# Patient Record
Sex: FEMALE | Race: WHITE | NOT HISPANIC OR LATINO | Employment: FULL TIME | URBAN - METROPOLITAN AREA
[De-identification: names, ages, dates, MRNs, and addresses within clinical notes are randomized per-mention and may not be internally consistent; named-entity substitution may affect disease eponyms.]

---

## 2017-01-16 RX ORDER — AMLODIPINE BESYLATE 10 MG/1
TABLET ORAL
Qty: 90 TABLET | Refills: 0 | Status: SHIPPED | OUTPATIENT
Start: 2017-01-16 | End: 2017-04-21

## 2017-01-16 RX ORDER — METOPROLOL SUCCINATE 50 MG/1
TABLET, EXTENDED RELEASE ORAL
Qty: 180 TABLET | Refills: 0 | Status: SHIPPED | OUTPATIENT
Start: 2017-01-16 | End: 2017-07-29 | Stop reason: SDUPTHER

## 2017-01-19 DIAGNOSIS — I10 ESSENTIAL HYPERTENSION: ICD-10-CM

## 2017-01-19 RX ORDER — LOSARTAN POTASSIUM AND HYDROCHLOROTHIAZIDE 12.5; 1 MG/1; MG/1
1 TABLET ORAL DAILY
Qty: 90 TABLET | Refills: 1 | Status: SHIPPED | OUTPATIENT
Start: 2017-01-19 | End: 2017-08-02 | Stop reason: SDUPTHER

## 2017-02-13 ENCOUNTER — TELEPHONE (OUTPATIENT)
Dept: FAMILY MEDICINE CLINIC | Facility: CLINIC | Age: 47
End: 2017-02-13

## 2017-02-13 RX ORDER — FENOFIBRIC ACID 135 MG/1
135 CAPSULE, DELAYED RELEASE ORAL DAILY
Qty: 30 CAPSULE | Refills: 6 | Status: SHIPPED | OUTPATIENT
Start: 2017-02-13 | End: 2017-03-23 | Stop reason: SDUPTHER

## 2017-02-13 RX ORDER — ROSUVASTATIN CALCIUM 20 MG/1
20 TABLET, COATED ORAL DAILY
Qty: 30 TABLET | Refills: 6 | Status: SHIPPED | OUTPATIENT
Start: 2017-02-13 | End: 2017-10-31 | Stop reason: SDUPTHER

## 2017-03-23 RX ORDER — FENOFIBRIC ACID 135 MG/1
135 CAPSULE, DELAYED RELEASE ORAL DAILY
Qty: 30 CAPSULE | Refills: 0 | Status: SHIPPED | OUTPATIENT
Start: 2017-03-23 | End: 2017-12-04

## 2017-04-21 ENCOUNTER — OFFICE VISIT (OUTPATIENT)
Dept: FAMILY MEDICINE CLINIC | Facility: CLINIC | Age: 47
End: 2017-04-21

## 2017-04-21 VITALS
OXYGEN SATURATION: 95 % | DIASTOLIC BLOOD PRESSURE: 68 MMHG | SYSTOLIC BLOOD PRESSURE: 102 MMHG | BODY MASS INDEX: 26.78 KG/M2 | WEIGHT: 156 LBS | HEART RATE: 67 BPM | TEMPERATURE: 98.3 F

## 2017-04-21 DIAGNOSIS — Z72.0 TOBACCO ABUSE: ICD-10-CM

## 2017-04-21 DIAGNOSIS — E78.2 MIXED HYPERLIPIDEMIA: ICD-10-CM

## 2017-04-21 DIAGNOSIS — I10 ESSENTIAL HYPERTENSION: Primary | ICD-10-CM

## 2017-04-21 LAB
ALBUMIN SERPL-MCNC: 4.6 G/DL (ref 3.5–5.2)
ALBUMIN/GLOB SERPL: 2.4 G/DL
ALP SERPL-CCNC: 39 U/L (ref 39–117)
ALT SERPL-CCNC: 25 U/L (ref 1–33)
AST SERPL-CCNC: 18 U/L (ref 1–32)
BILIRUB SERPL-MCNC: 0.3 MG/DL (ref 0.1–1.2)
BUN SERPL-MCNC: 9 MG/DL (ref 6–20)
BUN/CREAT SERPL: 8.7 (ref 7–25)
CALCIUM SERPL-MCNC: 9.8 MG/DL (ref 8.6–10.5)
CHLORIDE SERPL-SCNC: 103 MMOL/L (ref 98–107)
CHOLEST SERPL-MCNC: 66 MG/DL (ref 0–200)
CO2 SERPL-SCNC: 26.8 MMOL/L (ref 22–29)
CREAT SERPL-MCNC: 1.04 MG/DL (ref 0.57–1)
GLOBULIN SER CALC-MCNC: 1.9 GM/DL
GLUCOSE SERPL-MCNC: 94 MG/DL (ref 65–99)
HDLC SERPL-MCNC: 31 MG/DL (ref 40–60)
LDLC SERPL CALC-MCNC: 18 MG/DL (ref 0–100)
POTASSIUM SERPL-SCNC: 4.1 MMOL/L (ref 3.5–5.2)
PROT SERPL-MCNC: 6.5 G/DL (ref 6–8.5)
SODIUM SERPL-SCNC: 143 MMOL/L (ref 136–145)
TRIGL SERPL-MCNC: 86 MG/DL (ref 0–150)
VLDLC SERPL CALC-MCNC: 17.2 MG/DL (ref 5–40)

## 2017-04-21 PROCEDURE — 99214 OFFICE O/P EST MOD 30 MIN: CPT | Performed by: FAMILY MEDICINE

## 2017-04-21 RX ORDER — VARENICLINE TARTRATE 1 MG/1
1 TABLET, FILM COATED ORAL 2 TIMES DAILY
Qty: 60 TABLET | Refills: 3 | Status: SHIPPED | OUTPATIENT
Start: 2017-04-21 | End: 2017-12-04

## 2017-04-21 RX ORDER — ASCORBIC ACID 125 MG
300 TABLET,CHEWABLE ORAL DAILY
COMMUNITY
End: 2019-03-19

## 2017-04-21 RX ORDER — TESTOSTERONE 10 MG/.5G
GEL, METERED TOPICAL
COMMUNITY
End: 2017-12-04

## 2017-04-21 RX ORDER — ASCORBIC ACID 500 MG
500 TABLET ORAL DAILY
COMMUNITY
End: 2019-03-19

## 2017-04-21 RX ORDER — PARSLEY 450 MG
CAPSULE ORAL
COMMUNITY
End: 2019-03-19

## 2017-04-21 RX ORDER — PROGESTERONE 50 MG/ML
INJECTION, SOLUTION INTRAMUSCULAR DAILY
COMMUNITY
End: 2018-03-06

## 2017-04-21 NOTE — PROGRESS NOTES
Subjective   Myriam Thomas is a 46 y.o. female. Presents today for   Chief Complaint   Patient presents with   • Follow-up     Has gotten hormonal replacement.  med check    • Hyperlipidemia   • Hypertension       Hypertension   This is a chronic problem. The current episode started more than 1 year ago. The problem is unchanged. The problem is controlled. Pertinent negatives include no chest pain, headaches, orthopnea, palpitations, peripheral edema, PND or shortness of breath. There are no associated agents to hypertension. Risk factors for coronary artery disease include dyslipidemia, post-menopausal state and smoking/tobacco exposure. Past treatments include beta blockers, angiotensin blockers and diuretics. The current treatment provides significant improvement. There are no compliance problems.  There is no history of kidney disease, CAD/MI or CVA. There is no history of chronic renal disease.   Hyperlipidemia   This is a chronic problem. The current episode started more than 1 year ago. Condition status: Due for recheck. Lipid results: Due for recheck. She has no history of chronic renal disease, diabetes, hypothyroidism or liver disease. Factors aggravating her hyperlipidemia include beta blockers and thiazides. Pertinent negatives include no chest pain, focal sensory loss, focal weakness, leg pain, myalgias or shortness of breath. Current antihyperlipidemic treatment includes statins and fibric acid derivatives. The current treatment provides significant improvement of lipids. There are no compliance problems.  Risk factors for coronary artery disease include dyslipidemia, hypertension and post-menopausal.   Nicotine Dependence   Presents for initial visit. Symptoms include cravings. Preferred tobacco types include cigarettes. Her urge triggers include company of smokers. Past treatments include nothing. Myriam is ready to quit. Myriam has tried to quit 0 times.       Review of Systems   Constitutional: Unexpected  weight change: Weight gain or loss.   Respiratory: Negative for shortness of breath and wheezing.    Cardiovascular: Negative for chest pain, palpitations, orthopnea, leg swelling and PND.   Gastrointestinal: Negative for abdominal pain, nausea and vomiting.   Musculoskeletal: Negative for myalgias.   Neurological: Negative for dizziness, tremors, focal weakness, syncope, facial asymmetry, speech difficulty, weakness, light-headedness, numbness and headaches.   Psychiatric/Behavioral: The patient is nervous/anxious.        PHQ-9 Depression Screening 4/21/2017   Little interest or pleasure in doing things 0   Feeling down, depressed, or hopeless 1   Trouble falling or staying asleep, or sleeping too much 1   Feeling tired or having little energy 0   Poor appetite or overeating 1   Feeling bad about yourself - or that you are a failure or have let yourself or your family down 0   Trouble concentrating on things, such as reading the newspaper or watching television 0   Moving or speaking so slowly that other people could have noticed. Or the opposite - being so fidgety or restless that you have been moving around a lot more than usual 0   Thoughts that you would be better off dead, or of hurting yourself in some way 1   PHQ-9 Total Score 4   Reports  from  in Feb.  Doing ok and dealing with this.    The following portions of the patient's history were reviewed and updated as appropriate: allergies, current medications, past medical history, past social history and problem list.    Patient Active Problem List   Diagnosis   • Mitral valve insufficiency   • Palpitations   • Essential hypertension   • Tobacco abuse   • Anxiety   • Mixed hyperlipidemia       Allergies   Allergen Reactions   • Hydrocodone-Acetaminophen        Current Outpatient Prescriptions on File Prior to Visit   Medication Sig Dispense Refill   • aspirin 81 MG tablet Take  by mouth daily.     • escitalopram (LEXAPRO) 20 MG tablet Take 1  tablet by mouth Daily. 90 tablet 3   • fenofibric acid (TRILIPIX) 135 MG capsule delayed-release delayed release capsule Take 1 capsule by mouth Daily. 30 capsule 0   • folic acid (FOLVITE) 400 MCG tablet Take 400 mcg by mouth daily.     • losartan-hydrochlorothiazide (HYZAAR) 100-12.5 MG per tablet Take 1 tablet by mouth Daily. 90 tablet 1   • metoprolol succinate XL (TOPROL-XL) 50 MG 24 hr tablet TAKE 1 TABLET TWICE DAILY 180 tablet 0   • rosuvastatin (CRESTOR) 20 MG tablet Take 1 tablet by mouth Daily. 30 tablet 6   • [DISCONTINUED] amLODIPine (NORVASC) 10 MG tablet TAKE 1 TABLET BY MOUTH EVERY DAY 90 tablet 0     No current facility-administered medications on file prior to visit.        Objective   Vitals:    04/21/17 0806   BP: 102/68   Pulse: 67   Temp: 98.3 °F (36.8 °C)   SpO2: 95%   Weight: 156 lb (70.8 kg)       Physical Exam   Constitutional: She appears well-developed and well-nourished.   HENT:   Head: Normocephalic and atraumatic.   Neck: Neck supple. No JVD present. No thyromegaly present.   Cardiovascular: Normal rate, regular rhythm and normal heart sounds.  Exam reveals no gallop and no friction rub.    No murmur heard.  Pulmonary/Chest: Effort normal and breath sounds normal. No respiratory distress. She has no wheezes. She has no rales.   Abdominal: Soft. Bowel sounds are normal. She exhibits no distension. There is no tenderness. There is no rebound and no guarding.   Musculoskeletal: She exhibits no edema.   Neurological: She is alert.   Skin: Skin is warm and dry.   Psychiatric: She has a normal mood and affect. Her behavior is normal.   Nursing note and vitals reviewed.      Assessment/Plan   Myriam was seen today for follow-up, hyperlipidemia and hypertension.    Diagnoses and all orders for this visit:    Essential hypertension  -     Comprehensive Metabolic Panel    Mixed hyperlipidemia  -     Lipid Panel  -     Comprehensive Metabolic Panel    Tobacco abuse  -     varenicline (CHANTIX  STARTING MONTH [place]) 0.5 MG X 11 & 1 MG X 42 tablet; Take 0.5 mg one daily on days 1-2 and 0.5 mg twice daily on days 4-7. Then 1 mg twice daily for a total of 12 weeks.  -     varenicline (CHANTIX CONTINUING MONTH [place]) 1 MG tablet; Take 1 tablet by mouth 2 (Two) Times a Day.    -hypertension - controlled, continue medications  -HLD - continue statin, recheck lipids.  -I advised the patient of the risks in continuing to use tobacco, and I provided this patient with smoking cessation Rx.  I also discussed how to quit smoking and the patient has expressed the willingness to quit.    -During this visit, I spent 3-10 mintues counseling the patient regarding smoking cessation.            -Follow up: 6 months       Current Outpatient Prescriptions:   •  Ashwagandha 500 MG capsule, Take  by mouth., Disp: , Rfl:   •  aspirin 81 MG tablet, Take  by mouth daily., Disp: , Rfl:   •  Cyanocobalamin (B-12) 5000 MCG capsule, Take  by mouth., Disp: , Rfl:   •  escitalopram (LEXAPRO) 20 MG tablet, Take 1 tablet by mouth Daily., Disp: 90 tablet, Rfl: 3  •  fenofibric acid (TRILIPIX) 135 MG capsule delayed-release delayed release capsule, Take 1 capsule by mouth Daily., Disp: 30 capsule, Rfl: 0  •  folic acid (FOLVITE) 400 MCG tablet, Take 400 mcg by mouth daily., Disp: , Rfl:   •  losartan-hydrochlorothiazide (HYZAAR) 100-12.5 MG per tablet, Take 1 tablet by mouth Daily., Disp: 90 tablet, Rfl: 1  •  metoprolol succinate XL (TOPROL-XL) 50 MG 24 hr tablet, TAKE 1 TABLET TWICE DAILY, Disp: 180 tablet, Rfl: 0  •  progesterone oil 50 MG/ML injection, Inject  into the shoulder, thigh, or buttocks Daily., Disp: , Rfl:   •  rosuvastatin (CRESTOR) 20 MG tablet, Take 1 tablet by mouth Daily., Disp: 30 tablet, Rfl: 6  •  Testosterone 10 MG/ACT (2%) gel, Place  on the skin., Disp: , Rfl:   •  vitamin C (ASCORBIC ACID) 500 MG tablet, Take 500 mg by mouth Daily., Disp: , Rfl:

## 2017-04-23 NOTE — PROGRESS NOTES
Call and mail copy of results to patient.  Cholesterol is very low, I would stop the fenofibric acid and just continue the rosuvastatin.  Rest of labs look okay.

## 2017-04-25 ENCOUNTER — TELEPHONE (OUTPATIENT)
Dept: FAMILY MEDICINE CLINIC | Facility: CLINIC | Age: 47
End: 2017-04-25

## 2017-06-19 ENCOUNTER — TELEPHONE (OUTPATIENT)
Dept: FAMILY MEDICINE CLINIC | Facility: CLINIC | Age: 47
End: 2017-06-19

## 2017-06-20 RX ORDER — BUPROPION HYDROCHLORIDE 150 MG/1
TABLET, EXTENDED RELEASE ORAL
Qty: 60 TABLET | Refills: 3 | Status: SHIPPED | OUTPATIENT
Start: 2017-06-20 | End: 2017-12-04

## 2017-07-31 RX ORDER — METOPROLOL SUCCINATE 50 MG/1
TABLET, EXTENDED RELEASE ORAL
Qty: 180 TABLET | Refills: 1 | Status: SHIPPED | OUTPATIENT
Start: 2017-07-31 | End: 2018-01-10 | Stop reason: SDUPTHER

## 2017-08-02 DIAGNOSIS — I10 ESSENTIAL HYPERTENSION: ICD-10-CM

## 2017-08-03 RX ORDER — LOSARTAN POTASSIUM AND HYDROCHLOROTHIAZIDE 12.5; 1 MG/1; MG/1
TABLET ORAL
Qty: 90 TABLET | Refills: 1 | Status: SHIPPED | OUTPATIENT
Start: 2017-08-03 | End: 2018-03-02 | Stop reason: SDUPTHER

## 2017-10-31 RX ORDER — ROSUVASTATIN CALCIUM 20 MG/1
20 TABLET, COATED ORAL DAILY
Qty: 90 TABLET | Refills: 0 | Status: SHIPPED | OUTPATIENT
Start: 2017-10-31 | End: 2017-12-04 | Stop reason: SDUPTHER

## 2017-12-04 ENCOUNTER — OFFICE VISIT (OUTPATIENT)
Dept: FAMILY MEDICINE CLINIC | Facility: CLINIC | Age: 47
End: 2017-12-04

## 2017-12-04 VITALS
OXYGEN SATURATION: 97 % | BODY MASS INDEX: 24.37 KG/M2 | SYSTOLIC BLOOD PRESSURE: 96 MMHG | DIASTOLIC BLOOD PRESSURE: 60 MMHG | TEMPERATURE: 97.8 F | WEIGHT: 142 LBS | HEART RATE: 75 BPM

## 2017-12-04 DIAGNOSIS — I10 ESSENTIAL HYPERTENSION: Primary | ICD-10-CM

## 2017-12-04 DIAGNOSIS — F33.2 SEVERE EPISODE OF RECURRENT MAJOR DEPRESSIVE DISORDER, WITHOUT PSYCHOTIC FEATURES (HCC): ICD-10-CM

## 2017-12-04 DIAGNOSIS — E78.2 MIXED HYPERLIPIDEMIA: ICD-10-CM

## 2017-12-04 DIAGNOSIS — F41.9 ANXIETY: ICD-10-CM

## 2017-12-04 DIAGNOSIS — Z72.0 TOBACCO ABUSE: ICD-10-CM

## 2017-12-04 PROCEDURE — 99214 OFFICE O/P EST MOD 30 MIN: CPT | Performed by: FAMILY MEDICINE

## 2017-12-04 RX ORDER — ROSUVASTATIN CALCIUM 20 MG/1
20 TABLET, COATED ORAL DAILY
Qty: 90 TABLET | Refills: 1 | Status: SHIPPED | OUTPATIENT
Start: 2017-12-04 | End: 2018-08-14 | Stop reason: SDUPTHER

## 2017-12-04 RX ORDER — MIRTAZAPINE 30 MG/1
TABLET, FILM COATED ORAL
Qty: 30 TABLET | Refills: 5 | Status: SHIPPED | OUTPATIENT
Start: 2017-12-04 | End: 2018-01-04 | Stop reason: SDUPTHER

## 2017-12-04 NOTE — PROGRESS NOTES
Subjective   Myriam Thmoas is a 47 y.o. female. Presents today for   Chief Complaint   Patient presents with   • Follow-up     depression and meds some discontinued.     • Hypertension   • Hyperlipidemia       Hypertension   This is a chronic problem. The current episode started more than 1 year ago. The problem is unchanged. The problem is controlled. Pertinent negatives include no blurred vision, orthopnea, peripheral edema or shortness of breath. There are no associated agents to hypertension. Risk factors for coronary artery disease include dyslipidemia and smoking/tobacco exposure. Past treatments include ACE inhibitors, diuretics and angiotensin blockers. The current treatment provides moderate improvement. There are no compliance problems.  There is no history of kidney disease, CAD/MI or CVA.   Hyperlipidemia   This is a chronic problem. The current episode started more than 1 year ago. The problem is controlled. Recent lipid tests were reviewed and are normal. Factors aggravating her hyperlipidemia include thiazides. Pertinent negatives include no focal sensory loss, focal weakness, myalgias or shortness of breath. Current antihyperlipidemic treatment includes statins. The current treatment provides moderate improvement of lipids. There are no compliance problems.  Risk factors for coronary artery disease include dyslipidemia and hypertension.   Depression   Visit Type: initial (Patient a month ago tried to commit suicide, was in [facility], off/on separation from .  Has 17 y/o and trying to better for her.  Is not seeing psychiatrist but seeing therapist.  Tried wellburin for smoking cessation but intol.)  Onset of symptoms: more than 6 months ago  Progression since onset: gradually improving  Patient presents with the following symptoms: anhedonia, depressed mood, feelings of worthlessness, insomnia, muscle tension, nervousness/anxiety and weight loss.  Patient is not experiencing:  irritability, shortness of breath, suicidal ideas, suicidal planning and thoughts of death.  Frequency of symptoms: most days   Severity: severe   Aggravated by: family issues  Sleep quality: fair  Nighttime awakenings: several  Risk factors: marital problems and prior hospitalization  Patient has a history of: depression and suicide attempt  Treatment tried: SSRI  Compliance with treatment: good  Improvement on treatment: no relief      Works for [employer], having job eliminated in January adding to stress.    Review of Systems   Constitutional: Positive for weight loss. Negative for irritability.   Eyes: Negative for blurred vision.   Respiratory: Negative for shortness of breath.    Cardiovascular: Negative for orthopnea.   Musculoskeletal: Negative for myalgias.   Neurological: Negative for focal weakness.   Psychiatric/Behavioral: Negative for suicidal ideas. The patient is nervous/anxious and has insomnia.        The following portions of the patient's history were reviewed and updated as appropriate: allergies, current medications, past medical history and problem list.    Patient Active Problem List   Diagnosis   • Mitral valve insufficiency   • Palpitations   • Essential hypertension   • Tobacco abuse   • Anxiety   • Mixed hyperlipidemia       Allergies   Allergen Reactions   • Hydrocodone-Acetaminophen        Current Outpatient Prescriptions on File Prior to Visit   Medication Sig Dispense Refill   • Ashwagandha 500 MG capsule Take  by mouth.     • aspirin 81 MG tablet Take  by mouth daily.     • Cyanocobalamin (B-12) 5000 MCG capsule Take  by mouth.     • escitalopram (LEXAPRO) 20 MG tablet Take 1 tablet by mouth Daily. 90 tablet 3   • folic acid (FOLVITE) 400 MCG tablet Take 400 mcg by mouth daily.     • losartan-hydrochlorothiazide (HYZAAR) 100-12.5 MG per tablet TAKE 1 TABLET BY MOUTH DAILY. 90 tablet 1   • progesterone oil 50 MG/ML injection Inject  into the shoulder, thigh, or buttocks Daily.     •  vitamin C (ASCORBIC ACID) 500 MG tablet Take 500 mg by mouth Daily.     • [DISCONTINUED] rosuvastatin (CRESTOR) 20 MG tablet Take 1 tablet by mouth Daily. 90 tablet 0   • metoprolol succinate XL (TOPROL-XL) 50 MG 24 hr tablet TAKE 1 TABLET TWICE DAILY 180 tablet 1   • [DISCONTINUED] buPROPion SR (WELLBUTRIN SR) 150 MG 12 hr tablet Take 1 tablet by mouth for 3 days then twice a day. 60 tablet 3   • [DISCONTINUED] fenofibric acid (TRILIPIX) 135 MG capsule delayed-release delayed release capsule Take 1 capsule by mouth Daily. 30 capsule 0   • [DISCONTINUED] Testosterone 10 MG/ACT (2%) gel Place  on the skin.     • [DISCONTINUED] varenicline (CHANTIX CONTINUING MONTH [place]) 1 MG tablet Take 1 tablet by mouth 2 (Two) Times a Day. 60 tablet 3   • [DISCONTINUED] varenicline (CHANTIX STARTING MONTH PAK) 0.5 MG X 11 & 1 MG X 42 tablet Take 0.5 mg one daily on days 1-2 and 0.5 mg twice daily on days 4-7. Then 1 mg twice daily for a total of 12 weeks. 53 tablet 0     No current facility-administered medications on file prior to visit.        Objective   Vitals:    12/04/17 0955   BP: 96/60   Pulse: 75   Temp: 97.8 °F (36.6 °C)   SpO2: 97%   Weight: 142 lb (64.4 kg)       Physical Exam   Constitutional: She appears well-developed and well-nourished.   HENT:   Head: Normocephalic and atraumatic.   Neck: Neck supple. No JVD present. No thyromegaly present.   Cardiovascular: Normal rate, regular rhythm and normal heart sounds.  Exam reveals no gallop and no friction rub.    No murmur heard.  Pulmonary/Chest: Effort normal and breath sounds normal. No respiratory distress. She has no wheezes. She has no rales.   Abdominal: Soft. Bowel sounds are normal. She exhibits no distension. There is no tenderness. There is no rebound and no guarding.   Musculoskeletal: She exhibits no edema.   Neurological: She is alert.   Skin: Skin is warm and dry.   Psychiatric: Her behavior is normal. Her mood appears anxious.   Nursing note and vitals  reviewed.      Assessment/Plan   Myriam was seen today for follow-up, hypertension and hyperlipidemia.    Diagnoses and all orders for this visit:    Essential hypertension  -     Comprehensive Metabolic Panel  -     Lipid Panel    Mixed hyperlipidemia  -     Comprehensive Metabolic Panel  -     Lipid Panel    Anxiety    Tobacco abuse    Severe episode of recurrent major depressive disorder, without psychotic features  -     mirtazapine (REMERON) 30 MG tablet; 1/2 po nightly x 7 days then 1 po daily    Other orders  -     rosuvastatin (CRESTOR) 20 MG tablet; Take 1 tablet by mouth Daily.    -hypertension - controlled, continue medications  -HLD - continue statin, recheck lipids.  -tobacco use - intolerant wellbutrin and chantix, will hold off dealing with at patient's wishes as too stressed  -depression - high risk, seek care immediately if thoughts of self harm.         -Follow up: 4 weeks       Current Outpatient Prescriptions:   •  Ashwagandha 500 MG capsule, Take  by mouth., Disp: , Rfl:   •  aspirin 81 MG tablet, Take  by mouth daily., Disp: , Rfl:   •  Cyanocobalamin (B-12) 5000 MCG capsule, Take  by mouth., Disp: , Rfl:   •  escitalopram (LEXAPRO) 20 MG tablet, Take 1 tablet by mouth Daily., Disp: 90 tablet, Rfl: 3  •  folic acid (FOLVITE) 400 MCG tablet, Take 400 mcg by mouth daily., Disp: , Rfl:   •  losartan-hydrochlorothiazide (HYZAAR) 100-12.5 MG per tablet, TAKE 1 TABLET BY MOUTH DAILY., Disp: 90 tablet, Rfl: 1  •  progesterone oil 50 MG/ML injection, Inject  into the shoulder, thigh, or buttocks Daily., Disp: , Rfl:   •  rosuvastatin (CRESTOR) 20 MG tablet, Take 1 tablet by mouth Daily., Disp: 90 tablet, Rfl: 1  •  vitamin C (ASCORBIC ACID) 500 MG tablet, Take 500 mg by mouth Daily., Disp: , Rfl:   •  metoprolol succinate XL (TOPROL-XL) 50 MG 24 hr tablet, TAKE 1 TABLET TWICE DAILY, Disp: 180 tablet, Rfl: 1  •  mirtazapine (REMERON) 30 MG tablet, 1/2 po nightly x 7 days then 1 po daily, Disp: 30 tablet,  Rfl: 5

## 2017-12-05 LAB
ALBUMIN SERPL-MCNC: 4.6 G/DL (ref 3.5–5.2)
ALBUMIN/GLOB SERPL: 2.1 G/DL
ALP SERPL-CCNC: 53 U/L (ref 39–117)
ALT SERPL-CCNC: 10 U/L (ref 1–33)
AST SERPL-CCNC: 12 U/L (ref 1–32)
BILIRUB SERPL-MCNC: 0.3 MG/DL (ref 0.1–1.2)
BUN SERPL-MCNC: 9 MG/DL (ref 6–20)
BUN/CREAT SERPL: 12.7 (ref 7–25)
CALCIUM SERPL-MCNC: 9.4 MG/DL (ref 8.6–10.5)
CHLORIDE SERPL-SCNC: 102 MMOL/L (ref 98–107)
CHOLEST SERPL-MCNC: 63 MG/DL (ref 0–200)
CO2 SERPL-SCNC: 26.7 MMOL/L (ref 22–29)
CREAT SERPL-MCNC: 0.71 MG/DL (ref 0.57–1)
GFR SERPLBLD CREATININE-BSD FMLA CKD-EPI: 108 ML/MIN/1.73
GFR SERPLBLD CREATININE-BSD FMLA CKD-EPI: 89 ML/MIN/1.73
GLOBULIN SER CALC-MCNC: 2.2 GM/DL
GLUCOSE SERPL-MCNC: 85 MG/DL (ref 65–99)
HDLC SERPL-MCNC: 36 MG/DL (ref 40–60)
LDLC SERPL CALC-MCNC: 15 MG/DL (ref 0–100)
POTASSIUM SERPL-SCNC: 4.2 MMOL/L (ref 3.5–5.2)
PROT SERPL-MCNC: 6.8 G/DL (ref 6–8.5)
SODIUM SERPL-SCNC: 141 MMOL/L (ref 136–145)
TRIGL SERPL-MCNC: 60 MG/DL (ref 0–150)
VLDLC SERPL CALC-MCNC: 12 MG/DL (ref 5–40)

## 2017-12-06 NOTE — PROGRESS NOTES
Call and mail copy of results to patient.  Cholesterol is good  Kidney, liver function and blood sugar normal

## 2018-01-04 ENCOUNTER — OFFICE VISIT (OUTPATIENT)
Dept: FAMILY MEDICINE CLINIC | Facility: CLINIC | Age: 48
End: 2018-01-04

## 2018-01-04 VITALS
SYSTOLIC BLOOD PRESSURE: 120 MMHG | TEMPERATURE: 98.3 F | BODY MASS INDEX: 24.89 KG/M2 | HEART RATE: 70 BPM | DIASTOLIC BLOOD PRESSURE: 72 MMHG | WEIGHT: 145 LBS | OXYGEN SATURATION: 98 %

## 2018-01-04 DIAGNOSIS — F32.1 MODERATE SINGLE CURRENT EPISODE OF MAJOR DEPRESSIVE DISORDER (HCC): Primary | ICD-10-CM

## 2018-01-04 PROCEDURE — 99214 OFFICE O/P EST MOD 30 MIN: CPT | Performed by: FAMILY MEDICINE

## 2018-01-04 RX ORDER — ESCITALOPRAM OXALATE 10 MG/1
TABLET ORAL
Qty: 21 TABLET | Refills: 0 | Status: SHIPPED | OUTPATIENT
Start: 2018-01-04 | End: 2018-03-06

## 2018-01-04 RX ORDER — MIRTAZAPINE 30 MG/1
30 TABLET, FILM COATED ORAL NIGHTLY
Qty: 30 TABLET | Refills: 5 | Status: SHIPPED | OUTPATIENT
Start: 2018-01-04 | End: 2018-01-04 | Stop reason: SDUPTHER

## 2018-01-04 RX ORDER — MIRTAZAPINE 30 MG/1
30 TABLET, FILM COATED ORAL NIGHTLY
Qty: 30 TABLET | Refills: 5 | Status: SHIPPED | OUTPATIENT
Start: 2018-01-04 | End: 2018-01-26 | Stop reason: SDUPTHER

## 2018-01-04 NOTE — PROGRESS NOTES
Subjective   Myriam Thomas is a 47 y.o. female. Presents today for   Chief Complaint   Patient presents with   • Follow-up     remeron not helping her insomnia.  Causing her to feel hyper.       Depression   Visit Type: follow-up  Patient presents with the following symptoms: anhedonia, depressed mood, feelings of hopelessness, insomnia and nervousness/anxiety.  Patient is not experiencing: excessive worry, suicidal ideas, suicidal planning and thoughts of death.  Frequency of symptoms: most days   Severity: moderate   Sleep quality: poor  Nighttime awakenings: has hard time falling asleep, will lay there for hours.  Compliance with medications:  %        Insomnia   This is a chronic problem. The current episode started more than 1 month ago. The problem occurs constantly. The problem has been unchanged. Pertinent negatives include no abdominal pain, chest pain, nausea or vomiting. Exacerbated by: anxiety and depression. Treatments tried: remeron;   The treatment provided no relief.     Was on wellbutrin for smoking cessation, but didn't tolerate;  In past has tried prozac, tried trazodone for sleep with no relief.  Currently on lexapro for 4 years;  Was placed on remeron for sleep a few weeks ago.      Review of Systems   Cardiovascular: Negative for chest pain.   Gastrointestinal: Negative for abdominal pain, nausea and vomiting.   Psychiatric/Behavioral: Negative for suicidal ideas. The patient is nervous/anxious and has insomnia.        The following portions of the patient's history were reviewed and updated as appropriate: allergies, current medications, past medical history and problem list.    Patient Active Problem List   Diagnosis   • Mitral valve insufficiency   • Palpitations   • Essential hypertension   • Tobacco abuse   • Anxiety   • Mixed hyperlipidemia       Allergies   Allergen Reactions   • Hydrocodone-Acetaminophen        Current Outpatient Prescriptions on File Prior to Visit   Medication  Sig Dispense Refill   • Ashwagandha 500 MG capsule Take  by mouth.     • aspirin 81 MG tablet Take  by mouth daily.     • Cyanocobalamin (B-12) 5000 MCG capsule Take  by mouth.     • folic acid (FOLVITE) 400 MCG tablet Take 400 mcg by mouth daily.     • losartan-hydrochlorothiazide (HYZAAR) 100-12.5 MG per tablet TAKE 1 TABLET BY MOUTH DAILY. 90 tablet 1   • metoprolol succinate XL (TOPROL-XL) 50 MG 24 hr tablet TAKE 1 TABLET TWICE DAILY 180 tablet 1   • progesterone oil 50 MG/ML injection Inject  into the shoulder, thigh, or buttocks Daily.     • rosuvastatin (CRESTOR) 20 MG tablet Take 1 tablet by mouth Daily. 90 tablet 1   • vitamin C (ASCORBIC ACID) 500 MG tablet Take 500 mg by mouth Daily.     • [DISCONTINUED] escitalopram (LEXAPRO) 20 MG tablet Take 1 tablet by mouth Daily. 90 tablet 3   • [DISCONTINUED] mirtazapine (REMERON) 30 MG tablet 1/2 po nightly x 7 days then 1 po daily 30 tablet 5     No current facility-administered medications on file prior to visit.        Objective   Vitals:    01/04/18 0824   BP: 120/72   Pulse: 70   Temp: 98.3 °F (36.8 °C)   SpO2: 98%   Weight: 65.8 kg (145 lb)       Physical Exam   Constitutional: She appears well-developed and well-nourished.   HENT:   Head: Normocephalic and atraumatic.   Neck: Neck supple. No JVD present. No thyromegaly present.   Cardiovascular: Normal rate, regular rhythm and normal heart sounds.  Exam reveals no gallop and no friction rub.    No murmur heard.  Pulmonary/Chest: Effort normal and breath sounds normal. No respiratory distress. She has no wheezes. She has no rales.   Abdominal: Soft. Bowel sounds are normal. She exhibits no distension. There is no tenderness. There is no rebound and no guarding.   Musculoskeletal: She exhibits no edema.   Neurological: She is alert.   Skin: Skin is warm and dry.   Psychiatric: Her speech is normal and behavior is normal. Cognition and memory are normal. She exhibits a depressed mood.   PHQ-9 completed and  score 10, moderate category.   Nursing note and vitals reviewed.      Assessment/Plan   Myriam was seen today for follow-up.    Diagnoses and all orders for this visit:    Moderate single current episode of major depressive disorder  -     escitalopram (LEXAPRO) 10 MG tablet; 1 po daily x 14 days, on day 15 start trintellix (samples given) and 1/2 lexapro 10mg qd for another 14 days then stop.  -     Vortioxetine HBr (TRINTELLIX) 10 MG tablet; Take 10 mg by mouth Daily.  -     mirtazapine (REMERON) 30 MG tablet; Take 1 tablet by mouth Every Night.    Other orders  -     Discontinue: mirtazapine (REMERON) 30 MG tablet; Take 1 tablet by mouth Every Night.      -will wean off lexapro, on day 15 start trintellix 5mg samples 1 po daily, gave #14 5mg and #14 10mg, gave Rx card as well.  Continue mirtazapine for now.  D/w could consider adding SNRI, but has been on lexapro for sometime and intolerant of wellbutrin already.  Has been on another SSRI, so will try trintellix.  No SI, to seek care if develops.    -offered therapy, but reports has started and recommend continue.       -Follow up: 6 weeks       Current Outpatient Prescriptions:   •  Ashwagandha 500 MG capsule, Take  by mouth., Disp: , Rfl:   •  aspirin 81 MG tablet, Take  by mouth daily., Disp: , Rfl:   •  Cyanocobalamin (B-12) 5000 MCG capsule, Take  by mouth., Disp: , Rfl:   •  folic acid (FOLVITE) 400 MCG tablet, Take 400 mcg by mouth daily., Disp: , Rfl:   •  losartan-hydrochlorothiazide (HYZAAR) 100-12.5 MG per tablet, TAKE 1 TABLET BY MOUTH DAILY., Disp: 90 tablet, Rfl: 1  •  metoprolol succinate XL (TOPROL-XL) 50 MG 24 hr tablet, TAKE 1 TABLET TWICE DAILY, Disp: 180 tablet, Rfl: 1  •  mirtazapine (REMERON) 30 MG tablet, Take 1 tablet by mouth Every Night., Disp: 30 tablet, Rfl: 5  •  progesterone oil 50 MG/ML injection, Inject  into the shoulder, thigh, or buttocks Daily., Disp: , Rfl:   •  rosuvastatin (CRESTOR) 20 MG tablet, Take 1 tablet by mouth Daily.,  Disp: 90 tablet, Rfl: 1  •  vitamin C (ASCORBIC ACID) 500 MG tablet, Take 500 mg by mouth Daily., Disp: , Rfl:   •  escitalopram (LEXAPRO) 10 MG tablet, 1 po daily x 14 days, on day 15 start trintellix (samples given) and 1/2 lexapro 10mg qd for another 14 days then stop., Disp: 21 tablet, Rfl: 0  •  Vortioxetine HBr (TRINTELLIX) 10 MG tablet, Take 10 mg by mouth Daily., Disp: 30 tablet, Rfl: 5

## 2018-01-10 DIAGNOSIS — F41.9 ANXIETY: ICD-10-CM

## 2018-01-10 RX ORDER — ESCITALOPRAM OXALATE 20 MG/1
TABLET ORAL
Qty: 90 TABLET | Refills: 3 | OUTPATIENT
Start: 2018-01-10

## 2018-01-10 RX ORDER — METOPROLOL SUCCINATE 50 MG/1
TABLET, EXTENDED RELEASE ORAL
Qty: 180 TABLET | Refills: 1 | Status: SHIPPED | OUTPATIENT
Start: 2018-01-10 | End: 2019-03-19

## 2018-01-26 DIAGNOSIS — F32.1 MODERATE SINGLE CURRENT EPISODE OF MAJOR DEPRESSIVE DISORDER (HCC): ICD-10-CM

## 2018-01-26 RX ORDER — MIRTAZAPINE 30 MG/1
30 TABLET, FILM COATED ORAL NIGHTLY
Qty: 30 TABLET | Refills: 5 | Status: SHIPPED | OUTPATIENT
Start: 2018-01-26 | End: 2018-09-27 | Stop reason: SDUPTHER

## 2018-02-21 DIAGNOSIS — F32.1 MODERATE SINGLE CURRENT EPISODE OF MAJOR DEPRESSIVE DISORDER (HCC): ICD-10-CM

## 2018-03-02 DIAGNOSIS — I10 ESSENTIAL HYPERTENSION: ICD-10-CM

## 2018-03-02 RX ORDER — LOSARTAN POTASSIUM AND HYDROCHLOROTHIAZIDE 12.5; 1 MG/1; MG/1
TABLET ORAL
Qty: 90 TABLET | Refills: 1 | Status: SHIPPED | OUTPATIENT
Start: 2018-03-02 | End: 2018-08-14 | Stop reason: SDUPTHER

## 2018-03-06 ENCOUNTER — OFFICE VISIT (OUTPATIENT)
Dept: FAMILY MEDICINE CLINIC | Facility: CLINIC | Age: 48
End: 2018-03-06

## 2018-03-06 VITALS
OXYGEN SATURATION: 95 % | HEIGHT: 64 IN | DIASTOLIC BLOOD PRESSURE: 60 MMHG | TEMPERATURE: 97.7 F | BODY MASS INDEX: 26.29 KG/M2 | SYSTOLIC BLOOD PRESSURE: 112 MMHG | WEIGHT: 154 LBS | HEART RATE: 67 BPM

## 2018-03-06 DIAGNOSIS — F32.4 MAJOR DEPRESSIVE DISORDER WITH SINGLE EPISODE, IN PARTIAL REMISSION (HCC): Primary | ICD-10-CM

## 2018-03-06 DIAGNOSIS — Z12.39 BREAST CANCER SCREENING: ICD-10-CM

## 2018-03-06 PROCEDURE — 99213 OFFICE O/P EST LOW 20 MIN: CPT | Performed by: FAMILY MEDICINE

## 2018-03-06 NOTE — PROGRESS NOTES
Subjective   Myriam Thomas is a 47 y.o. female. Presents today for   Chief Complaint   Patient presents with   • Follow-up     pt here for 6 week follow up visit.       Depression   Visit Type: follow-up  Patient presents with the following symptoms: depressed mood.  Patient is not experiencing: chest pain, feelings of worthlessness, shortness of breath, suicidal planning and thoughts of death.  Frequency of symptoms: occasionally   Severity: mild   Sleep quality: fair  Nighttime awakenings: occasional  Compliance with medications:  %  Treatment side effects: No side effects.    Due for pap and mammo    Review of Systems   Respiratory: Negative for shortness of breath.        The following portions of the patient's history were reviewed and updated as appropriate: allergies, current medications, past medical history and problem list.    Patient Active Problem List   Diagnosis   • Mitral valve insufficiency   • Palpitations   • Essential hypertension   • Tobacco abuse   • Anxiety   • Mixed hyperlipidemia       Allergies   Allergen Reactions   • Hydrocodone-Acetaminophen Itching       Current Outpatient Prescriptions on File Prior to Visit   Medication Sig Dispense Refill   • Ashwagandha 500 MG capsule Take  by mouth.     • aspirin 81 MG tablet Take  by mouth daily.     • Cyanocobalamin (B-12) 5000 MCG capsule Take  by mouth.     • folic acid (FOLVITE) 400 MCG tablet Take 400 mcg by mouth daily.     • losartan-hydrochlorothiazide (HYZAAR) 100-12.5 MG per tablet TAKE 1 TABLET BY MOUTH DAILY. 90 tablet 1   • metoprolol succinate XL (TOPROL-XL) 50 MG 24 hr tablet TAKE 1 TABLET TWICE DAILY 180 tablet 1   • mirtazapine (REMERON) 30 MG tablet Take 1 tablet by mouth Every Night. 30 tablet 5   • rosuvastatin (CRESTOR) 20 MG tablet Take 1 tablet by mouth Daily. 90 tablet 1   • vitamin C (ASCORBIC ACID) 500 MG tablet Take 500 mg by mouth Daily.     • Vortioxetine HBr (TRINTELLIX) 10 MG tablet Take 10 mg by mouth Daily. 90  "tablet 1   • [DISCONTINUED] escitalopram (LEXAPRO) 10 MG tablet 1 po daily x 14 days, on day 15 start trintellix (samples given) and 1/2 lexapro 10mg qd for another 14 days then stop. 21 tablet 0   • [DISCONTINUED] progesterone oil 50 MG/ML injection Inject  into the shoulder, thigh, or buttocks Daily.       No current facility-administered medications on file prior to visit.        Objective   Vitals:    03/06/18 0920   BP: 112/60   BP Location: Right arm   Patient Position: Sitting   Cuff Size: Adult   Pulse: 67   Temp: 97.7 °F (36.5 °C)   TempSrc: Oral   SpO2: 95%   Weight: 69.9 kg (154 lb)   Height: 162.6 cm (64\")       Physical Exam   Constitutional: She is oriented to person, place, and time. She appears well-developed and well-nourished.   Neurological: She is alert and oriented to person, place, and time.   Skin: Skin is warm and dry.   Psychiatric: She has a normal mood and affect. Her behavior is normal.   Nursing note and vitals reviewed.      Assessment/Plan   Myriam was seen today for follow-up.    Diagnoses and all orders for this visit:    Major depressive disorder with single episode, in partial remission    Breast cancer screening  -     Mammo Screening Bilateral With CAD      -reports doing much better with trintellix and completely off lexapro now  -patient due for mammo, ordered  -pap will have set-up with Halie or Awilda       -Follow up: 6 months       Current Outpatient Prescriptions:   •  Ashwagandha 500 MG capsule, Take  by mouth., Disp: , Rfl:   •  aspirin 81 MG tablet, Take  by mouth daily., Disp: , Rfl:   •  Cyanocobalamin (B-12) 5000 MCG capsule, Take  by mouth., Disp: , Rfl:   •  folic acid (FOLVITE) 400 MCG tablet, Take 400 mcg by mouth daily., Disp: , Rfl:   •  losartan-hydrochlorothiazide (HYZAAR) 100-12.5 MG per tablet, TAKE 1 TABLET BY MOUTH DAILY., Disp: 90 tablet, Rfl: 1  •  metoprolol succinate XL (TOPROL-XL) 50 MG 24 hr tablet, TAKE 1 TABLET TWICE DAILY, Disp: 180 tablet, Rfl: 1  •  " mirtazapine (REMERON) 30 MG tablet, Take 1 tablet by mouth Every Night., Disp: 30 tablet, Rfl: 5  •  rosuvastatin (CRESTOR) 20 MG tablet, Take 1 tablet by mouth Daily., Disp: 90 tablet, Rfl: 1  •  vitamin C (ASCORBIC ACID) 500 MG tablet, Take 500 mg by mouth Daily., Disp: , Rfl:   •  Vortioxetine HBr (TRINTELLIX) 10 MG tablet, Take 10 mg by mouth Daily., Disp: 90 tablet, Rfl: 1

## 2018-03-13 ENCOUNTER — PROCEDURE VISIT (OUTPATIENT)
Dept: FAMILY MEDICINE CLINIC | Facility: CLINIC | Age: 48
End: 2018-03-13

## 2018-03-13 VITALS
BODY MASS INDEX: 26.63 KG/M2 | OXYGEN SATURATION: 97 % | HEART RATE: 70 BPM | SYSTOLIC BLOOD PRESSURE: 132 MMHG | TEMPERATURE: 98.1 F | HEIGHT: 64 IN | DIASTOLIC BLOOD PRESSURE: 68 MMHG | WEIGHT: 156 LBS

## 2018-03-13 DIAGNOSIS — Z30.431 ENCOUNTER FOR MANAGEMENT OF INTRAUTERINE CONTRACEPTIVE DEVICE (IUD), UNSPECIFIED IUD MANAGEMENT TYPE: ICD-10-CM

## 2018-03-13 DIAGNOSIS — Z01.419 ENCOUNTER FOR CERVICAL PAP SMEAR WITH PELVIC EXAM: Primary | ICD-10-CM

## 2018-03-13 PROCEDURE — 99396 PREV VISIT EST AGE 40-64: CPT | Performed by: NURSE PRACTITIONER

## 2018-03-13 NOTE — PROGRESS NOTES
"Subjective   Myriam Thomas is a 47 y.o. female who presents for a well woman exam to update pap smear. Last pap 2/17 normal.     History of Present Illness   Has had regular screening, no history of abnormals. Currently in stable relationship. +FH breast cancer (MGM, and 2 aunts), no cervical, uterine or ovarian cancers in the family. No current breast issues.   The following portions of the patient's history were reviewed and updated as appropriate: allergies, current medications, past family history, past medical history, past social history, past surgical history and problem list.    Review of Systems   Genitourinary: Negative for dyspareunia, menstrual problem, vaginal bleeding, vaginal discharge, breast lump and breast pain.        Has mirena, due to have removed.   Hematological: Negative.  Negative for adenopathy.   Psychiatric/Behavioral: Negative.    /68   Pulse 70   Temp 98.1 °F (36.7 °C) (Oral)   Ht 162.6 cm (64\")   Wt 70.8 kg (156 lb)   SpO2 97%   BMI 26.78 kg/m²     Objective   Physical Exam   Constitutional: She appears well-developed and well-nourished.   Abdominal: Soft. She exhibits no distension. There is no tenderness.   Genitourinary: Vagina normal and uterus normal. Pelvic exam was performed with patient supine. There is no rash, tenderness, lesion, injury or Bartholin's cyst on the right labia. There is no rash, tenderness, lesion, injury or Bartholin's cyst on the left labia. Uterus is retroverted. Cervix exhibits friability and visible IUD strings. Cervix does not exhibit motion tenderness, discharge or lesion. Right adnexum displays no mass, no tenderness and no fullness. Right adnexum is palpable.Left adnexum displays no mass, no tenderness and no fullness. Left adnexum is palpable.  Lymphadenopathy:        Right: No inguinal adenopathy present.        Left: No inguinal adenopathy present.   Nursing note and vitals reviewed.    Assessment/Plan   Problems Addressed this Visit     " None      Visit Diagnoses     Encounter for cervical Pap smear with pelvic exam    -  Primary    Relevant Orders    Pap IG, Rfx HPV ASCU    Encounter for management of intrauterine contraceptive device (IUD), unspecified IUD management type        Relevant Orders    Ambulatory Referral to Gynecology        As overdue, will refer to gyn for removal of IUD. Consider replacement, as 47, desires contraception.

## 2018-03-14 ENCOUNTER — TELEPHONE (OUTPATIENT)
Dept: FAMILY MEDICINE CLINIC | Facility: CLINIC | Age: 48
End: 2018-03-14

## 2018-03-14 DIAGNOSIS — R92.8 ABNORMAL MAMMOGRAM: Primary | ICD-10-CM

## 2018-03-15 ENCOUNTER — TELEPHONE (OUTPATIENT)
Dept: FAMILY MEDICINE CLINIC | Facility: CLINIC | Age: 48
End: 2018-03-15

## 2018-03-15 LAB
CONV .: NORMAL
CYTOLOGIST CVX/VAG CYTO: NORMAL
CYTOLOGY CVX/VAG DOC THIN PREP: NORMAL
DX ICD CODE: NORMAL
HIV 1 & 2 AB SER-IMP: NORMAL
OTHER STN SPEC: NORMAL
PATH REPORT.FINAL DX SPEC: NORMAL
STAT OF ADQ CVX/VAG CYTO-IMP: NORMAL

## 2018-03-22 NOTE — PROGRESS NOTES
Call patient regarding imaging study.  Diagnostic mammogram was negative/normal.
Routine observation

## 2018-03-28 ENCOUNTER — TELEPHONE (OUTPATIENT)
Dept: OBSTETRICS AND GYNECOLOGY | Age: 48
End: 2018-03-28

## 2018-03-28 NOTE — TELEPHONE ENCOUNTER
Referral Que  Referring Provider- Dulce Maria Carter  Insurance- Nu  Problem- Encounter for management of intrauterine contraceptive device (IUD), unspecified IUD management type

## 2018-04-11 PROBLEM — E53.8 B12 DEFICIENCY: Status: ACTIVE | Noted: 2018-04-11

## 2018-05-24 DIAGNOSIS — F32.1 MODERATE SINGLE CURRENT EPISODE OF MAJOR DEPRESSIVE DISORDER (HCC): ICD-10-CM

## 2018-05-25 ENCOUNTER — OFFICE VISIT (OUTPATIENT)
Dept: OBSTETRICS AND GYNECOLOGY | Age: 48
End: 2018-05-25

## 2018-05-25 ENCOUNTER — TELEPHONE (OUTPATIENT)
Dept: OBSTETRICS AND GYNECOLOGY | Age: 48
End: 2018-05-25

## 2018-05-25 VITALS
HEIGHT: 64 IN | DIASTOLIC BLOOD PRESSURE: 74 MMHG | BODY MASS INDEX: 26.46 KG/M2 | WEIGHT: 155 LBS | SYSTOLIC BLOOD PRESSURE: 120 MMHG

## 2018-05-25 DIAGNOSIS — F17.200 SMOKER UNMOTIVATED TO QUIT: Primary | ICD-10-CM

## 2018-05-25 DIAGNOSIS — Z97.5 IUD (INTRAUTERINE DEVICE) IN PLACE: ICD-10-CM

## 2018-05-25 DIAGNOSIS — I10 ESSENTIAL HYPERTENSION: ICD-10-CM

## 2018-05-25 LAB
BILIRUB BLD-MCNC: NEGATIVE MG/DL
CLARITY, POC: CLEAR
COLOR UR: YELLOW
GLUCOSE UR STRIP-MCNC: NEGATIVE MG/DL
KETONES UR QL: NEGATIVE
LEUKOCYTE EST, POC: NEGATIVE
NITRITE UR-MCNC: NEGATIVE MG/ML
PH UR: 7 [PH] (ref 5–8)
PROT UR STRIP-MCNC: ABNORMAL MG/DL
RBC # UR STRIP: NEGATIVE /UL
SP GR UR: 1.02 (ref 1–1.03)
UROBILINOGEN UR QL: NORMAL

## 2018-05-25 PROCEDURE — 99213 OFFICE O/P EST LOW 20 MIN: CPT | Performed by: OBSTETRICS & GYNECOLOGY

## 2018-05-25 PROCEDURE — 81002 URINALYSIS NONAUTO W/O SCOPE: CPT | Performed by: OBSTETRICS & GYNECOLOGY

## 2018-05-25 RX ORDER — LOSARTAN POTASSIUM AND HYDROCHLOROTHIAZIDE 12.5; 1 MG/1; MG/1
1 TABLET ORAL
COMMUNITY
End: 2018-09-27 | Stop reason: SDUPTHER

## 2018-05-25 RX ORDER — METOPROLOL SUCCINATE 50 MG/1
TABLET, EXTENDED RELEASE ORAL
COMMUNITY
Start: 2018-01-10 | End: 2018-09-27 | Stop reason: SDUPTHER

## 2018-05-25 RX ORDER — ESCITALOPRAM OXALATE 10 MG/1
TABLET ORAL
Qty: 21 TABLET | Refills: 0 | OUTPATIENT
Start: 2018-05-25

## 2018-05-25 NOTE — TELEPHONE ENCOUNTER
Pt called just wanting to clarify her Primary Care physician is Lizandro Barahona located on [facility].  She mis spoke when she was in earlier today and wanted to let Dr Acevedo know.

## 2018-05-25 NOTE — PROGRESS NOTES
2018      Patient:  Myriam Thomas   MR#:[id number]    Office note    Chief Complaint   Patient presents with   • Gynecologic Exam     New pt:Discuss new iud insert,Mirena,last pap/ac 3/13/18,last mammo 3/2018       Subjective     History of Present Illness  47 y.o. female  resents feeling well without complaints.  Her recent annual exam was completed by Dr. Lizandro Barahona with a normal Pap.  The patient had a Mirena IUD placed 2013 and returns today for consult for replacement of the  device.  She's had good luck with the device without any major symptoms or side effects.  The patient has chronic medical problems of hypertension and dyslipidemia managed by her primary care doctor      Patient Active Problem List   Diagnosis   • Mitral valve insufficiency   • Palpitations   • Essential hypertension   • Anxiety   • Mixed hyperlipidemia   • B12 deficiency   • Smoker unmotivated to quit   • IUD (intrauterine device) in place       Past Medical History:   Diagnosis Date   • Anxiety    • Depression    • HTN (hypertension)    • Hyperlipidemia    • Mitral regurgitation    • Palpitations    • Renal calculi        Past Surgical History:   Procedure Laterality Date   •  SECTION     • KIDNEY STONE SURGERY     • WISDOM TOOTH EXTRACTION         Obstetric History:  OB History      Para Term  AB Living    3 3 2 1   3    SAB TAB Ectopic Molar Multiple Live Births              3         Menstrual History:     No LMP recorded. Patient has had an implant.       #: 1, Date: , Sex: Male, Weight: 2920 g (6 lb 7 oz), GA: 36w0d, Delivery: , Low Transverse, Apgar1: None, Apgar5: None, Living: Living, Birth Comments: None    #: 2, Date: , Sex: Male, Weight: 3430 g (7 lb 9 oz), GA: None, Delivery: Vaginal, Spontaneous Delivery, Apgar1: None, Apgar5: None, Living: Living, Birth Comments: None    #: 3, Date: , Sex: Female, Weight: 2977 g (6 lb 9 oz), GA: 37w0d,  Delivery: Vaginal, Spontaneous Delivery, Apgar1: None, Apgar5: None, Living: Living, Birth Comments: None      Family History   Problem Relation Age of Onset   • Hypertension Mother    • Heart attack Mother    • Diabetes Mother    • Heart disease Father         unsure full hx.   • Cancer Brother    • Breast cancer Maternal Grandmother         50s recurrence in her late 60s   • Colon cancer Maternal Grandmother         70s y/o   • Hypertension Maternal Grandmother    • Alzheimer's disease Maternal Grandmother    • Hypertension Maternal Grandfather    • Emphysema Maternal Grandfather    • Diabetes Paternal Grandmother    • No Known Problems Paternal Grandfather        Social History   Substance Use Topics   • Smoking status: Current Every Day Smoker     Packs/day: 1.00     Types: Cigarettes   • Smokeless tobacco: Never Used   • Alcohol use Yes       Hydrocodone-acetaminophen      Current Outpatient Prescriptions:   •  Ashwagandha 500 MG capsule, Take  by mouth., Disp: , Rfl:   •  aspirin 81 MG tablet, Take  by mouth daily., Disp: , Rfl:   •  Cyanocobalamin (B-12) 5000 MCG capsule, Take  by mouth., Disp: , Rfl:   •  folic acid (FOLVITE) 400 MCG tablet, Take 400 mcg by mouth daily., Disp: , Rfl:   •  losartan-hydrochlorothiazide (HYZAAR) 100-12.5 MG per tablet, TAKE 1 TABLET BY MOUTH DAILY., Disp: 90 tablet, Rfl: 1  •  losartan-hydrochlorothiazide (HYZAAR) 100-12.5 MG per tablet, Take 1 tablet by mouth., Disp: , Rfl:   •  metoprolol succinate XL (TOPROL-XL) 50 MG 24 hr tablet, TAKE 1 TABLET TWICE DAILY, Disp: 180 tablet, Rfl: 1  •  metoprolol succinate XL (TOPROL-XL) 50 MG 24 hr tablet, TAKE 1 TABLET TWICE DAILY, Disp: , Rfl:   •  mirtazapine (REMERON) 30 MG tablet, Take 1 tablet by mouth Every Night., Disp: 30 tablet, Rfl: 5  •  rosuvastatin (CRESTOR) 20 MG tablet, Take 1 tablet by mouth Daily., Disp: 90 tablet, Rfl: 1  •  vitamin C (ASCORBIC ACID) 500 MG tablet, Take 500 mg by mouth Daily., Disp: , Rfl:   •   "Vortioxetine HBr (TRINTELLIX) 10 MG tablet, Take 10 mg by mouth Daily., Disp: 90 tablet, Rfl: 1    The following portions of the patient's history were reviewed and updated as appropriate: allergies, current medications, past family history, past medical history, past social history, past surgical history and problem list.    Review of Systems   Constitutional: Negative.    Respiratory: Negative.    Cardiovascular: Negative.    Gastrointestinal: Negative.    Genitourinary: Negative.    Psychiatric/Behavioral: Negative.        BP Readings from Last 3 Encounters:   18 120/74   18 132/68   18 112/60      Wt Readings from Last 3 Encounters:   18 70.3 kg (155 lb)   18 70.8 kg (156 lb)   18 69.9 kg (154 lb)      BMI: Estimated body mass index is 26.61 kg/m² as calculated from the following:    Height as of this encounter: 162.6 cm (64\").    Weight as of this encounter: 70.3 kg (155 lb).  BSA: Estimated body surface area is 1.76 meters squared as calculated from the following:    Height as of this encounter: 162.6 cm (64\").    Weight as of this encounter: 70.3 kg (155 lb).    Objective   Physical Exam   Constitutional: She is oriented to person, place, and time. She appears well-developed and well-nourished.   HENT:   Head: Normocephalic and atraumatic.   Cardiovascular: Normal rate.    Pulmonary/Chest: Effort normal.   Abdominal: Soft. Bowel sounds are normal. She exhibits no distension. There is no tenderness.   Neurological: She is alert and oriented to person, place, and time.   Skin: Skin is warm and dry.   Psychiatric: She has a normal mood and affect. Her behavior is normal. Judgment and thought content normal.   Nursing note and vitals reviewed.        Assessment/Plan     Myriam was seen today for gynecologic exam.    Diagnoses and all orders for this visit:    Smoker unmotivated to quit    Essential hypertension    IUD (intrauterine device) in place  -  device.  Return in 2 " weeks after pre-surgeon for removal insertion of the device        Return in about 2 weeks (around 6/8/2018), or IUD insert and removal.        Petros Acevedo MD   5/25/2018 12:38 PM

## 2018-08-14 DIAGNOSIS — I10 ESSENTIAL HYPERTENSION: ICD-10-CM

## 2018-08-15 RX ORDER — LOSARTAN POTASSIUM AND HYDROCHLOROTHIAZIDE 12.5; 1 MG/1; MG/1
TABLET ORAL
Qty: 90 TABLET | Refills: 1 | Status: SHIPPED | OUTPATIENT
Start: 2018-08-15 | End: 2019-03-19

## 2018-08-15 RX ORDER — ROSUVASTATIN CALCIUM 20 MG/1
20 TABLET, COATED ORAL DAILY
Qty: 90 TABLET | Refills: 1 | Status: SHIPPED | OUTPATIENT
Start: 2018-08-15 | End: 2018-11-26 | Stop reason: SDUPTHER

## 2018-09-27 ENCOUNTER — OFFICE VISIT (OUTPATIENT)
Dept: FAMILY MEDICINE CLINIC | Facility: CLINIC | Age: 48
End: 2018-09-27

## 2018-09-27 VITALS
TEMPERATURE: 98 F | OXYGEN SATURATION: 97 % | BODY MASS INDEX: 24.41 KG/M2 | DIASTOLIC BLOOD PRESSURE: 62 MMHG | SYSTOLIC BLOOD PRESSURE: 128 MMHG | HEIGHT: 64 IN | WEIGHT: 143 LBS | HEART RATE: 69 BPM

## 2018-09-27 DIAGNOSIS — F32.1 MODERATE SINGLE CURRENT EPISODE OF MAJOR DEPRESSIVE DISORDER (HCC): Primary | ICD-10-CM

## 2018-09-27 DIAGNOSIS — G47.09 OTHER INSOMNIA: ICD-10-CM

## 2018-09-27 PROCEDURE — 99213 OFFICE O/P EST LOW 20 MIN: CPT | Performed by: FAMILY MEDICINE

## 2018-09-27 RX ORDER — TRAZODONE HYDROCHLORIDE 50 MG/1
50 TABLET ORAL NIGHTLY
Qty: 30 TABLET | Refills: 4 | Status: SHIPPED | OUTPATIENT
Start: 2018-09-27 | End: 2019-03-19

## 2018-09-27 RX ORDER — MIRTAZAPINE 15 MG/1
TABLET, FILM COATED ORAL
Qty: 11 TABLET | Refills: 0 | Status: SHIPPED | OUTPATIENT
Start: 2018-09-27 | End: 2019-03-19

## 2018-09-27 NOTE — PROGRESS NOTES
Subjective   Myriam Thomas is a 47 y.o. female. Presents today for   Chief Complaint   Patient presents with   • Depression     insomnia       Depression   Visit Type: follow-up  Patient presents with the following symptoms: anhedonia, decreased concentration, depressed mood and insomnia.  Patient is not experiencing: suicidal ideas, suicidal planning and thoughts of death.  Frequency of symptoms: most days   Severity: moderate   Sleep quality: poor  Nighttime awakenings: several  Compliance with medications:  %  Treatment side effects: Increased appetite on remeron, not helping sleep.  started therapy yesterday    Review of Systems   Psychiatric/Behavioral: Positive for decreased concentration. Negative for suicidal ideas. The patient has insomnia.        Patient Active Problem List   Diagnosis   • Mitral valve insufficiency   • Palpitations   • Essential hypertension   • Anxiety   • Mixed hyperlipidemia   • B12 deficiency   • Smoker unmotivated to quit   • IUD (intrauterine device) in place       Social History     Social History   • Marital status: Legally      Spouse name: Roddy   • Number of children: 3   • Years of education: 12     Occupational History   •  [employer]     Social History Main Topics   • Smoking status: Current Every Day Smoker     Packs/day: 1.00     Types: Cigarettes   • Smokeless tobacco: Never Used   • Alcohol use Yes   • Drug use: No   • Sexual activity: Not Currently     Partners: Male     Birth control/ protection: IUD     Other Topics Concern   • Not on file       Allergies   Allergen Reactions   • Hydrocodone-Acetaminophen Itching       Current Outpatient Prescriptions on File Prior to Visit   Medication Sig Dispense Refill   • aspirin 81 MG tablet Take 81 mg by mouth Daily.     • Cyanocobalamin (B-12) 5000 MCG capsule Take 300 mcg by mouth Daily.     • folic acid (FOLVITE) 400 MCG tablet Take 400 mcg by mouth daily.     •  "losartan-hydrochlorothiazide (HYZAAR) 100-12.5 MG per tablet TAKE 1 TABLET BY MOUTH DAILY. 90 tablet 1   • metoprolol succinate XL (TOPROL-XL) 50 MG 24 hr tablet TAKE 1 TABLET TWICE DAILY 180 tablet 1   • mirtazapine (REMERON) 30 MG tablet Take 1 tablet by mouth Every Night. 30 tablet 5   • rosuvastatin (CRESTOR) 20 MG tablet TAKE 1 TABLET BY MOUTH DAILY. 90 tablet 1   • Vortioxetine HBr (TRINTELLIX) 10 MG tablet Take 10 mg by mouth Daily. 90 tablet 1   • Ashwagandha 500 MG capsule Take  by mouth.     • vitamin C (ASCORBIC ACID) 500 MG tablet Take 500 mg by mouth Daily.     • [DISCONTINUED] losartan-hydrochlorothiazide (HYZAAR) 100-12.5 MG per tablet Take 1 tablet by mouth.     • [DISCONTINUED] metoprolol succinate XL (TOPROL-XL) 50 MG 24 hr tablet TAKE 1 TABLET TWICE DAILY       No current facility-administered medications on file prior to visit.        Objective   Vitals:    09/27/18 0818   BP: 128/62   Pulse: 69   Temp: 98 °F (36.7 °C)   TempSrc: Oral   SpO2: 97%   Weight: 64.9 kg (143 lb)   Height: 162.6 cm (64.02\")       Physical Exam   Constitutional: She is oriented to person, place, and time. She appears well-developed and well-nourished.   Neurological: She is alert and oriented to person, place, and time.   Skin: Skin is warm and dry.   Psychiatric: Her behavior is normal. She exhibits a depressed mood.   Nursing note and vitals reviewed.      Assessment/Plan   Myriam was seen today for depression.    Diagnoses and all orders for this visit:    Moderate single current episode of major depressive disorder (CMS/[facility])  -     Vortioxetine HBr (TRINTELLIX) 20 MG tablet; Take 20 mg by mouth Daily.  -     mirtazapine (REMERON) 15 MG tablet; Change to 15mg po nightly x 7 nights, then 1/2 po nightly x 7 nights then stop    Other insomnia  -     traZODone (DESYREL) 50 MG tablet; Take 1 tablet by mouth Every Night.    will wean off remeron and try trazodone;  When down to 1/2 remeron start trazodone for sleep  Will " increase trintellix to 20mg daily  Seek care immediately if thoughts or plans of self harm         -Follow up: 8 weeks and prn

## 2018-11-26 RX ORDER — ROSUVASTATIN CALCIUM 20 MG/1
20 TABLET, COATED ORAL DAILY
Qty: 90 TABLET | Refills: 0 | Status: SHIPPED | OUTPATIENT
Start: 2018-11-26 | End: 2019-03-19

## 2019-03-19 ENCOUNTER — OFFICE VISIT (OUTPATIENT)
Dept: FAMILY MEDICINE CLINIC | Facility: CLINIC | Age: 49
End: 2019-03-19

## 2019-03-19 VITALS
SYSTOLIC BLOOD PRESSURE: 120 MMHG | BODY MASS INDEX: 24.53 KG/M2 | WEIGHT: 143 LBS | OXYGEN SATURATION: 96 % | DIASTOLIC BLOOD PRESSURE: 70 MMHG | HEART RATE: 86 BPM | TEMPERATURE: 98.1 F

## 2019-03-19 DIAGNOSIS — Z12.39 BREAST CANCER SCREENING: Primary | ICD-10-CM

## 2019-03-19 DIAGNOSIS — E78.2 MIXED HYPERLIPIDEMIA: ICD-10-CM

## 2019-03-19 DIAGNOSIS — Z79.899 DRUG THERAPY: ICD-10-CM

## 2019-03-19 DIAGNOSIS — I10 ESSENTIAL HYPERTENSION: Primary | ICD-10-CM

## 2019-03-19 DIAGNOSIS — F41.9 ANXIETY: ICD-10-CM

## 2019-03-19 DIAGNOSIS — R63.4 WEIGHT LOSS: ICD-10-CM

## 2019-03-19 PROCEDURE — 99214 OFFICE O/P EST MOD 30 MIN: CPT | Performed by: FAMILY MEDICINE

## 2019-03-19 RX ORDER — BUSPIRONE HYDROCHLORIDE 7.5 MG/1
7.5 TABLET ORAL 2 TIMES DAILY
Qty: 60 TABLET | Refills: 5 | Status: SHIPPED | OUTPATIENT
Start: 2019-03-19 | End: 2019-12-26 | Stop reason: SDUPTHER

## 2019-03-19 NOTE — PROGRESS NOTES
Subjective   Myriam Thomas is a 48 y.o. female. Presents today for   Chief Complaint   Patient presents with   • Follow-up     med check and has not been on blood pressure med       Hypertension   This is a chronic problem. The current episode started more than 1 year ago. Progression since onset: Has not checked, but stopped all meds this past November. The problem is uncontrolled (Had been controlled). Associated symptoms include chest pain (Had single episode with dyspnea, felt like panic attack;  No cp with exertion.). Pertinent negatives include no headaches, malaise/fatigue, orthopnea, palpitations, peripheral edema, PND or shortness of breath. There are no associated agents to hypertension. Risk factors for coronary artery disease include dyslipidemia. Past treatments include angiotensin blockers and diuretics. Current antihypertension treatment includes nothing. Improvement on treatment: currently up. Compliance problems include psychosocial issues.  There is no history of kidney disease, CAD/MI, CVA, heart failure or left ventricular hypertrophy. There is no history of chronic renal disease, sleep apnea or a thyroid problem.   Depression   Visit Type: follow-up  Patient presents with the following symptoms: chest pain, depressed mood (mild, seeing therapist), excessive worry, insomnia (occly), nervousness/anxiety and weight loss (Weight is stable from last check).  Patient is not experiencing: anhedonia, palpitations, shortness of breath, suicidal ideas, suicidal planning, thoughts of death and weight gain.  Frequency of symptoms: occasionally   Severity: mild   Sleep quality: fair  Nighttime awakenings: occasional  Compliance with medications:  0-25%  Treatment side effects: had some nausea, but only takng every once a great while.  Hyperlipidemia   This is a chronic problem. The current episode started more than 1 year ago. The problem is controlled. Recent lipid tests were reviewed and are normal. She has  no history of chronic renal disease. Exacerbated by: off hctz. Associated symptoms include chest pain (Had single episode with dyspnea, felt like panic attack;  No cp with exertion.). Pertinent negatives include no shortness of breath. She is currently on no antihyperlipidemic treatment. Improvement on treatment: Stopped statin 11/2017. Compliance problems include psychosocial issues.  Risk factors for coronary artery disease include dyslipidemia and hypertension.   Reports weight loss, though stable now from last check.  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    Review of Systems   Constitutional: Positive for weight loss (Weight is stable from last check). Negative for malaise/fatigue and weight gain.   Respiratory: Negative for shortness of breath.    Cardiovascular: Positive for chest pain (Had single episode with dyspnea, felt like panic attack;  No cp with exertion.). Negative for palpitations, orthopnea and PND.   Neurological: Negative for headaches.   Psychiatric/Behavioral: Negative for suicidal ideas. The patient is nervous/anxious and has insomnia (occly).        Patient Active Problem List   Diagnosis   • Mitral valve insufficiency   • Palpitations   • Essential hypertension   • Anxiety   • Mixed hyperlipidemia   • B12 deficiency   • Smoker unmotivated to quit   • IUD (intrauterine device) in place       Social History     Socioeconomic History   • Marital status: Legally      Spouse name: Roddy   • Number of children: 3   • Years of education: 12   • Highest education level: Not on file   Occupational History   • Occupation:      Employer: [employer]   Tobacco Use   • Smoking status: Current Every Day Smoker     Packs/day: 1.00     Types: Cigarettes   • Smokeless tobacco: Never Used   Substance and Sexual Activity   • Alcohol use: Yes   • Drug use: No   • Sexual activity: Not Currently     Partners:  Male     Birth control/protection: IUD       Allergies   Allergen Reactions   • Hydrocodone-Acetaminophen Itching       Current Outpatient Medications on File Prior to Visit   Medication Sig Dispense Refill   • [DISCONTINUED] Ashwagandha 500 MG capsule Take  by mouth.     • [DISCONTINUED] aspirin 81 MG tablet Take 81 mg by mouth Daily.     • [DISCONTINUED] Cyanocobalamin (B-12) 5000 MCG capsule Take 300 mcg by mouth Daily.     • [DISCONTINUED] folic acid (FOLVITE) 400 MCG tablet Take 400 mcg by mouth daily.     • [DISCONTINUED] losartan-hydrochlorothiazide (HYZAAR) 100-12.5 MG per tablet TAKE 1 TABLET BY MOUTH DAILY. 90 tablet 1   • [DISCONTINUED] metoprolol succinate XL (TOPROL-XL) 50 MG 24 hr tablet TAKE 1 TABLET TWICE DAILY 180 tablet 1   • [DISCONTINUED] mirtazapine (REMERON) 15 MG tablet Change to 15mg po nightly x 7 nights, then 1/2 po nightly x 7 nights then stop 11 tablet 0   • [DISCONTINUED] rosuvastatin (CRESTOR) 20 MG tablet Take 1 tablet by mouth Daily. 90 tablet 0   • [DISCONTINUED] traZODone (DESYREL) 50 MG tablet Take 1 tablet by mouth Every Night. 30 tablet 4   • [DISCONTINUED] vitamin C (ASCORBIC ACID) 500 MG tablet Take 500 mg by mouth Daily.     • [DISCONTINUED] Vortioxetine HBr (TRINTELLIX) 20 MG tablet Take 20 mg by mouth Daily. 90 tablet 1     No current facility-administered medications on file prior to visit.        Objective   Vitals:    03/19/19 1018 03/19/19 1046   BP: 142/90 120/70   Pulse: 86    Temp: 98.1 °F (36.7 °C)    SpO2: 96%    Weight: 64.9 kg (143 lb)        Physical Exam   Constitutional: She appears well-developed and well-nourished.   HENT:   Head: Normocephalic and atraumatic.   Neck: Neck supple. No JVD present. No thyromegaly present.   Cardiovascular: Normal rate, regular rhythm and normal heart sounds. Exam reveals no gallop and no friction rub.   No murmur heard.  Pulmonary/Chest: Effort normal and breath sounds normal. No respiratory distress. She has no wheezes. She  has no rales.   Abdominal: Soft. Bowel sounds are normal. She exhibits no distension. There is no tenderness. There is no rebound and no guarding.   Musculoskeletal: She exhibits no edema.   Neurological: She is alert.   Skin: Skin is warm and dry.   Psychiatric: She has a normal mood and affect. Her behavior is normal.   Nursing note and vitals reviewed.      Assessment/Plan   Myriam was seen today for follow-up.    Diagnoses and all orders for this visit:    Essential hypertension  -     Comprehensive Metabolic Panel  -     TSH  -     CBC & Differential    Mixed hyperlipidemia  -     Comprehensive Metabolic Panel  -     Lipid Panel  -     TSH  -     CBC & Differential    Anxiety  -     busPIRone (BUSPAR) 7.5 MG tablet; Take 1 tablet by mouth 2 (Two) Times a Day.    Drug therapy  -     Comprehensive Metabolic Panel  -     Lipid Panel  -     TSH  -     CBC & Differential    Weight loss  -     Comprehensive Metabolic Panel  -     TSH  -     CBC & Differential    -bp normal on recheck, has lost weight;  For now will just monitor for now  -hld - off statin, will recheck;  Has family hx of heart disease  -anxiety - will try buspar for anxiety as doing better overall  -continue therapy;  Seek care if thoughts of self harm         -Follow up: 3 months and prn

## 2019-03-22 ENCOUNTER — TELEPHONE (OUTPATIENT)
Dept: FAMILY MEDICINE CLINIC | Facility: CLINIC | Age: 49
End: 2019-03-22

## 2019-03-25 NOTE — TELEPHONE ENCOUNTER
Kidney, liver function normal  Blood counts normal  Overall cholesterol looks pretty good.  Thyroid appropriate range.

## 2019-06-07 ENCOUNTER — OFFICE VISIT (OUTPATIENT)
Dept: FAMILY MEDICINE CLINIC | Facility: CLINIC | Age: 49
End: 2019-06-07

## 2019-06-07 VITALS
DIASTOLIC BLOOD PRESSURE: 78 MMHG | OXYGEN SATURATION: 98 % | BODY MASS INDEX: 24.75 KG/M2 | WEIGHT: 145 LBS | TEMPERATURE: 97.9 F | SYSTOLIC BLOOD PRESSURE: 125 MMHG | HEART RATE: 73 BPM | HEIGHT: 64 IN

## 2019-06-07 DIAGNOSIS — F41.9 ANXIETY: Primary | ICD-10-CM

## 2019-06-07 PROCEDURE — 99213 OFFICE O/P EST LOW 20 MIN: CPT | Performed by: FAMILY MEDICINE

## 2019-06-07 NOTE — PROGRESS NOTES
Subjective   Myriam Thomas is a 48 y.o. female. Presents today for   Chief Complaint   Patient presents with   • Follow-up     3 month    • Anxiety       History of Present Illness  Patient here for f/u of anxiety;  Doing well on buspar but only taking once daily as cannot sleep with medication, still issues but better not taking;  Would like take both doses in am.  Does feel helps with anxiety.    Review of Systems   Psychiatric/Behavioral: Positive for sleep disturbance. Negative for self-injury and suicidal ideas. The patient is nervous/anxious.        Patient Active Problem List   Diagnosis   • Mitral valve insufficiency   • Palpitations   • Essential hypertension   • Anxiety   • Mixed hyperlipidemia   • B12 deficiency   • Smoker unmotivated to quit   • IUD (intrauterine device) in place       Social History     Socioeconomic History   • Marital status: Legally      Spouse name: Roddy   • Number of children: 3   • Years of education: 12   • Highest education level: Not on file   Occupational History   • Occupation:      Employer: [employer]   Tobacco Use   • Smoking status: Current Every Day Smoker     Packs/day: 1.00     Types: Cigarettes   • Smokeless tobacco: Never Used   Substance and Sexual Activity   • Alcohol use: Yes   • Drug use: No   • Sexual activity: Not Currently     Partners: Male     Birth control/protection: IUD       Allergies   Allergen Reactions   • Hydrocodone-Acetaminophen Itching       Current Outpatient Medications on File Prior to Visit   Medication Sig Dispense Refill   • busPIRone (BUSPAR) 7.5 MG tablet Take 1 tablet by mouth 2 (Two) Times a Day. 60 tablet 5     No current facility-administered medications on file prior to visit.        Objective   Vitals:    06/07/19 1403   BP: 125/78   BP Location: Right arm   Patient Position: Sitting   Cuff Size: Adult   Pulse: 73   Temp: 97.9 °F (36.6 °C)   TempSrc: Oral   SpO2: 98%   Weight: 65.8 kg (145 lb)  "  Height: 162.6 cm (64.02\")       Physical Exam   Constitutional: She is oriented to person, place, and time. She appears well-developed and well-nourished.   Neurological: She is alert and oriented to person, place, and time.   Skin: Skin is warm and dry.   Psychiatric: She has a normal mood and affect. Her behavior is normal.   Nursing note and vitals reviewed.      Assessment/Plan   Myriam was seen today for follow-up and anxiety.    Diagnoses and all orders for this visit:    Anxiety    -doing well with buspar though only once daily;  Ok take both sametime.           -Follow up: 6 months and prn  "

## 2019-07-22 ENCOUNTER — OFFICE VISIT (OUTPATIENT)
Dept: CARDIOLOGY | Facility: CLINIC | Age: 49
End: 2019-07-22

## 2019-07-22 VITALS
BODY MASS INDEX: 24.75 KG/M2 | HEIGHT: 64 IN | SYSTOLIC BLOOD PRESSURE: 185 MMHG | HEART RATE: 76 BPM | WEIGHT: 145 LBS | DIASTOLIC BLOOD PRESSURE: 110 MMHG

## 2019-07-22 DIAGNOSIS — F41.9 ANXIETY: ICD-10-CM

## 2019-07-22 DIAGNOSIS — R00.2 PALPITATIONS: Primary | ICD-10-CM

## 2019-07-22 DIAGNOSIS — I10 ESSENTIAL HYPERTENSION: ICD-10-CM

## 2019-07-22 DIAGNOSIS — Z72.0 TOBACCO USE: ICD-10-CM

## 2019-07-22 PROCEDURE — 99214 OFFICE O/P EST MOD 30 MIN: CPT | Performed by: INTERNAL MEDICINE

## 2019-07-22 PROCEDURE — 93000 ELECTROCARDIOGRAM COMPLETE: CPT | Performed by: INTERNAL MEDICINE

## 2019-07-22 RX ORDER — BISOPROLOL FUMARATE AND HYDROCHLOROTHIAZIDE 5; 6.25 MG/1; MG/1
1 TABLET ORAL DAILY
Qty: 30 TABLET | Refills: 6 | Status: SHIPPED | OUTPATIENT
Start: 2019-07-22 | End: 2019-08-29 | Stop reason: SDUPTHER

## 2019-08-26 ENCOUNTER — OFFICE VISIT (OUTPATIENT)
Dept: CARDIOLOGY | Facility: CLINIC | Age: 49
End: 2019-08-26

## 2019-08-26 VITALS
WEIGHT: 145 LBS | BODY MASS INDEX: 24.75 KG/M2 | HEIGHT: 64 IN | HEART RATE: 86 BPM | SYSTOLIC BLOOD PRESSURE: 182 MMHG | DIASTOLIC BLOOD PRESSURE: 107 MMHG

## 2019-08-26 DIAGNOSIS — R00.2 PALPITATIONS: ICD-10-CM

## 2019-08-26 DIAGNOSIS — I10 ESSENTIAL HYPERTENSION: ICD-10-CM

## 2019-08-26 DIAGNOSIS — E78.2 MIXED HYPERLIPIDEMIA: ICD-10-CM

## 2019-08-26 DIAGNOSIS — I34.0 MITRAL VALVE INSUFFICIENCY, UNSPECIFIED ETIOLOGY: Primary | ICD-10-CM

## 2019-08-26 PROCEDURE — 99214 OFFICE O/P EST MOD 30 MIN: CPT | Performed by: INTERNAL MEDICINE

## 2019-08-26 RX ORDER — OLMESARTAN MEDOXOMIL AND HYDROCHLOROTHIAZIDE 20/12.5 20; 12.5 MG/1; MG/1
1 TABLET ORAL DAILY
Qty: 30 TABLET | Refills: 11 | Status: SHIPPED | OUTPATIENT
Start: 2019-08-26 | End: 2020-08-25

## 2019-08-29 ENCOUNTER — TELEPHONE (OUTPATIENT)
Dept: CARDIOLOGY | Facility: CLINIC | Age: 49
End: 2019-08-29

## 2019-08-29 RX ORDER — BISOPROLOL FUMARATE AND HYDROCHLOROTHIAZIDE 5; 6.25 MG/1; MG/1
1 TABLET ORAL DAILY
Qty: 30 TABLET | Refills: 6 | Status: SHIPPED | OUTPATIENT
Start: 2019-08-29 | End: 2020-06-22

## 2019-08-29 NOTE — TELEPHONE ENCOUNTER
----- Message from Maria Luisa CHAVEZ Sorto sent at 8/29/2019 10:17 AM EDT -----  Regarding: VERIFICATION IF SHE SHOULD TAKE BENICAR W/ZIAC OR JUST BENICAR?  Contact: [phone]  WHEN SHE WENT TO THE RX THE ZIAC WAS NOT PRESCRIBED   PLEASE ADVISE

## 2019-10-03 ENCOUNTER — OFFICE VISIT (OUTPATIENT)
Dept: CARDIOLOGY | Facility: CLINIC | Age: 49
End: 2019-10-03

## 2019-10-03 VITALS
SYSTOLIC BLOOD PRESSURE: 120 MMHG | HEIGHT: 64 IN | DIASTOLIC BLOOD PRESSURE: 84 MMHG | WEIGHT: 147 LBS | BODY MASS INDEX: 25.1 KG/M2 | HEART RATE: 48 BPM

## 2019-10-03 DIAGNOSIS — R00.2 PALPITATIONS: ICD-10-CM

## 2019-10-03 DIAGNOSIS — F41.9 ANXIETY: ICD-10-CM

## 2019-10-03 DIAGNOSIS — E78.2 MIXED HYPERLIPIDEMIA: ICD-10-CM

## 2019-10-03 DIAGNOSIS — Z72.0 TOBACCO USE: ICD-10-CM

## 2019-10-03 DIAGNOSIS — R07.89 CHEST PAIN, ATYPICAL: Primary | ICD-10-CM

## 2019-10-03 DIAGNOSIS — I10 ESSENTIAL HYPERTENSION: ICD-10-CM

## 2019-10-03 PROCEDURE — 99213 OFFICE O/P EST LOW 20 MIN: CPT | Performed by: INTERNAL MEDICINE

## 2019-10-03 NOTE — PROGRESS NOTES
Subjective:        Myriam Thomas is a 48 y.o. female who here for follow up    CC  ONE EPISODE OF ELEPHANT IN CHEST    FOLLOW UP BENICAR/HCT  HPI  48 years old with known history of atypical chest pain tobacco abuse anxiety benign essential arterial hypertension as well as palpitation had an one episode of feeling elephant sitting on the chest, sudden onset with moderate intensity    Patient here also for the follow-up after the patient has been started on the Benicar HCT     Problem List Items Addressed This Visit        Cardiovascular and Mediastinum    Palpitations    Essential hypertension    Mixed hyperlipidemia       Other    Tobacco use    Anxiety      Other Visit Diagnoses     Chest pain, atypical    -  Primary    Relevant Orders    Stress Test With Myocardial Perfusion One Day        .    The following portions of the patient's history were reviewed and updated as appropriate: allergies, current medications, past family history, past medical history, past social history, past surgical history and problem list.    Past Medical History:   Diagnosis Date   • Anxiety    • Depression    • HTN (hypertension)    • Hyperlipidemia    • Mitral regurgitation    • Palpitations    • Renal calculi      reports that she has been smoking cigarettes.  She has been smoking about 1.00 pack per day. She has never used smokeless tobacco. She reports that she drinks alcohol. She reports that she does not use drugs.   Family History   Problem Relation Age of Onset   • Hypertension Mother    • Heart attack Mother    • Diabetes Mother    • Heart disease Father         unsure full hx.   • Cancer Brother    • Breast cancer Maternal Grandmother         50s recurrence in her late 60s   • Colon cancer Maternal Grandmother         70s y/o   • Hypertension Maternal Grandmother    • Alzheimer's disease Maternal Grandmother    • Hypertension Maternal Grandfather    • Emphysema Maternal Grandfather    • Diabetes Paternal Grandmother    • No  "Known Problems Paternal Grandfather        Review of Systems  Constitutional: No wt loss, fever, fatigue  Gastrointestinal: No nausea, abdominal pain  Behavioral/Psych: No insomnia or anxiety   Cardiovascular chest tightness  Objective:       Physical Exam  /84 (BP Location: Left arm, Patient Position: Sitting)   Pulse (!) 48   Ht 162.6 cm (64\")   Wt 66.7 kg (147 lb)   BMI 25.23 kg/m²   General appearance: No acute changes   Neck: Trachea midline; NECK, supple, no thyromegaly or lymphadenopathy   Lungs: Normal size and shape, normal breath sounds, equal distribution of air, no rales and rhonchi   CV: S1-S2 regular, no murmurs, no rub, no gallop   Abdomen: Soft, non-tender; no masses , no abnormal abdominal sounds   Extremities: No deformity , normal color , no peripheral edema   Skin: Normal temperature, turgor and texture; no rash, ulcers          Procedures      Echocardiogram:        Current Outpatient Medications:   •  bisoprolol-hydrochlorothiazide (ZIAC) 5-6.25 MG per tablet, Take 1 tablet by mouth Daily., Disp: 30 tablet, Rfl: 6  •  busPIRone (BUSPAR) 7.5 MG tablet, Take 1 tablet by mouth 2 (Two) Times a Day., Disp: 60 tablet, Rfl: 5  •  Misc Natural Products (DANDELION ROOT PO), Take  by mouth., Disp: , Rfl:   •  olmesartan-hydrochlorothiazide (BENICAR HCT) 20-12.5 MG per tablet, Take 1 tablet by mouth Daily., Disp: 30 tablet, Rfl: 11  •  Unable to find, 1 each 1 (One) Time. APOLINAR, Disp: , Rfl:    Assessment:        Patient Active Problem List   Diagnosis   • Mitral valve insufficiency   • Palpitations   • Essential hypertension   • Tobacco use   • Anxiety   • Mixed hyperlipidemia   • B12 deficiency   • Smoker unmotivated to quit   • IUD (intrauterine device) in place               Plan:            ICD-10-CM ICD-9-CM   1. Chest pain, atypical R07.89 786.59   2. Mixed hyperlipidemia E78.2 272.2   3. Palpitations R00.2 785.1   4. Tobacco use Z72.0 305.1   5. Essential hypertension I10 401.9   6. " Anxiety F41.9 300.00     1. Chest pain, atypical  Considering the patient's symptoms as well as clinical situation and  EKG findings, along with cardiac risk factors, ischemic workup is necessary to rule out ischemic cardiomyopathy, stress induced arrhythmias, and functional capacity for diagnosis as well as prognostic consideration    - Stress Test With Myocardial Perfusion One Day    2. Mixed hyperlipidemia  Continue current medications    3. Palpitations  Controlled    4. Tobacco use  Counseling    5. Essential hypertension  Blood pressure under control    6. Anxiety  Counseling has been done       CAN NOT WALK, WALKING LEXISCAN, ECHO    SEE IN 2 WKS  COUNSELING:    Myriam Leyva was given to patient for the following topics: diagnostic results, risk factor reductions, impressions, risks and benefits of treatment options and importance of treatment compliance .       SMOKING COUNSELING:    [unfilled]    Dictated using Dragon dictation

## 2019-10-31 ENCOUNTER — HOSPITAL ENCOUNTER (OUTPATIENT)
Dept: CARDIOLOGY | Facility: HOSPITAL | Age: 49
Discharge: HOME | End: 2019-10-31

## 2019-10-31 VITALS
HEIGHT: 64 IN | BODY MASS INDEX: 24.75 KG/M2 | DIASTOLIC BLOOD PRESSURE: 86 MMHG | HEART RATE: 54 BPM | WEIGHT: 145 LBS | OXYGEN SATURATION: 99 % | SYSTOLIC BLOOD PRESSURE: 143 MMHG

## 2019-10-31 LAB
BH CV STRESS BP STAGE 1: NORMAL
BH CV STRESS COMMENTS STAGE 1: NORMAL
BH CV STRESS DOSE REGADENOSON STAGE 1: 0.4
BH CV STRESS DURATION MIN STAGE 1: 2
BH CV STRESS DURATION SEC STAGE 1: 32
BH CV STRESS GRADE STAGE 1: 0
BH CV STRESS HR STAGE 1: 120
BH CV STRESS METS STAGE 1: 1.4
BH CV STRESS PROTOCOL 1: NORMAL
BH CV STRESS RECOVERY BP: NORMAL MMHG
BH CV STRESS RECOVERY HR: 71 BPM
BH CV STRESS RECOVERY O2: 99 %
BH CV STRESS SPEED STAGE 1: 1
BH CV STRESS STAGE 1: 1
LV EF NUC BP: 60 %
MAXIMAL PREDICTED HEART RATE: 174 BPM
PERCENT MAX PREDICTED HR: 68.97 %
STRESS BASELINE BP: NORMAL MMHG
STRESS BASELINE HR: 55 BPM
STRESS O2 SAT REST: 99 %
STRESS PERCENT HR: 81 %
STRESS POST ESTIMATED WORKLOAD: 1.4 METS
STRESS POST EXERCISE DUR MIN: 2 MIN
STRESS POST EXERCISE DUR SEC: 32 SEC
STRESS POST PEAK BP: NORMAL MMHG
STRESS POST PEAK HR: 120 BPM
STRESS TARGET HR: 148 BPM

## 2019-10-31 PROCEDURE — 0 TECHNETIUM SESTAMIBI: Performed by: INTERNAL MEDICINE

## 2019-10-31 PROCEDURE — 93016 CV STRESS TEST SUPVJ ONLY: CPT | Performed by: INTERNAL MEDICINE

## 2019-10-31 PROCEDURE — 78452 HT MUSCLE IMAGE SPECT MULT: CPT

## 2019-10-31 PROCEDURE — 25010000002 REGADENOSON 0.4 MG/5ML SOLUTION: Performed by: INTERNAL MEDICINE

## 2019-10-31 PROCEDURE — A9500 TC99M SESTAMIBI: HCPCS | Performed by: INTERNAL MEDICINE

## 2019-10-31 PROCEDURE — 93018 CV STRESS TEST I&R ONLY: CPT | Performed by: INTERNAL MEDICINE

## 2019-10-31 PROCEDURE — 93017 CV STRESS TEST TRACING ONLY: CPT

## 2019-10-31 PROCEDURE — 78452 HT MUSCLE IMAGE SPECT MULT: CPT | Performed by: INTERNAL MEDICINE

## 2019-10-31 RX ADMIN — TECHNETIUM TC 99M SESTAMIBI 1 DOSE: 1 INJECTION INTRAVENOUS at 08:50

## 2019-10-31 RX ADMIN — TECHNETIUM TC 99M SESTAMIBI 1 DOSE: 1 INJECTION INTRAVENOUS at 11:25

## 2019-10-31 RX ADMIN — REGADENOSON 0.4 MG: 0.08 INJECTION, SOLUTION INTRAVENOUS at 11:25

## 2019-11-11 ENCOUNTER — OFFICE VISIT (OUTPATIENT)
Dept: CARDIOLOGY | Facility: CLINIC | Age: 49
End: 2019-11-11

## 2019-11-11 VITALS
HEART RATE: 54 BPM | DIASTOLIC BLOOD PRESSURE: 85 MMHG | HEIGHT: 64 IN | WEIGHT: 153 LBS | SYSTOLIC BLOOD PRESSURE: 132 MMHG | BODY MASS INDEX: 26.12 KG/M2

## 2019-11-11 DIAGNOSIS — I34.0 MITRAL VALVE INSUFFICIENCY, UNSPECIFIED ETIOLOGY: ICD-10-CM

## 2019-11-11 DIAGNOSIS — I10 ESSENTIAL HYPERTENSION: ICD-10-CM

## 2019-11-11 DIAGNOSIS — R00.2 PALPITATIONS: Primary | ICD-10-CM

## 2019-11-11 DIAGNOSIS — F41.9 ANXIETY: ICD-10-CM

## 2019-11-11 DIAGNOSIS — E78.2 MIXED HYPERLIPIDEMIA: ICD-10-CM

## 2019-11-11 PROCEDURE — 99213 OFFICE O/P EST LOW 20 MIN: CPT | Performed by: INTERNAL MEDICINE

## 2019-11-11 NOTE — PROGRESS NOTES
Subjective:        Myriam Thomas is a 49 y.o. female who here for follow up    CC  The follow-up after the stress test  HPI  49-year-old female with known history of palpitation, benign essential arterial hypertension hyperlipidemia and anxiety here for the follow-up after the stress test which is normal     Problem List Items Addressed This Visit        Cardiovascular and Mediastinum    Mitral valve insufficiency    Palpitations - Primary    Essential hypertension    Mixed hyperlipidemia       Other    Anxiety        .Interpretation Summary        · Findings consistent with a normal ECG stress test.  · Left ventricular ejection fraction is normal (Calculated EF = 60%).  · Myocardial perfusion imaging indicates a normal myocardial perfusion study with no evidence of ischemia.  · Impressions are consistent with a low risk study.         The following portions of the patient's history were reviewed and updated as appropriate: allergies, current medications, past family history, past medical history, past social history, past surgical history and problem list.    Past Medical History:   Diagnosis Date   • Anxiety    • Depression    • HTN (hypertension)    • Hyperlipidemia    • Mitral regurgitation    • Palpitations    • Renal calculi      reports that she has been smoking cigarettes.  She has a 26.00 pack-year smoking history. She has never used smokeless tobacco. She reports that she drinks alcohol. She reports that she does not use drugs.   Family History   Problem Relation Age of Onset   • Hypertension Mother    • Heart attack Mother    • Diabetes Mother    • Heart disease Father         unsure full hx.   • Cancer Brother    • Breast cancer Maternal Grandmother         50s recurrence in her late 60s   • Colon cancer Maternal Grandmother         70s y/o   • Hypertension Maternal Grandmother    • Alzheimer's disease Maternal Grandmother    • Hypertension Maternal Grandfather    • Emphysema Maternal Grandfather    •  "Diabetes Paternal Grandmother    • No Known Problems Paternal Grandfather        Review of Systems  Constitutional: No wt loss, fever, fatigue  Gastrointestinal: No nausea, abdominal pain  Behavioral/Psych: No insomnia or anxiety   Cardiovascular no chest pains or tightness no chest  Objective:       Physical Exam  /85   Pulse 54   Ht 162.6 cm (64\")   Wt 69.4 kg (153 lb)   BMI 26.26 kg/m²   General appearance: No acute changes   Neck: Trachea midline; NECK, supple, no thyromegaly or lymphadenopathy   Lungs: Normal size and shape, normal breath sounds, equal distribution of air, no rales and rhonchi   CV: S1-S2 regular, no murmurs, no rub, no gallop   Abdomen: Soft, non-tender; no masses , no abnormal abdominal sounds   Extremities: No deformity , normal color , no peripheral edema   Skin: Normal temperature, turgor and texture; no rash, ulcers          Procedures      Echocardiogram:        Current Outpatient Medications:   •  bisoprolol-hydrochlorothiazide (ZIAC) 5-6.25 MG per tablet, Take 1 tablet by mouth Daily., Disp: 30 tablet, Rfl: 6  •  busPIRone (BUSPAR) 7.5 MG tablet, Take 1 tablet by mouth 2 (Two) Times a Day., Disp: 60 tablet, Rfl: 5  •  olmesartan-hydrochlorothiazide (BENICAR HCT) 20-12.5 MG per tablet, Take 1 tablet by mouth Daily., Disp: 30 tablet, Rfl: 11   Assessment:        Patient Active Problem List   Diagnosis   • Mitral valve insufficiency   • Palpitations   • Essential hypertension   • Tobacco use   • Anxiety   • Mixed hyperlipidemia   • B12 deficiency   • Smoker unmotivated to quit   • IUD (intrauterine device) in place               Plan:            ICD-10-CM ICD-9-CM   1. Palpitations R00.2 785.1   2. Mixed hyperlipidemia E78.2 272.2   3. Essential hypertension I10 401.9   4. Mitral valve insufficiency, unspecified etiology I34.0 424.0   5. Anxiety F41.9 300.00     1. Palpitations  Palpitations under control    2. Mixed hyperlipidemia  Counseling has been done    3. Essential " hypertension  The blood pressure controlled    4. Mitral valve insufficiency, unspecified etiology  Treat conservatively    5. Anxiety  Counseling has been done       Specificity and sensitivity of the stress test/ cardiac workup has been explained. Pt has been explained if  Symptoms continue please go to ER, and further w/p will be required.    Also explained this does not rule out coronary artery disease or the future events, continue to emphasize on risk reductions for coronary artery disease    Pt also advised to contact PCP for other causes of symptoms    SEE IN 1 YR  COUNSELING:    Myriam eLyva was given to patient for the following topics: diagnostic results, risk factor reductions, impressions, risks and benefits of treatment options and importance of treatment compliance .       SMOKING COUNSELING:    [unfilled]    Dictated using Dragon dictation

## 2019-12-26 DIAGNOSIS — F41.9 ANXIETY: ICD-10-CM

## 2019-12-26 RX ORDER — BUSPIRONE HYDROCHLORIDE 7.5 MG/1
7.5 TABLET ORAL 2 TIMES DAILY
Qty: 60 TABLET | Refills: 2 | Status: SHIPPED | OUTPATIENT
Start: 2019-12-26 | End: 2020-05-04

## 2020-02-24 ENCOUNTER — OFFICE VISIT (OUTPATIENT)
Dept: FAMILY MEDICINE CLINIC | Facility: CLINIC | Age: 50
End: 2020-02-24

## 2020-02-24 VITALS
OXYGEN SATURATION: 97 % | HEIGHT: 64 IN | BODY MASS INDEX: 28.68 KG/M2 | DIASTOLIC BLOOD PRESSURE: 78 MMHG | WEIGHT: 168 LBS | HEART RATE: 77 BPM | SYSTOLIC BLOOD PRESSURE: 120 MMHG

## 2020-02-24 DIAGNOSIS — F41.9 ANXIETY: Primary | ICD-10-CM

## 2020-02-24 PROCEDURE — 99213 OFFICE O/P EST LOW 20 MIN: CPT | Performed by: FAMILY MEDICINE

## 2020-02-24 RX ORDER — FLUOXETINE HYDROCHLORIDE 20 MG/1
20 CAPSULE ORAL DAILY
Qty: 30 CAPSULE | Refills: 5 | Status: SHIPPED | OUTPATIENT
Start: 2020-02-24 | End: 2020-05-13 | Stop reason: SDUPTHER

## 2020-02-24 RX ORDER — FLUOXETINE 10 MG/1
TABLET, FILM COATED ORAL
Qty: 30 TABLET | Refills: 5 | Status: SHIPPED | OUTPATIENT
Start: 2020-02-24 | End: 2020-05-13 | Stop reason: DRUGHIGH

## 2020-02-24 NOTE — PROGRESS NOTES
Subjective   Myriam Thomas is a 49 y.o. female. Presents today for   Chief Complaint   Patient presents with   • Anxiety       Anxiety   Presents for follow-up visit. Symptoms include depressed mood, excessive worry, irritability, muscle tension and nervous/anxious behavior. Symptoms occur most days. The severity of symptoms is moderate. The quality of sleep is fair. Nighttime awakenings: occasional.     Compliance with medications is %. Treatment side effects: no side effects;  Not helping as [facility] now;  Wellbutrin didn't tolerate;  lexapro no relief;  Was on prozac 18 years ago for depression.       Review of Systems   Constitutional: Positive for irritability.   Psychiatric/Behavioral: The patient is nervous/anxious.        Patient Active Problem List   Diagnosis   • Mitral valve insufficiency   • Palpitations   • Essential hypertension   • Tobacco use   • Anxiety   • Mixed hyperlipidemia   • B12 deficiency   • Smoker unmotivated to quit   • IUD (intrauterine device) in place       Social History     Socioeconomic History   • Marital status: Legally      Spouse name: Roddy   • Number of children: 3   • Years of education: 12   • Highest education level: Not on file   Occupational History   • Occupation:      Employer: [employer]   Tobacco Use   • Smoking status: Current Every Day Smoker     Packs/day: 1.00     Years: 26.00     Pack years: 26.00     Types: Cigarettes     Start date: 1/1/1992   • Smokeless tobacco: Never Used   Substance and Sexual Activity   • Alcohol use: Yes     Comment: RARELY   • Drug use: No   • Sexual activity: Not Currently     Partners: Male     Birth control/protection: IUD       Allergies   Allergen Reactions   • Hydrocodone-Acetaminophen Itching       Current Outpatient Medications on File Prior to Visit   Medication Sig Dispense Refill   • bisoprolol-hydrochlorothiazide (ZIAC) 5-6.25 MG per tablet Take 1 tablet by mouth Daily. 30 tablet 6   •  "busPIRone (BUSPAR) 7.5 MG tablet Take 1 tablet by mouth 2 (Two) Times a Day. 60 tablet 2   • olmesartan-hydrochlorothiazide (BENICAR HCT) 20-12.5 MG per tablet Take 1 tablet by mouth Daily. 30 tablet 11     No current facility-administered medications on file prior to visit.        Objective   Vitals:    02/24/20 1811   BP: 120/78   BP Location: Left arm   Patient Position: Sitting   Cuff Size: Adult   Pulse: 77   SpO2: 97%   Weight: 76.2 kg (168 lb)   Height: 162.6 cm (64\")       Physical Exam   Constitutional: She is oriented to person, place, and time. She appears well-developed and well-nourished.   Neurological: She is alert and oriented to person, place, and time.   Skin: Skin is warm and dry.   Psychiatric: Her behavior is normal. Her mood appears anxious.   Nursing note and vitals reviewed.      Assessment/Plan   Myriam was seen today for anxiety.    Diagnoses and all orders for this visit:    Anxiety  -     FLUoxetine (PROZAC) 20 MG capsule; Take 1 capsule by mouth Daily.  -     FLUoxetine (PROzac) 10 MG tablet; 1/2 po daily x 14 days, then 1 po daily x 7 days then go to 20mg    seek care if not doing well or thoughts of self harm;  Continue buspar for now         -Follow up: 8 weeks and prn  "

## 2020-03-27 ENCOUNTER — TELEPHONE (OUTPATIENT)
Dept: FAMILY MEDICINE CLINIC | Facility: CLINIC | Age: 50
End: 2020-03-27

## 2020-03-27 RX ORDER — TRIAMCINOLONE ACETONIDE 0.25 MG/G
OINTMENT TOPICAL 2 TIMES DAILY
Qty: 30 G | Refills: 0 | Status: SHIPPED | OUTPATIENT
Start: 2020-03-27 | End: 2020-11-19

## 2020-03-27 NOTE — TELEPHONE ENCOUNTER
Triamcinolone 0.25% bid 30g 0rf--likely just irritation. Have bowels settled? Call if not improved 3-4 days

## 2020-03-27 NOTE — TELEPHONE ENCOUNTER
Patient says she has very loose bowels for 3 days. Going at least 10-15 times a day. Had stomach cramps for 2 days, seems to be gone today but noticed blood when wiping today. Unsure if this is because she may have raw skin from having so many BM's. wants to know what she should do.       [phone]

## 2020-05-02 DIAGNOSIS — F41.9 ANXIETY: ICD-10-CM

## 2020-05-04 RX ORDER — BUSPIRONE HYDROCHLORIDE 7.5 MG/1
TABLET ORAL
Qty: 60 TABLET | Refills: 0 | Status: SHIPPED | OUTPATIENT
Start: 2020-05-04 | End: 2020-06-22

## 2020-05-13 DIAGNOSIS — F41.9 ANXIETY: ICD-10-CM

## 2020-05-13 RX ORDER — FLUOXETINE HYDROCHLORIDE 20 MG/1
20 CAPSULE ORAL DAILY
Qty: 30 CAPSULE | Refills: 5 | Status: SHIPPED | OUTPATIENT
Start: 2020-05-13 | End: 2020-09-24 | Stop reason: SDUPTHER

## 2020-05-20 ENCOUNTER — OFFICE VISIT (OUTPATIENT)
Dept: FAMILY MEDICINE CLINIC | Facility: CLINIC | Age: 50
End: 2020-05-20

## 2020-05-20 VITALS
HEART RATE: 74 BPM | HEIGHT: 64 IN | WEIGHT: 162 LBS | OXYGEN SATURATION: 97 % | SYSTOLIC BLOOD PRESSURE: 118 MMHG | BODY MASS INDEX: 27.66 KG/M2 | DIASTOLIC BLOOD PRESSURE: 80 MMHG

## 2020-05-20 DIAGNOSIS — E78.2 MIXED HYPERLIPIDEMIA: ICD-10-CM

## 2020-05-20 DIAGNOSIS — F32.89 OTHER DEPRESSION: ICD-10-CM

## 2020-05-20 DIAGNOSIS — I10 ESSENTIAL HYPERTENSION: Primary | ICD-10-CM

## 2020-05-20 DIAGNOSIS — F41.9 ANXIETY: ICD-10-CM

## 2020-05-20 LAB
ALBUMIN SERPL-MCNC: 4.7 G/DL (ref 3.5–5.2)
ALBUMIN/GLOB SERPL: 2.1 G/DL
ALP SERPL-CCNC: 60 U/L (ref 39–117)
ALT SERPL-CCNC: 21 U/L (ref 1–33)
AST SERPL-CCNC: 22 U/L (ref 1–32)
BILIRUB SERPL-MCNC: 0.3 MG/DL (ref 0.2–1.2)
BUN SERPL-MCNC: 6 MG/DL (ref 6–20)
BUN/CREAT SERPL: 7.9 (ref 7–25)
CALCIUM SERPL-MCNC: 9.8 MG/DL (ref 8.6–10.5)
CHLORIDE SERPL-SCNC: 100 MMOL/L (ref 98–107)
CHOLEST SERPL-MCNC: 197 MG/DL (ref 0–200)
CO2 SERPL-SCNC: 25 MMOL/L (ref 22–29)
CREAT SERPL-MCNC: 0.76 MG/DL (ref 0.57–1)
GLOBULIN SER CALC-MCNC: 2.2 GM/DL
GLUCOSE SERPL-MCNC: 89 MG/DL (ref 65–99)
HDLC SERPL-MCNC: 31 MG/DL (ref 40–60)
LDLC SERPL CALC-MCNC: 93 MG/DL (ref 0–100)
POTASSIUM SERPL-SCNC: 4.5 MMOL/L (ref 3.5–5.2)
PROT SERPL-MCNC: 6.9 G/DL (ref 6–8.5)
SODIUM SERPL-SCNC: 136 MMOL/L (ref 136–145)
TRIGL SERPL-MCNC: 367 MG/DL (ref 0–150)
VLDLC SERPL CALC-MCNC: 73.4 MG/DL

## 2020-05-20 PROCEDURE — 99214 OFFICE O/P EST MOD 30 MIN: CPT | Performed by: FAMILY MEDICINE

## 2020-05-20 NOTE — PROGRESS NOTES
Subjective   Myriam Thomas is a 49 y.o. female. Presents today for   Chief Complaint   Patient presents with   • Anxiety   • Hypertension   • Depression     Answers for HPI/ROS submitted by the patient on 5/14/2020   What is the primary reason for your visit?: Other  Please describe your symptoms.: Follow up to new medication Dr Barahona prescribed for me in February.  Have you had these symptoms before?: Yes  How long have you been having these symptoms?: Other  Please list any medications you are currently taking for this condition.: Fluoxetine HCL 20 mg, Busprione HCL 7.5 mg, Bisoprolol-HCTZ 5-6.25 mg, Olmesartan-HCTZ 20-12.5 mg  Please describe any probable cause for these symptoms. : High Blood Pressure, Anxiety, Depression    Anxiety   Presents for follow-up visit. Symptoms include depressed mood, excessive worry, nervous/anxious behavior and palpitations (occly, mild;  overall controlled;  noted more since less active, gained weight and trapped in home due to social distancing.). Patient reports no chest pain, nausea or shortness of breath. Symptoms occur occasionally. The severity of symptoms is mild. The quality of sleep is fair. Nighttime awakenings: occasional.     Her past medical history is significant for depression. Compliance with medications is %. Treatment side effects: no side effects, going therapy weekly.   Hypertension   This is a chronic problem. The current episode started more than 1 year ago. The problem is unchanged. The problem is controlled. Associated symptoms include anxiety and palpitations (occly, mild;  overall controlled;  noted more since less active, gained weight and trapped in home due to social distancing.). Pertinent negatives include no chest pain, orthopnea, peripheral edema, PND or shortness of breath. There are no associated agents to hypertension. Past treatments include beta blockers, diuretics and angiotensin blockers. Current antihypertension treatment includes  diuretics, beta blockers and angiotensin blockers. The current treatment provides moderate improvement. There are no compliance problems.    Depression   Visit Type: follow-up  Patient presents with the following symptoms: anhedonia, depressed mood, excessive worry, nervousness/anxiety and palpitations (occly, mild;  overall controlled;  noted more since less active, gained weight and trapped in home due to social distancing.).  Patient is not experiencing: feelings of worthlessness and shortness of breath.  Frequency of symptoms: most days   Severity: mild   Sleep quality: fair  Nighttime awakenings: occasional  Compliance with medications:  %  Treatment side effects: no side effects;  going tehrapy weekly now.    Reports had 16 days of diarrhea, but resolved now.  Had similar episode when going through divorce with .    Review of Systems   Constitutional: Negative for chills and fever.   Respiratory: Negative for shortness of breath.    Cardiovascular: Positive for palpitations (occly, mild;  overall controlled;  noted more since less active, gained weight and trapped in home due to social distancing.). Negative for chest pain, orthopnea and PND.   Gastrointestinal: Negative for abdominal pain, blood in stool, nausea and vomiting.   Psychiatric/Behavioral: The patient is nervous/anxious.        Patient Active Problem List   Diagnosis   • Mitral valve insufficiency   • Palpitations   • Essential hypertension   • Tobacco use   • Anxiety   • Mixed hyperlipidemia   • B12 deficiency   • Smoker unmotivated to quit   • IUD (intrauterine device) in place       Social History     Socioeconomic History   • Marital status: Legally      Spouse name: Roddy   • Number of children: 3   • Years of education: 12   • Highest education level: Not on file   Occupational History   • Occupation:      Employer: [employer]   Tobacco Use   • Smoking status: Current Every Day Smoker      "Packs/day: 1.00     Years: 26.00     Pack years: 26.00     Types: Cigarettes     Start date: 1/1/1992   • Smokeless tobacco: Never Used   Substance and Sexual Activity   • Alcohol use: Yes     Comment: RARELY   • Drug use: No   • Sexual activity: Not Currently     Partners: Male     Birth control/protection: IUD       Allergies   Allergen Reactions   • Hydrocodone-Acetaminophen Itching       Current Outpatient Medications on File Prior to Visit   Medication Sig Dispense Refill   • bisoprolol-hydrochlorothiazide (ZIAC) 5-6.25 MG per tablet Take 1 tablet by mouth Daily. 30 tablet 6   • busPIRone (BUSPAR) 7.5 MG tablet TAKE 1 TABLET BY MOUTH TWICE A DAY 60 tablet 0   • FLUoxetine (PROzac) 20 MG capsule Take 1 capsule by mouth Daily. 30 capsule 5   • olmesartan-hydrochlorothiazide (BENICAR HCT) 20-12.5 MG per tablet Take 1 tablet by mouth Daily. 30 tablet 11   • triamcinolone (KENALOG) 0.025 % ointment Apply  topically to the appropriate area as directed 2 (Two) Times a Day. 30 g 0     No current facility-administered medications on file prior to visit.        Objective   Vitals:    05/20/20 0837   BP: 118/80   BP Location: Right arm   Patient Position: Sitting   Cuff Size: Adult   Pulse: 74   SpO2: 97%   Weight: 73.5 kg (162 lb)   Height: 162.6 cm (64\")     Body mass index is 27.81 kg/m².    Physical Exam   Constitutional: She appears well-developed and well-nourished.   HENT:   Head: Normocephalic and atraumatic.   Neck: Neck supple. No JVD present. No thyromegaly present.   Cardiovascular: Normal rate, regular rhythm and normal heart sounds. Exam reveals no gallop and no friction rub.   No murmur heard.  Pulmonary/Chest: Effort normal and breath sounds normal. No respiratory distress. She has no wheezes. She has no rales.   Abdominal: Soft. Bowel sounds are normal. She exhibits no distension. There is no tenderness. There is no rebound and no guarding.   Musculoskeletal: She exhibits no edema.   Neurological: She is " alert.   Skin: Skin is warm and dry.   Psychiatric: She has a normal mood and affect. Her behavior is normal.   Nursing note and vitals reviewed.      Assessment/Plan   Myriam was seen today for anxiety, hypertension and depression.    Diagnoses and all orders for this visit:    Essential hypertension  -     Comprehensive Metabolic Panel    Mixed hyperlipidemia  -     Comprehensive Metabolic Panel  -     Lipid Panel    Anxiety    Other depression    -low hdl, rest of lipids very low too;  willl recheck as due  -hypertension - controlled, continue medications  -continue buspar, fluoxetine and therapy.         -Follow up: 6 months and prn

## 2020-05-26 NOTE — PROGRESS NOTES
Call and mail copy of results to patient.  Kidney and liver function normal  Cholesterol good range, triglycerides elevated - would work on diet and exercise.

## 2020-06-22 DIAGNOSIS — F41.9 ANXIETY: ICD-10-CM

## 2020-06-22 RX ORDER — BUSPIRONE HYDROCHLORIDE 7.5 MG/1
TABLET ORAL
Qty: 60 TABLET | Refills: 5 | Status: SHIPPED | OUTPATIENT
Start: 2020-06-22 | End: 2021-07-16 | Stop reason: SDUPTHER

## 2020-06-22 RX ORDER — BISOPROLOL FUMARATE AND HYDROCHLOROTHIAZIDE 5; 6.25 MG/1; MG/1
TABLET ORAL
Qty: 90 TABLET | Refills: 0 | Status: SHIPPED | OUTPATIENT
Start: 2020-06-22 | End: 2020-09-18

## 2020-09-18 RX ORDER — BISOPROLOL FUMARATE AND HYDROCHLOROTHIAZIDE 5; 6.25 MG/1; MG/1
TABLET ORAL
Qty: 90 TABLET | Refills: 0 | Status: SHIPPED | OUTPATIENT
Start: 2020-09-18 | End: 2021-02-02

## 2020-09-24 DIAGNOSIS — F41.9 ANXIETY: ICD-10-CM

## 2020-09-24 RX ORDER — FLUOXETINE HYDROCHLORIDE 20 MG/1
20 CAPSULE ORAL DAILY
Qty: 90 CAPSULE | Refills: 0 | Status: SHIPPED | OUTPATIENT
Start: 2020-09-24 | End: 2021-02-01 | Stop reason: SDUPTHER

## 2020-10-07 RX ORDER — OLMESARTAN MEDOXOMIL AND HYDROCHLOROTHIAZIDE 20/12.5 20; 12.5 MG/1; MG/1
TABLET ORAL
Qty: 30 TABLET | Refills: 0 | Status: SHIPPED | OUTPATIENT
Start: 2020-10-07 | End: 2021-02-03

## 2020-11-19 ENCOUNTER — OFFICE VISIT (OUTPATIENT)
Dept: CARDIOLOGY | Facility: CLINIC | Age: 50
End: 2020-11-19

## 2020-11-19 VITALS
BODY MASS INDEX: 27.83 KG/M2 | SYSTOLIC BLOOD PRESSURE: 137 MMHG | WEIGHT: 163 LBS | DIASTOLIC BLOOD PRESSURE: 91 MMHG | HEIGHT: 64 IN | HEART RATE: 69 BPM

## 2020-11-19 DIAGNOSIS — Z72.0 TOBACCO USE: ICD-10-CM

## 2020-11-19 DIAGNOSIS — R06.02 SOB (SHORTNESS OF BREATH): Primary | ICD-10-CM

## 2020-11-19 DIAGNOSIS — I10 ESSENTIAL HYPERTENSION: ICD-10-CM

## 2020-11-19 DIAGNOSIS — R00.2 PALPITATIONS: ICD-10-CM

## 2020-11-19 DIAGNOSIS — I34.0 MITRAL VALVE INSUFFICIENCY, UNSPECIFIED ETIOLOGY: ICD-10-CM

## 2020-11-19 DIAGNOSIS — E78.2 MIXED HYPERLIPIDEMIA: ICD-10-CM

## 2020-11-19 PROCEDURE — 93000 ELECTROCARDIOGRAM COMPLETE: CPT | Performed by: INTERNAL MEDICINE

## 2020-11-19 PROCEDURE — 99213 OFFICE O/P EST LOW 20 MIN: CPT | Performed by: INTERNAL MEDICINE

## 2020-11-19 NOTE — PROGRESS NOTES
1 YR    Subjective:        Myriam Thomas is a 50 y.o. female who here for follow up    CC  SOB AT NIGHT   HPI  50-year-old female with known history of palpitations, benign essential arterial hypertension, hyperlipidemia mitral insufficiency has been complaining of shortness of breath at night     Problems Addressed this Visit        Cardiovascular and Mediastinum    Mitral valve insufficiency    Palpitations - Primary    Essential hypertension    Mixed hyperlipidemia       Other    Tobacco use      Diagnoses       Codes Comments    Palpitations    -  Primary ICD-10-CM: R00.2  ICD-9-CM: 785.1     Essential hypertension     ICD-10-CM: I10  ICD-9-CM: 401.9     Mitral valve insufficiency, unspecified etiology     ICD-10-CM: I34.0  ICD-9-CM: 424.0     Mixed hyperlipidemia     ICD-10-CM: E78.2  ICD-9-CM: 272.2     Tobacco use     ICD-10-CM: Z72.0  ICD-9-CM: 305.1         .  Component   Ref Range & Units 6mo ago 2yr ago   Total Cholesterol   0 - 200 mg/dL 197  63    Triglycerides   0 - 150 mg/dL 367High   60    HDL Cholesterol   40 - 60 mg/dL 31Low   36Low     VLDL Cholesterol   mg/dL 73.4  12 R    LDL Cholesterol    0 - 100 mg/dL 93  15          The following portions of the patient's history were reviewed and updated as appropriate: allergies, current medications, past family history, past medical history, past social history, past surgical history and problem list.    Past Medical History:   Diagnosis Date   • Anxiety    • Depression    • HTN (hypertension)    • Hyperlipidemia    • Mitral regurgitation    • Palpitations    • Renal calculi      reports that she has been smoking cigarettes. She started smoking about 28 years ago. She has a 26.00 pack-year smoking history. She has never used smokeless tobacco. She reports current alcohol use. She reports that she does not use drugs.   Family History   Problem Relation Age of Onset   • Hypertension Mother    • Heart attack Mother    • Diabetes Mother    • Heart disease  "Father         unsure full hx.   • Cancer Brother    • Breast cancer Maternal Grandmother         50s recurrence in her late 60s   • Colon cancer Maternal Grandmother         70s y/o   • Hypertension Maternal Grandmother    • Alzheimer's disease Maternal Grandmother    • Hypertension Maternal Grandfather    • Emphysema Maternal Grandfather    • Diabetes Paternal Grandmother    • No Known Problems Paternal Grandfather        Review of Systems  Constitutional: No wt loss, fever, fatigue  Gastrointestinal: No nausea, abdominal pain  Behavioral/Psych: No insomnia or anxiety   Cardiovascular shortness of breath  Objective:       Physical Exam  /91   Pulse 69   Ht 162.6 cm (64\")   Wt 73.9 kg (163 lb)   BMI 27.98 kg/m²   General appearance: No acute changes   Neck: Trachea midline; NECK, supple, no thyromegaly or lymphadenopathy   Lungs: Normal size and shape, normal breath sounds, equal distribution of air, no rales and rhonchi   CV: S1-S2 regular, no murmurs, no rub, no gallop   Abdomen: Soft, non-tender; no masses , no abnormal abdominal sounds   Extremities: No deformity , normal color , no peripheral edema   Skin: Normal temperature, turgor and texture; no rash, ulcers            ECG 12 Lead    Date/Time: 11/19/2020 2:33 PM  Performed by: Noreen Beauchamp MD  Authorized by: Noreen Beauchamp MD   Comparison: compared with previous ECG   Similar to previous ECG  Rhythm: sinus rhythm  ST Flattening: all    Clinical impression: non-specific ECG              Echocardiogram:        Current Outpatient Medications:   •  bisoprolol-hydrochlorothiazide (ZIAC) 5-6.25 MG per tablet, TAKE 1 TABLET BY MOUTH EVERY DAY, Disp: 90 tablet, Rfl: 0  •  busPIRone (BUSPAR) 7.5 MG tablet, TAKE 1 TABLET BY MOUTH TWICE A DAY, Disp: 60 tablet, Rfl: 5  •  FLUoxetine (PROzac) 20 MG capsule, Take 1 capsule by mouth Daily., Disp: 90 capsule, Rfl: 0  •  olmesartan-hydrochlorothiazide (BENICAR HCT) 20-12.5 MG per tablet, TAKE 1 " TABLET BY MOUTH EVERY DAY, Disp: 30 tablet, Rfl: 0   Assessment:        Patient Active Problem List   Diagnosis   • Mitral valve insufficiency   • Palpitations   • Essential hypertension   • Tobacco use   • Anxiety   • Mixed hyperlipidemia   • B12 deficiency   • Smoker unmotivated to quit   • IUD (intrauterine device) in place               Plan:            ICD-10-CM ICD-9-CM   1. Palpitations  R00.2 785.1   2. Essential hypertension  I10 401.9   3. Mitral valve insufficiency, unspecified etiology  I34.0 424.0   4. Mixed hyperlipidemia  E78.2 272.2   5. Tobacco use  Z72.0 305.1     1. Palpitations  Palpitations under control    2. Essential hypertension  Blood pressure under control    3. Mitral valve insufficiency, unspecified etiology  Treat conservatively    4. Mixed hyperlipidemia  Continue current treatment    5. Tobacco use  Counseling done  ECHO    PHONE VISIT      COUNSELING:    Myriam Leyva was given to patient for the following topics: diagnostic results, risk factor reductions, impressions, risks and benefits of treatment options and importance of treatment compliance .       SMOKING COUNSELING:    [unfilled]    Dictated using Dragon dictation

## 2020-11-24 ENCOUNTER — OFFICE VISIT (OUTPATIENT)
Dept: FAMILY MEDICINE CLINIC | Facility: CLINIC | Age: 50
End: 2020-11-24

## 2020-11-24 VITALS
DIASTOLIC BLOOD PRESSURE: 62 MMHG | SYSTOLIC BLOOD PRESSURE: 112 MMHG | HEIGHT: 64 IN | HEART RATE: 81 BPM | WEIGHT: 164 LBS | BODY MASS INDEX: 28 KG/M2 | OXYGEN SATURATION: 98 %

## 2020-11-24 DIAGNOSIS — Z01.419 WELL WOMAN EXAM: Primary | ICD-10-CM

## 2020-11-24 DIAGNOSIS — Z12.11 SCREEN FOR COLON CANCER: ICD-10-CM

## 2020-11-24 DIAGNOSIS — I10 ESSENTIAL HYPERTENSION: Primary | ICD-10-CM

## 2020-11-24 DIAGNOSIS — E78.2 MIXED HYPERLIPIDEMIA: ICD-10-CM

## 2020-11-24 DIAGNOSIS — Z12.31 ENCOUNTER FOR SCREENING MAMMOGRAM FOR BREAST CANCER: ICD-10-CM

## 2020-11-24 DIAGNOSIS — F41.9 ANXIETY: ICD-10-CM

## 2020-11-24 PROCEDURE — 99214 OFFICE O/P EST MOD 30 MIN: CPT | Performed by: FAMILY MEDICINE

## 2020-11-24 NOTE — PROGRESS NOTES
Subjective   Myriam Thomas is a 50 y.o. female. Presents today for   Chief Complaint   Patient presents with   • Anxiety   • Hypertension   • Hyperlipidemia       Anxiety  Presents for follow-up visit. Patient reports no chest pain or shortness of breath. Symptoms occur occasionally. The severity of symptoms is mild. The quality of sleep is fair. Nighttime awakenings: occasional.     Compliance with medications is %.   Hypertension  This is a chronic problem. The current episode started more than 1 year ago. The problem is unchanged. The problem is controlled. Associated symptoms include anxiety. Pertinent negatives include no chest pain or shortness of breath. There are no associated agents to hypertension. The current treatment provides moderate improvement. There are no compliance problems.  There is no history of kidney disease, CAD/MI, CVA, heart failure or PVD. There is no history of chronic renal disease.   Hyperlipidemia  This is a chronic problem. The current episode started more than 1 year ago. Condition status: hdl low, but ldl low too. Recent lipid tests were reviewed and are low. She has no history of chronic renal disease. Factors aggravating her hyperlipidemia include beta blockers. Pertinent negatives include no chest pain or shortness of breath. Current antihyperlipidemic treatment includes diet change. Risk factors for coronary artery disease include hypertension.   Possibly had covid 19 in July, fatigue only;  Daughter +;     Due for pap, prefers stay here.    Hx of colon cancer gma early to mid 70s;   Has not had colon cancer screening, d/w options and costs;  Will try FIT test  Due for mammogram  Review of Systems   Respiratory: Negative for shortness of breath.    Cardiovascular: Negative for chest pain.       Patient Active Problem List   Diagnosis   • Mitral valve insufficiency   • Palpitations   • Essential hypertension   • Tobacco use   • Anxiety   • Mixed hyperlipidemia   • B12  "deficiency   • Smoker unmotivated to quit   • IUD (intrauterine device) in place       Social History     Socioeconomic History   • Marital status: Legally      Spouse name: Roddy   • Number of children: 3   • Years of education: 12   • Highest education level: Not on file   Occupational History   • Occupation:      Employer: [employer]   Tobacco Use   • Smoking status: Current Every Day Smoker     Packs/day: 1.00     Years: 26.00     Pack years: 26.00     Types: Cigarettes     Start date: 1/1/1992   • Smokeless tobacco: Never Used   Substance and Sexual Activity   • Alcohol use: Yes     Comment: RARELY   • Drug use: No   • Sexual activity: Not Currently     Partners: Male     Birth control/protection: I.U.D.       Allergies   Allergen Reactions   • Hydrocodone-Acetaminophen Itching       Current Outpatient Medications on File Prior to Visit   Medication Sig Dispense Refill   • bisoprolol-hydrochlorothiazide (ZIAC) 5-6.25 MG per tablet TAKE 1 TABLET BY MOUTH EVERY DAY 90 tablet 0   • busPIRone (BUSPAR) 7.5 MG tablet TAKE 1 TABLET BY MOUTH TWICE A DAY 60 tablet 5   • FLUoxetine (PROzac) 20 MG capsule Take 1 capsule by mouth Daily. 90 capsule 0   • olmesartan-hydrochlorothiazide (BENICAR HCT) 20-12.5 MG per tablet TAKE 1 TABLET BY MOUTH EVERY DAY 30 tablet 0     No current facility-administered medications on file prior to visit.        Objective   Vitals:    11/24/20 0902   BP: 112/62   Pulse: 81   SpO2: 98%   Weight: 74.4 kg (164 lb)   Height: 162.6 cm (64\")     Body mass index is 28.15 kg/m².    Physical Exam  Vitals signs and nursing note reviewed.   Constitutional:       Appearance: She is well-developed.   HENT:      Head: Normocephalic and atraumatic.   Neck:      Musculoskeletal: Neck supple.      Thyroid: No thyromegaly.      Vascular: No JVD.   Cardiovascular:      Rate and Rhythm: Normal rate and regular rhythm.      Heart sounds: Normal heart sounds. No murmur. No " friction rub. No gallop.    Pulmonary:      Effort: Pulmonary effort is normal. No respiratory distress.      Breath sounds: Normal breath sounds. No wheezing or rales.   Abdominal:      General: Bowel sounds are normal. There is no distension.      Palpations: Abdomen is soft.      Tenderness: There is no abdominal tenderness. There is no guarding or rebound.   Skin:     General: Skin is warm and dry.   Neurological:      Mental Status: She is alert.   Psychiatric:         Behavior: Behavior normal.         Assessment/Plan   Diagnoses and all orders for this visit:    1. Essential hypertension (Primary)    2. Mixed hyperlipidemia    3. Anxiety    4. Encounter for screening mammogram for breast cancer  -     Mammo Screening Bilateral With CAD; Future    5. Screen for colon cancer  -     Occult Blood X 1, Stool - Stool, Per Rectum; Future    mood stable, continue medications  Lipids recheck at wellness visit in 6 months  Pap in 6 months  FIT test for colon cancer screen annually  -hypertension - controlled, continue medications  Due for mammogram  Has IUD, replacement not due fo r4 years       -Follow up: 6 months and prn

## 2020-12-04 ENCOUNTER — TELEPHONE (OUTPATIENT)
Dept: OBSTETRICS AND GYNECOLOGY | Facility: CLINIC | Age: 50
End: 2020-12-04

## 2020-12-04 NOTE — TELEPHONE ENCOUNTER
L/m for pt to call to schedule NP appt for annual exam.  Referred by Dr. Lizandro mario/Joanna.     Pt # [phone]

## 2020-12-21 ENCOUNTER — HOSPITAL ENCOUNTER (OUTPATIENT)
Dept: CARDIOLOGY | Facility: HOSPITAL | Age: 50
Discharge: HOME | End: 2020-12-21
Admitting: INTERNAL MEDICINE

## 2020-12-21 ENCOUNTER — OFFICE VISIT (OUTPATIENT)
Dept: CARDIOLOGY | Facility: CLINIC | Age: 50
End: 2020-12-21

## 2020-12-21 VITALS
SYSTOLIC BLOOD PRESSURE: 141 MMHG | WEIGHT: 164 LBS | HEIGHT: 64 IN | HEART RATE: 64 BPM | BODY MASS INDEX: 28 KG/M2 | DIASTOLIC BLOOD PRESSURE: 92 MMHG

## 2020-12-21 VITALS
BODY MASS INDEX: 27.66 KG/M2 | DIASTOLIC BLOOD PRESSURE: 98 MMHG | HEART RATE: 56 BPM | SYSTOLIC BLOOD PRESSURE: 153 MMHG | WEIGHT: 162 LBS | HEIGHT: 64 IN

## 2020-12-21 DIAGNOSIS — I10 ESSENTIAL HYPERTENSION: Primary | ICD-10-CM

## 2020-12-21 DIAGNOSIS — R00.2 PALPITATIONS: ICD-10-CM

## 2020-12-21 DIAGNOSIS — E78.2 MIXED HYPERLIPIDEMIA: ICD-10-CM

## 2020-12-21 LAB
AORTIC DIMENSIONLESS INDEX: 0.7 (DI)
BH CV ECHO MEAS - ACS: 2.3 CM
BH CV ECHO MEAS - AO MAX PG (FULL): 4.1 MMHG
BH CV ECHO MEAS - AO MAX PG: 7.7 MMHG
BH CV ECHO MEAS - AO MEAN PG (FULL): 2 MMHG
BH CV ECHO MEAS - AO MEAN PG: 3.8 MMHG
BH CV ECHO MEAS - AO ROOT AREA (BSA CORRECTED): 1.7
BH CV ECHO MEAS - AO ROOT AREA: 7.4 CM^2
BH CV ECHO MEAS - AO ROOT DIAM: 3.1 CM
BH CV ECHO MEAS - AO V2 MAX: 138.7 CM/SEC
BH CV ECHO MEAS - AO V2 MEAN: 89.3 CM/SEC
BH CV ECHO MEAS - AO V2 VTI: 29 CM
BH CV ECHO MEAS - ASC AORTA: 2.9 CM
BH CV ECHO MEAS - AVA(I,A): 2.4 CM^2
BH CV ECHO MEAS - AVA(I,D): 2.4 CM^2
BH CV ECHO MEAS - AVA(V,A): 2.4 CM^2
BH CV ECHO MEAS - AVA(V,D): 2.4 CM^2
BH CV ECHO MEAS - BSA(HAYCOCK): 1.9 M^2
BH CV ECHO MEAS - BSA: 1.8 M^2
BH CV ECHO MEAS - BZI_BMI: 28.2 KILOGRAMS/M^2
BH CV ECHO MEAS - BZI_METRIC_HEIGHT: 162.6 CM
BH CV ECHO MEAS - BZI_METRIC_WEIGHT: 74.4 KG
BH CV ECHO MEAS - EDV(CUBED): 79.5 ML
BH CV ECHO MEAS - EDV(MOD-SP2): 69 ML
BH CV ECHO MEAS - EDV(MOD-SP4): 68 ML
BH CV ECHO MEAS - EDV(TEICH): 83 ML
BH CV ECHO MEAS - EF(CUBED): 77 %
BH CV ECHO MEAS - EF(MOD-BP): 66.4 %
BH CV ECHO MEAS - EF(MOD-SP2): 63.8 %
BH CV ECHO MEAS - EF(MOD-SP4): 61.8 %
BH CV ECHO MEAS - EF(TEICH): 69.4 %
BH CV ECHO MEAS - ESV(CUBED): 18.3 ML
BH CV ECHO MEAS - ESV(MOD-SP2): 25 ML
BH CV ECHO MEAS - ESV(MOD-SP4): 26 ML
BH CV ECHO MEAS - ESV(TEICH): 25.4 ML
BH CV ECHO MEAS - FS: 38.7 %
BH CV ECHO MEAS - IVS/LVPW: 1.3
BH CV ECHO MEAS - IVSD: 0.95 CM
BH CV ECHO MEAS - LAT PEAK E' VEL: 11.7 CM/SEC
BH CV ECHO MEAS - LV DIASTOLIC VOL/BSA (35-75): 37.8 ML/M^2
BH CV ECHO MEAS - LV MASS(C)D: 114.6 GRAMS
BH CV ECHO MEAS - LV MASS(C)DI: 63.7 GRAMS/M^2
BH CV ECHO MEAS - LV MAX PG: 3.6 MMHG
BH CV ECHO MEAS - LV MEAN PG: 1.8 MMHG
BH CV ECHO MEAS - LV SYSTOLIC VOL/BSA (12-30): 14.5 ML/M^2
BH CV ECHO MEAS - LV V1 MAX: 95.1 CM/SEC
BH CV ECHO MEAS - LV V1 MEAN: 61.7 CM/SEC
BH CV ECHO MEAS - LV V1 VTI: 20.2 CM
BH CV ECHO MEAS - LVIDD: 4.3 CM
BH CV ECHO MEAS - LVIDS: 2.6 CM
BH CV ECHO MEAS - LVLD AP2: 7.5 CM
BH CV ECHO MEAS - LVLD AP4: 6.3 CM
BH CV ECHO MEAS - LVLS AP2: 5.4 CM
BH CV ECHO MEAS - LVLS AP4: 5.3 CM
BH CV ECHO MEAS - LVOT AREA (M): 3.5 CM^2
BH CV ECHO MEAS - LVOT AREA: 3.4 CM^2
BH CV ECHO MEAS - LVOT DIAM: 2.1 CM
BH CV ECHO MEAS - LVPWD: 0.75 CM
BH CV ECHO MEAS - MED PEAK E' VEL: 9.2 CM/SEC
BH CV ECHO MEAS - MR MAX PG: 47.7 MMHG
BH CV ECHO MEAS - MR MAX VEL: 345.3 CM/SEC
BH CV ECHO MEAS - MV A DUR: 0.12 SEC
BH CV ECHO MEAS - MV A MAX VEL: 88.3 CM/SEC
BH CV ECHO MEAS - MV DEC SLOPE: 235.2 CM/SEC^2
BH CV ECHO MEAS - MV DEC TIME: 164 SEC
BH CV ECHO MEAS - MV E MAX VEL: 80 CM/SEC
BH CV ECHO MEAS - MV E/A: 0.91
BH CV ECHO MEAS - MV MAX PG: 4 MMHG
BH CV ECHO MEAS - MV MEAN PG: 1.7 MMHG
BH CV ECHO MEAS - MV P1/2T MAX VEL: 103.7 CM/SEC
BH CV ECHO MEAS - MV P1/2T: 129.2 MSEC
BH CV ECHO MEAS - MV V2 MAX: 99.9 CM/SEC
BH CV ECHO MEAS - MV V2 MEAN: 61.5 CM/SEC
BH CV ECHO MEAS - MV V2 VTI: 36 CM
BH CV ECHO MEAS - MVA P1/2T LCG: 2.1 CM^2
BH CV ECHO MEAS - MVA(P1/2T): 1.7 CM^2
BH CV ECHO MEAS - MVA(VTI): 1.9 CM^2
BH CV ECHO MEAS - PA ACC TIME: 0.13 SEC
BH CV ECHO MEAS - PA MAX PG (FULL): 0.89 MMHG
BH CV ECHO MEAS - PA MAX PG: 2.9 MMHG
BH CV ECHO MEAS - PA PR(ACCEL): 22 MMHG
BH CV ECHO MEAS - PA V2 MAX: 85.5 CM/SEC
BH CV ECHO MEAS - PI END-D VEL: 70.6 CM/SEC
BH CV ECHO MEAS - PULM A REVS DUR: 0.14 SEC
BH CV ECHO MEAS - PULM A REVS VEL: 32 CM/SEC
BH CV ECHO MEAS - PULM DIAS VEL: 41.7 CM/SEC
BH CV ECHO MEAS - PULM S/D: 1.4
BH CV ECHO MEAS - PULM SYS VEL: 59.2 CM/SEC
BH CV ECHO MEAS - RAP SYSTOLE: 3 MMHG
BH CV ECHO MEAS - RV MAX PG: 2 MMHG
BH CV ECHO MEAS - RV MEAN PG: 1.3 MMHG
BH CV ECHO MEAS - RV V1 MAX: 71.3 CM/SEC
BH CV ECHO MEAS - RV V1 MEAN: 53.8 CM/SEC
BH CV ECHO MEAS - RV V1 VTI: 17.4 CM
BH CV ECHO MEAS - RVSP: 22 MMHG
BH CV ECHO MEAS - SI(AO): 120.1 ML/M^2
BH CV ECHO MEAS - SI(CUBED): 34 ML/M^2
BH CV ECHO MEAS - SI(LVOT): 38.5 ML/M^2
BH CV ECHO MEAS - SI(MOD-SP2): 24.5 ML/M^2
BH CV ECHO MEAS - SI(MOD-SP4): 23.4 ML/M^2
BH CV ECHO MEAS - SI(TEICH): 32 ML/M^2
BH CV ECHO MEAS - SV(AO): 216 ML
BH CV ECHO MEAS - SV(CUBED): 61.2 ML
BH CV ECHO MEAS - SV(LVOT): 69.3 ML
BH CV ECHO MEAS - SV(MOD-SP2): 44 ML
BH CV ECHO MEAS - SV(MOD-SP4): 42 ML
BH CV ECHO MEAS - SV(TEICH): 57.6 ML
BH CV ECHO MEAS - TR MAX PG: 19 MMHG
BH CV ECHO MEAS - TR MAX VEL: 218.4 CM/SEC
BH CV ECHO MEASUREMENTS AVERAGE E/E' RATIO: 7.66
BH CV XLRA - RV BASE: 3.9 CM
BH CV XLRA - RV LENGTH: 4.6 CM
BH CV XLRA - RV MID: 3.4 CM
BH CV XLRA - TDI S': 12 CM/SEC
LEFT ATRIUM VOLUME INDEX: 23.8 ML/M2
MAXIMAL PREDICTED HEART RATE: 172 BPM
STRESS TARGET HR: 146 BPM

## 2020-12-21 PROCEDURE — 93306 TTE W/DOPPLER COMPLETE: CPT | Performed by: INTERNAL MEDICINE

## 2020-12-21 PROCEDURE — 93306 TTE W/DOPPLER COMPLETE: CPT

## 2020-12-21 PROCEDURE — 99213 OFFICE O/P EST LOW 20 MIN: CPT | Performed by: INTERNAL MEDICINE

## 2020-12-21 NOTE — PROGRESS NOTES
1 month follow up    Subjective:        Myriam Thomas is a 50 y.o. female who here for follow up    CC  Follow-up hypertension hyperlipidemia  HPI  50-year-old female with a history of the palpitation benign essential arterial hypertension as well as hyperlipidemia here for the follow-up with no complaints of chest pain tightness heaviness or the pressure sensation     Problems Addressed this Visit        Cardiovascular and Mediastinum    Palpitations    Essential hypertension - Primary    Mixed hyperlipidemia      Diagnoses       Codes Comments    Essential hypertension    -  Primary ICD-10-CM: I10  ICD-9-CM: 401.9     Palpitations     ICD-10-CM: R00.2  ICD-9-CM: 785.1     Mixed hyperlipidemia     ICD-10-CM: E78.2  ICD-9-CM: 272.2         .    The following portions of the patient's history were reviewed and updated as appropriate: allergies, current medications, past family history, past medical history, past social history, past surgical history and problem list.    Past Medical History:   Diagnosis Date   • Anxiety    • Depression    • HTN (hypertension)    • Hyperlipidemia    • Mitral regurgitation    • Palpitations    • Renal calculi      reports that she has been smoking cigarettes. She started smoking about 28 years ago. She has a 26.00 pack-year smoking history. She has never used smokeless tobacco. She reports current alcohol use. She reports that she does not use drugs.   Family History   Problem Relation Age of Onset   • Hypertension Mother    • Heart attack Mother    • Diabetes Mother    • Heart disease Father         unsure full hx.   • Cancer Brother    • Breast cancer Maternal Grandmother         50s recurrence in her late 60s   • Colon cancer Maternal Grandmother         70s y/o   • Hypertension Maternal Grandmother    • Alzheimer's disease Maternal Grandmother    • Hypertension Maternal Grandfather    • Emphysema Maternal Grandfather    • Diabetes Paternal Grandmother    • No Known Problems Paternal  "Grandfather        Review of Systems  Constitutional: No wt loss, fever, fatigue  Gastrointestinal: No nausea, abdominal pain  Behavioral/Psych: No insomnia or anxiety   Cardiovascular no chest pains or tightness in the chest  Objective:       Physical Exam  /98 (BP Location: Right arm)   Pulse 56   Ht 162.6 cm (64\")   Wt 73.5 kg (162 lb)   BMI 27.81 kg/m²   General appearance: No acute changes   Neck: Trachea midline; NECK, supple, no thyromegaly or lymphadenopathy   Lungs: Normal size and shape, normal breath sounds, equal distribution of air, no rales and rhonchi   CV: S1-S2 regular, no murmurs, no rub, no gallop   Abdomen: Soft, non-tender; no masses , no abnormal abdominal sounds   Extremities: No deformity , normal color , no peripheral edema   Skin: Normal temperature, turgor and texture; no rash, ulcers          Procedures      Echocardiogram:        Current Outpatient Medications:   •  bisoprolol-hydrochlorothiazide (ZIAC) 5-6.25 MG per tablet, TAKE 1 TABLET BY MOUTH EVERY DAY, Disp: 90 tablet, Rfl: 0  •  busPIRone (BUSPAR) 7.5 MG tablet, TAKE 1 TABLET BY MOUTH TWICE A DAY, Disp: 60 tablet, Rfl: 5  •  FLUoxetine (PROzac) 20 MG capsule, Take 1 capsule by mouth Daily., Disp: 90 capsule, Rfl: 0  •  olmesartan-hydrochlorothiazide (BENICAR HCT) 20-12.5 MG per tablet, TAKE 1 TABLET BY MOUTH EVERY DAY, Disp: 30 tablet, Rfl: 0   Assessment:        Patient Active Problem List   Diagnosis   • Mitral valve insufficiency   • Palpitations   • Essential hypertension   • Tobacco use   • Anxiety   • Mixed hyperlipidemia   • B12 deficiency   • Smoker unmotivated to quit   • IUD (intrauterine device) in place               Plan:            ICD-10-CM ICD-9-CM   1. Essential hypertension  I10 401.9   2. Palpitations  R00.2 785.1   3. Mixed hyperlipidemia  E78.2 272.2     1. Essential hypertension  Blood pressure under control    2. Palpitations  Under control*    3. Mixed hyperlipidemia  Continue current treatment   "     CV STABLE    SEE IN 1 YR  COUNSELING:    Myriam Leyva was given to patient for the following topics: diagnostic results, risk factor reductions, impressions, risks and benefits of treatment options and importance of treatment compliance .       SMOKING COUNSELING:    [unfilled]    Dictated using Dragon dictation

## 2021-02-01 ENCOUNTER — TELEPHONE (OUTPATIENT)
Dept: FAMILY MEDICINE CLINIC | Facility: CLINIC | Age: 51
End: 2021-02-01

## 2021-02-01 DIAGNOSIS — F41.9 ANXIETY: ICD-10-CM

## 2021-02-01 RX ORDER — AMOXICILLIN AND CLAVULANATE POTASSIUM 875; 125 MG/1; MG/1
1 TABLET, FILM COATED ORAL 2 TIMES DAILY
Qty: 20 TABLET | Refills: 0 | Status: SHIPPED | OUTPATIENT
Start: 2021-02-01 | End: 2021-05-25

## 2021-02-01 RX ORDER — FLUOXETINE HYDROCHLORIDE 20 MG/1
CAPSULE ORAL
Qty: 90 CAPSULE | Refills: 3 | Status: SHIPPED | OUTPATIENT
Start: 2021-02-01 | End: 2021-07-26 | Stop reason: SDUPTHER

## 2021-02-01 NOTE — TELEPHONE ENCOUNTER
Caller: Myriam Thomas    Relationship: Self    Best call back number: [phone]    What medication are you requesting: ANTIBIOTIC FOR INFECTION    What are your current symptoms: INFECTED TOOTH, SWELLING IN JAW AND TENDERNESS TO THE TOUCH, A LITTLE BIT HOT TO THE TOUCH    How long have you been experiencing symptoms: ABOUT A WEEK AND A HALF    If a prescription is needed, what is your preferred pharmacy and phone number: [facility] #[id number] - [place], [place] - [address] [facility] [phone] [facility] [phone] FX     Additional notes: HAS BEEN TAKING IBUPROFEN, HASN'T HELPED WITH ANYTHING BUT PAIN. PT KNOWS SHE WILL NEED TO HAVE THE TOOTH PULLED, BUT THEY WILL NOT PULL WITH INFECTION. SHE IS WILLING TO BE SEEN IF NECESSARY

## 2021-02-02 RX ORDER — BISOPROLOL FUMARATE AND HYDROCHLOROTHIAZIDE 5; 6.25 MG/1; MG/1
TABLET ORAL
Qty: 90 TABLET | Refills: 1 | Status: SHIPPED | OUTPATIENT
Start: 2021-02-02 | End: 2021-05-25

## 2021-02-04 RX ORDER — OLMESARTAN MEDOXOMIL AND HYDROCHLOROTHIAZIDE 20/12.5 20; 12.5 MG/1; MG/1
TABLET ORAL
Qty: 30 TABLET | Refills: 5 | Status: SHIPPED | OUTPATIENT
Start: 2021-02-04 | End: 2021-07-26 | Stop reason: SDUPTHER

## 2021-04-02 ENCOUNTER — BULK ORDERING (OUTPATIENT)
Dept: CASE MANAGEMENT | Facility: OTHER | Age: 51
End: 2021-04-02

## 2021-04-02 DIAGNOSIS — Z23 IMMUNIZATION DUE: ICD-10-CM

## 2021-04-06 ENCOUNTER — TELEPHONE (OUTPATIENT)
Dept: FAMILY MEDICINE CLINIC | Facility: CLINIC | Age: 51
End: 2021-04-06

## 2021-04-06 DIAGNOSIS — Z12.31 ENCOUNTER FOR SCREENING MAMMOGRAM FOR MALIGNANT NEOPLASM OF BREAST: Primary | ICD-10-CM

## 2021-04-06 NOTE — TELEPHONE ENCOUNTER
Caller: Myriam Thomas    Relationship: Self    Best call back number:[phone]     What orders are you requesting (i.e. lab or imaging): MAMMOGRAM   In what timeframe would the patient need to come in: ASAP    Where will you receive your lab/imaging services: PATIENT WOULD LIKE TO HAVE THE IMAGING DONE WITH ANOTHER FACILITY OTHER  THAN [facility]. PATIENT HAS BEEN CALLING TO MAKE AN APPOINTMENT AND NO ONE ANSWERS     Additional notes: PLEASE CALL PATIENT AND INFORM

## 2021-04-08 ENCOUNTER — TRANSCRIBE ORDERS (OUTPATIENT)
Dept: ADMINISTRATIVE | Facility: HOSPITAL | Age: 51
End: 2021-04-08

## 2021-04-08 DIAGNOSIS — Z12.31 ENCOUNTER FOR SCREENING MAMMOGRAM FOR MALIGNANT NEOPLASM OF BREAST: Primary | ICD-10-CM

## 2021-04-13 ENCOUNTER — HOSPITAL ENCOUNTER (OUTPATIENT)
Dept: MAMMOGRAPHY | Facility: HOSPITAL | Age: 51
Discharge: HOME | End: 2021-04-13
Admitting: FAMILY MEDICINE

## 2021-04-13 DIAGNOSIS — Z12.31 ENCOUNTER FOR SCREENING MAMMOGRAM FOR MALIGNANT NEOPLASM OF BREAST: ICD-10-CM

## 2021-04-13 PROCEDURE — 77063 BREAST TOMOSYNTHESIS BI: CPT

## 2021-04-13 PROCEDURE — 77067 SCR MAMMO BI INCL CAD: CPT

## 2021-04-14 ENCOUNTER — OFFICE VISIT (OUTPATIENT)
Dept: OBSTETRICS AND GYNECOLOGY | Facility: CLINIC | Age: 51
End: 2021-04-14

## 2021-04-14 VITALS
WEIGHT: 162 LBS | HEIGHT: 64 IN | SYSTOLIC BLOOD PRESSURE: 140 MMHG | DIASTOLIC BLOOD PRESSURE: 96 MMHG | BODY MASS INDEX: 27.66 KG/M2

## 2021-04-14 DIAGNOSIS — Z12.39 ENCOUNTER FOR BREAST CANCER SCREENING USING NON-MAMMOGRAM MODALITY: ICD-10-CM

## 2021-04-14 DIAGNOSIS — Z01.411 ENCOUNTER FOR GYNECOLOGICAL EXAMINATION WITH ABNORMAL FINDING: Primary | ICD-10-CM

## 2021-04-14 DIAGNOSIS — R19.5 HEME POSITIVE STOOL: ICD-10-CM

## 2021-04-14 LAB
DEVELOPER EXPIRATION DATE: ABNORMAL
DEVELOPER LOT NUMBER: ABNORMAL
EXPIRATION DATE: ABNORMAL
FECAL OCCULT BLOOD SCREEN, POC: POSITIVE
Lab: ABNORMAL
NEGATIVE CONTROL: NEGATIVE
POSITIVE CONTROL: POSITIVE

## 2021-04-14 PROCEDURE — 99396 PREV VISIT EST AGE 40-64: CPT | Performed by: OBSTETRICS & GYNECOLOGY

## 2021-04-14 PROCEDURE — G0328 FECAL BLOOD SCRN IMMUNOASSAY: HCPCS | Performed by: OBSTETRICS & GYNECOLOGY

## 2021-04-14 NOTE — PROGRESS NOTES
Subjective    Chief Complaint   Patient presents with   • Gynecologic Exam     AE      History of Present Illness    Myriam Thomas is a 50 y.o. female who presents for annual exam.  Patient smokes 3/4 packs/day and we discussed smoking cessation but not interested in stopping at this  exact time.  Patient had a mammogram yesterday which is pending at [facility].  Mirena inserted 2019 with just occasional breakthrough bleeding.    Obstetric History:  OB History        4    Para   4    Term   3       1    AB        Living   3       SAB        TAB        Ectopic        Molar        Multiple        Live Births   3               Menstrual History:     No LMP recorded. Patient has had an implant.       Past Medical History:   Diagnosis Date   • Anxiety    • Depression    • HTN (hypertension)    • Hyperlipidemia    • Mitral regurgitation    • Palpitations    • Renal calculi      Family History   Problem Relation Age of Onset   • Hypertension Mother    • Heart attack Mother    • Diabetes Mother    • Heart disease Father         unsure full hx.   • Cancer Brother    • Breast cancer Maternal Grandmother         50s recurrence in her late 60s   • Colon cancer Maternal Grandmother         70s y/o   • Hypertension Maternal Grandmother    • Alzheimer's disease Maternal Grandmother    • Hypertension Maternal Grandfather    • Emphysema Maternal Grandfather    • Diabetes Paternal Grandmother    • No Known Problems Paternal Grandfather      Social History     Tobacco Use   Smoking Status Current Every Day Smoker   • Packs/day: 0.75   • Years: 26.00   • Pack years: 19.50   • Types: Cigarettes   • Start date: 1992   Smokeless Tobacco Never Used   Tobacco Comment    Has tried Chantix and Wellbutrin and not wanting to try anything else at this time.     Myriam Thomas  reports that she has been smoking cigarettes. She started smoking about 29 years ago. She has a 19.50 pack-year smoking history. She has  never used smokeless tobacco..     I advised her to quit and she is not willing to quit.               The following portions of the patient's history were reviewed and updated as appropriate: allergies, current medications, past family history, past medical history, past social history, past surgical history and problem list.    Review of Systems   Constitutional: Negative.  Negative for fever and unexpected weight change.   HENT: Negative.    Respiratory: Negative for shortness of breath and wheezing.    Cardiovascular: Negative for chest pain, palpitations and leg swelling.   Gastrointestinal: Negative for abdominal pain, anal bleeding and blood in stool.   Genitourinary: Negative for dysuria, pelvic pain, urgency, vaginal bleeding, vaginal discharge and vaginal pain.   Skin: Negative.    Neurological: Negative.    Hematological: Negative.  Negative for adenopathy.   Psychiatric/Behavioral: Negative.  Negative for dysphoric mood. The patient is not nervous/anxious.             Objective   Physical Exam  Exam conducted with a chaperone present.   Constitutional:       Appearance: Normal appearance. She is well-developed.   HENT:      Head: Normocephalic.   Neck:      Thyroid: No thyromegaly.      Trachea: Trachea normal. No tracheal deviation.   Cardiovascular:      Rate and Rhythm: Normal rate and regular rhythm.      Heart sounds: Normal heart sounds. No murmur heard.     Pulmonary:      Effort: Pulmonary effort is normal.      Breath sounds: Normal breath sounds.   Chest:      Breasts:         Right: Normal. No mass, nipple discharge or tenderness.         Left: Normal. No mass, nipple discharge or tenderness.   Abdominal:      Palpations: Abdomen is soft. There is no mass.      Tenderness: There is no abdominal tenderness.      Hernia: No hernia is present.   Genitourinary:     General: Normal vulva.      Labia:         Right: No tenderness or lesion.         Left: No tenderness or lesion.       Urethra: No  "prolapse or urethral lesion.      Vagina: Normal. No vaginal discharge or lesions.      Cervix: No cervical motion tenderness.      Uterus: Not enlarged and not tender.       Adnexa:         Right: No mass or tenderness.          Left: No mass or tenderness.        Rectum: Normal. Guaiac result positive. No external hemorrhoid or internal hemorrhoid. Normal anal tone.      Comments: External genitalia normal.  Mirena string easily seen in vagina coming from cervical os  Lymphadenopathy:      Cervical: No cervical adenopathy.      Upper Body:      Right upper body: No axillary adenopathy.      Left upper body: No axillary adenopathy.   Skin:     General: Skin is warm and dry.      Findings: No rash.   Neurological:      Mental Status: She is alert and oriented to person, place, and time.   Psychiatric:         Behavior: Behavior normal.         /96   Ht 162.6 cm (64\")   Wt 73.5 kg (162 lb)   BMI 27.81 kg/m²     Assessment/Plan   Diagnoses and all orders for this visit:    1. Encounter for gynecological examination with abnormal finding (Primary)  -     IGP,rfx Aptima HPV All Pth    2. Heme positive stool  -     POC Occult Blood Stool  -     Ambulatory Referral to Gastroenterology    3. Encounter for breast cancer screening using non-mammogram modality        Return to office 1 year.  Counseled about beginning vitamin D3 for osteoporosis prevention.  Counseled about colorectal screening and due to positive Hemoccult today GI referral being made.             "

## 2021-04-15 ENCOUNTER — TELEPHONE (OUTPATIENT)
Dept: GASTROENTEROLOGY | Facility: CLINIC | Age: 51
End: 2021-04-15

## 2021-04-20 LAB
CONV .: ABNORMAL
CYTOLOGIST CVX/VAG CYTO: ABNORMAL
CYTOLOGY CVX/VAG DOC CYTO: ABNORMAL
CYTOLOGY CVX/VAG DOC THIN PREP: ABNORMAL
DX ICD CODE: ABNORMAL
DX ICD CODE: ABNORMAL
HIV 1 & 2 AB SER-IMP: ABNORMAL
HPV I/H RISK 4 DNA CVX QL PROBE+SIG AMP: NEGATIVE
OTHER STN SPEC: ABNORMAL
PATHOLOGIST CVX/VAG CYTO: ABNORMAL
STAT OF ADQ CVX/VAG CYTO-IMP: ABNORMAL

## 2021-04-22 ENCOUNTER — TELEPHONE (OUTPATIENT)
Dept: OBSTETRICS AND GYNECOLOGY | Facility: CLINIC | Age: 51
End: 2021-04-22

## 2021-04-22 NOTE — TELEPHONE ENCOUNTER
Patient called she was following up on referral that was put in for Dr. Rosen office. She was wondering if anything was set up yet or if she could get a call back with the number to office to schedule an appointment. Thank you.

## 2021-04-22 NOTE — TELEPHONE ENCOUNTER
Please inform pt her referral was sent to [facility] for review.  They mailed her a questionnaire on 4/15/21.  She will need to complete that packet and return it to their office as soon as possible.  Once they review her packet they will call pt to schedule appt.  If she is anxious, she may call their office at [phone].

## 2021-04-22 NOTE — TELEPHONE ENCOUNTER
----- Message from Abdelrahman Banks MD sent at 4/21/2021 12:18 PM EDT -----  Please tell patient Pap ASCUS but negative HPV.  Just needs repeat in 1 year.  ZACHARIAH

## 2021-04-29 ENCOUNTER — TELEPHONE (OUTPATIENT)
Dept: GASTROENTEROLOGY | Facility: CLINIC | Age: 51
End: 2021-04-29

## 2021-04-29 NOTE — TELEPHONE ENCOUNTER
Patient asked about referral as not heard from scheduling yet on colonoscopy?  Original referral from Dr. Banks as hemoccult +.  Just checking on.   Dr. ARANGO    Screening colonoscopy-- no personal or family hx of polyps-- family hx of colon ca--no ASA or blood thinners-- medications:      bisoprolol-hydrochlorothiazide (ZIAC) 5-6.25 MG per tablet  busPIRone (BUSPAR) 7.5 MG tablet  FLUoxetine (PROzac) 20 MG capsule  olmesartan-hydrochlorothiazide (BENICAR HCT) 20-12.5 MG per tablet    OA form scanned into media

## 2021-05-25 ENCOUNTER — OFFICE VISIT (OUTPATIENT)
Dept: FAMILY MEDICINE CLINIC | Facility: CLINIC | Age: 51
End: 2021-05-25

## 2021-05-25 VITALS
BODY MASS INDEX: 28 KG/M2 | DIASTOLIC BLOOD PRESSURE: 70 MMHG | WEIGHT: 164 LBS | SYSTOLIC BLOOD PRESSURE: 100 MMHG | HEIGHT: 64 IN | HEART RATE: 83 BPM | OXYGEN SATURATION: 97 %

## 2021-05-25 DIAGNOSIS — M10.071 ACUTE IDIOPATHIC GOUT INVOLVING TOE OF RIGHT FOOT: ICD-10-CM

## 2021-05-25 DIAGNOSIS — Z13.1 DIABETES MELLITUS SCREENING: ICD-10-CM

## 2021-05-25 DIAGNOSIS — I10 ESSENTIAL HYPERTENSION: Primary | ICD-10-CM

## 2021-05-25 DIAGNOSIS — E78.2 MIXED HYPERLIPIDEMIA: ICD-10-CM

## 2021-05-25 DIAGNOSIS — F17.200 SMOKING: ICD-10-CM

## 2021-05-25 PROCEDURE — 99214 OFFICE O/P EST MOD 30 MIN: CPT | Performed by: FAMILY MEDICINE

## 2021-05-25 RX ORDER — NAPROXEN 500 MG/1
500 TABLET ORAL 2 TIMES DAILY
COMMUNITY
Start: 2021-05-24 | End: 2021-05-29

## 2021-05-25 RX ORDER — BISOPROLOL FUMARATE AND HYDROCHLOROTHIAZIDE 10; 6.25 MG/1; MG/1
1 TABLET ORAL DAILY
Qty: 90 TABLET | Refills: 3 | Status: SHIPPED | OUTPATIENT
Start: 2021-05-25 | End: 2021-07-26 | Stop reason: SDUPTHER

## 2021-05-25 RX ORDER — PREDNISONE 10 MG/1
TABLET ORAL
Qty: 32 TABLET | Refills: 0 | Status: SHIPPED | OUTPATIENT
Start: 2021-05-25 | End: 2021-07-26

## 2021-05-25 NOTE — PROGRESS NOTES
Subjective   Myriam Thomas is a 50 y.o. female. Presents today for   Chief Complaint   Patient presents with   • Hypertension   • Anxiety   • Depression   • Hyperlipidemia       Hypertension  This is a chronic problem. The current episode started more than 1 year ago. The problem is unchanged. The problem is uncontrolled (seen UC and was high as well). Associated symptoms include anxiety and headaches. Pertinent negatives include no chest pain, orthopnea, palpitations, peripheral edema, PND or shortness of breath. The current treatment provides moderate improvement.   Anxiety  Presents for follow-up visit. Symptoms include depressed mood, excessive worry and insomnia. Patient reports no chest pain, palpitations or shortness of breath. Symptoms occur occasionally.     Her past medical history is significant for depression. Compliance with medications is %.   Depression  Visit Type: follow-up  Patient presents with the following symptoms: depressed mood, excessive worry and insomnia.  Patient is not experiencing: palpitations and shortness of breath.  Compliance with medications:  %        Hyperlipidemia  This is a chronic problem. The problem is uncontrolled. Recent lipid tests were reviewed and are high. Factors aggravating her hyperlipidemia include smoking. Pertinent negatives include no chest pain or shortness of breath. Risk factors for coronary artery disease include hypertension.   Headache   This is a new problem. The current episode started more than 1 month ago. The problem occurs intermittently. The problem has been waxing and waning. The pain is located in the occipital region. The quality of the pain is described as pulsating and squeezing. The pain is severe. Associated symptoms include insomnia. Exacerbated by: waking up from sleep;  Bp was up when woke-up;  196/;  later that day 145/95. She has tried acetaminophen for the symptoms. The treatment provided mild relief. Her past medical  history is significant for migraine headaches.     Foot pain right MTP;  Red, swollen;  Told gout, not started NSAID yet since coming today.      Review of Systems   Respiratory: Negative for shortness of breath.    Cardiovascular: Negative for chest pain, palpitations, orthopnea and PND.   Neurological: Positive for headaches.   Psychiatric/Behavioral: The patient has insomnia.        Patient Active Problem List   Diagnosis   • Mitral valve insufficiency   • Palpitations   • Essential hypertension   • Tobacco use   • Anxiety   • Mixed hyperlipidemia   • B12 deficiency   • Smoker unmotivated to quit   • IUD (intrauterine device) in place       Social History     Socioeconomic History   • Marital status: Legally      Spouse name: Roddy   • Number of children: 3   • Years of education: 12   • Highest education level: Not on file   Tobacco Use   • Smoking status: Current Every Day Smoker     Packs/day: 0.75     Years: 26.00     Pack years: 19.50     Types: Cigarettes     Start date: 1/1/1992   • Smokeless tobacco: Never Used   • Tobacco comment: Has tried Chantix and Wellbutrin and not wanting to try anything else at this time.   Substance and Sexual Activity   • Alcohol use: Yes     Comment: RARELY   • Drug use: No   • Sexual activity: Not Currently     Partners: Male     Birth control/protection: I.U.D.       Allergies   Allergen Reactions   • Hydrocodone-Acetaminophen Itching       Current Outpatient Medications on File Prior to Visit   Medication Sig Dispense Refill   • busPIRone (BUSPAR) 7.5 MG tablet TAKE 1 TABLET BY MOUTH TWICE A DAY 60 tablet 5   • FLUoxetine (PROzac) 20 MG capsule TAKE 1 CAPSULE BY MOUTH EVERY DAY 90 capsule 3   • olmesartan-hydrochlorothiazide (BENICAR HCT) 20-12.5 MG per tablet TAKE 1 TABLET BY MOUTH EVERY DAY 30 tablet 5   • [DISCONTINUED] bisoprolol-hydrochlorothiazide (ZIAC) 5-6.25 MG per tablet TAKE 1 TABLET BY MOUTH EVERY DAY 90 tablet 1   • naproxen (NAPROSYN) 500 MG tablet  "Take 500 mg by mouth 2 (Two) Times a Day.     • [DISCONTINUED] amoxicillin-clavulanate (Augmentin) 875-125 MG per tablet Take 1 tablet by mouth 2 (Two) Times a Day. 20 tablet 0     No current facility-administered medications on file prior to visit.       Objective   Vitals:    21 0940   BP: 100/70   Pulse: 83   SpO2: 97%   Weight: 74.4 kg (164 lb)   Height: 162.6 cm (64\")     Body mass index is 28.15 kg/m².    Physical Exam  Vitals and nursing note reviewed.   Constitutional:       Appearance: Normal appearance. She is not toxic-appearing or diaphoretic.   HENT:      Head: Normocephalic and atraumatic.   Musculoskeletal:      Cervical back: Neck supple.      Right foot: Swelling (1st mtp faint redness and TTP) and tenderness present.   Skin:     General: Skin is warm and dry.      Capillary Refill: Capillary refill takes less than 2 seconds.   Neurological:      Mental Status: She is alert.   Psychiatric:         Mood and Affect: Mood normal.         Behavior: Behavior normal.       Narrative    REVIEWING YOUR TEST RESULTS IN [facility] IS NOT A SUBSTITUTE FOR DISCUSSING THOSE RESULTS WITH YOUR HEALTH CARE PROVIDER.   PLEASE CONTACT YOUR PROVIDER [facility] TO DISCUSS ANY QUESTIONS OR CONCERNS YOU MAY HAVE REGARDING THESE TEST RESULTS.     RADIOLOGY REPORT     FACILITY:  [facility]   UNIT/AGE/GENDER: [facility]HAWN[facility]  OP      AGE:50 Y          SEX:F   PATIENT NAME/:  YAHIR HINES L    1970   UNIT NUMBER:  [id number]   ACCOUNT NUMBER:  [id number]   ACCESSION NUMBER:  [id number]       Exam: 3 views right foot.     DATE:  2021     HISTORY:       right foot pain, initial encounter     FINDINGS: 3 views show no fracture or malalignment. The tarsal bones appear normal. The joint spaces are maintained. There are no osseous erosions.     IMPRESSION: Unremarkable right foot series.     Dictated by: Johnathan Mosqueda M.D.     Images and Report reviewed and interpreted by: Johnathan " VIDAL Mosqueda     <PS><Electronically signed by: Johnathan Mosqueda M.D.>   05/24/2021 1119     D: 05/24/2021 1118   T: 05/24/2021 1118    Assessment/Plan   Diagnoses and all orders for this visit:    1. Essential hypertension (Primary)  -     bisoprolol-hydrochlorothiazide (Ziac) 10-6.25 MG per tablet; Take 1 tablet by mouth Daily.  Dispense: 90 tablet; Refill: 3  -     Comprehensive Metabolic Panel    2. Mixed hyperlipidemia  -     Comprehensive Metabolic Panel  -     Lipid Panel    3. Smoking  -     Nicotine & Metabolite, Quant    4. Acute idiopathic gout involving toe of right foot  -     Uric Acid  -     predniSONE (DELTASONE) 10 MG tablet; 6 tabs po qd x 2 days, 4 tabs po qd x 2 days, 3 tabs po qd x 2 days, 2 tabs po qd x 2 days, 1 tab po qd x 2 days  Dispense: 32 tablet; Refill: 0    5. Diabetes mellitus screening  -     Hemoglobin A1c    -bp ok today  But spikign with headaches - will increase bisoprolol form 5/6.25 to 10/6.25mg po daily  -gout - prednisone taper for nwo;  Avoid nsaids  -d/w hctz may need to remove or cut back, check uric acid level  -work requests nicotine, lipid and a1c screening fo rinsurance  -recommend quit smoking         -Follow up:  8 weeks and prn

## 2021-05-31 ENCOUNTER — PREP FOR SURGERY (OUTPATIENT)
Dept: OTHER | Facility: HOSPITAL | Age: 51
End: 2021-05-31

## 2021-05-31 DIAGNOSIS — R19.5 HEME POSITIVE STOOL: Primary | ICD-10-CM

## 2021-06-01 LAB
ALBUMIN SERPL-MCNC: 4.5 G/DL (ref 3.5–5.2)
ALBUMIN/GLOB SERPL: 2.3 G/DL
ALP SERPL-CCNC: 71 U/L (ref 39–117)
ALT SERPL-CCNC: 18 U/L (ref 1–33)
AST SERPL-CCNC: 15 U/L (ref 1–32)
BILIRUB SERPL-MCNC: 0.4 MG/DL (ref 0–1.2)
BUN SERPL-MCNC: 7 MG/DL (ref 6–20)
BUN/CREAT SERPL: 8.8 (ref 7–25)
CALCIUM SERPL-MCNC: 9.6 MG/DL (ref 8.6–10.5)
CHLORIDE SERPL-SCNC: 100 MMOL/L (ref 98–107)
CHOLEST SERPL-MCNC: 167 MG/DL (ref 0–200)
CO2 SERPL-SCNC: 26.1 MMOL/L (ref 22–29)
COTININE SERPL-MCNC: 189 NG/ML
CREAT SERPL-MCNC: 0.8 MG/DL (ref 0.57–1)
GLOBULIN SER CALC-MCNC: 2 GM/DL
GLUCOSE SERPL-MCNC: 84 MG/DL (ref 65–99)
HBA1C MFR BLD: 5.3 % (ref 4.8–5.6)
HDLC SERPL-MCNC: 28 MG/DL (ref 40–60)
LDLC SERPL CALC-MCNC: 92 MG/DL (ref 0–100)
NICOTINE SERPL-MCNC: 13.5 NG/ML
POTASSIUM SERPL-SCNC: 4.4 MMOL/L (ref 3.5–5.2)
PROT SERPL-MCNC: 6.5 G/DL (ref 6–8.5)
SODIUM SERPL-SCNC: 136 MMOL/L (ref 136–145)
TRIGL SERPL-MCNC: 277 MG/DL (ref 0–150)
URATE SERPL-MCNC: 4 MG/DL (ref 2.4–5.7)
VLDLC SERPL CALC-MCNC: 47 MG/DL (ref 5–40)

## 2021-06-30 ENCOUNTER — TELEPHONE (OUTPATIENT)
Dept: GASTROENTEROLOGY | Facility: CLINIC | Age: 51
End: 2021-06-30

## 2021-06-30 ENCOUNTER — HOSPITAL ENCOUNTER (OUTPATIENT)
Facility: HOSPITAL | Age: 51
Setting detail: HOSPITAL OUTPATIENT SURGERY
End: 2021-06-30
Attending: INTERNAL MEDICINE | Admitting: INTERNAL MEDICINE

## 2021-06-30 PROBLEM — R19.5 HEME POSITIVE STOOL: Status: ACTIVE | Noted: 2021-06-30

## 2021-07-16 DIAGNOSIS — F41.9 ANXIETY: ICD-10-CM

## 2021-07-16 RX ORDER — BUSPIRONE HYDROCHLORIDE 7.5 MG/1
7.5 TABLET ORAL 2 TIMES DAILY
Qty: 180 TABLET | Refills: 1 | Status: SHIPPED | OUTPATIENT
Start: 2021-07-16 | End: 2021-07-26

## 2021-07-26 ENCOUNTER — OFFICE VISIT (OUTPATIENT)
Dept: FAMILY MEDICINE CLINIC | Facility: CLINIC | Age: 51
End: 2021-07-26

## 2021-07-26 VITALS
HEIGHT: 64 IN | DIASTOLIC BLOOD PRESSURE: 64 MMHG | SYSTOLIC BLOOD PRESSURE: 112 MMHG | BODY MASS INDEX: 28.68 KG/M2 | OXYGEN SATURATION: 98 % | HEART RATE: 93 BPM | WEIGHT: 168 LBS

## 2021-07-26 DIAGNOSIS — F41.1 GAD (GENERALIZED ANXIETY DISORDER): ICD-10-CM

## 2021-07-26 DIAGNOSIS — I10 ESSENTIAL HYPERTENSION: ICD-10-CM

## 2021-07-26 DIAGNOSIS — Z00.00 WELLNESS EXAMINATION: Primary | ICD-10-CM

## 2021-07-26 DIAGNOSIS — E78.2 MIXED HYPERLIPIDEMIA: ICD-10-CM

## 2021-07-26 PROCEDURE — 99396 PREV VISIT EST AGE 40-64: CPT | Performed by: FAMILY MEDICINE

## 2021-07-26 RX ORDER — BUSPIRONE HYDROCHLORIDE 10 MG/1
10 TABLET ORAL 2 TIMES DAILY
Qty: 180 TABLET | Refills: 3 | Status: SHIPPED | OUTPATIENT
Start: 2021-07-26 | End: 2022-11-14

## 2021-07-26 RX ORDER — FLUOXETINE HYDROCHLORIDE 20 MG/1
20 CAPSULE ORAL DAILY
Qty: 90 CAPSULE | Refills: 3 | Status: SHIPPED | OUTPATIENT
Start: 2021-07-26 | End: 2022-09-02

## 2021-07-26 RX ORDER — OLMESARTAN MEDOXOMIL AND HYDROCHLOROTHIAZIDE 20/12.5 20; 12.5 MG/1; MG/1
1 TABLET ORAL DAILY
Qty: 90 TABLET | Refills: 3 | Status: SHIPPED | OUTPATIENT
Start: 2021-07-26 | End: 2022-09-02

## 2021-07-26 RX ORDER — BISOPROLOL FUMARATE AND HYDROCHLOROTHIAZIDE 10; 6.25 MG/1; MG/1
1 TABLET ORAL DAILY
Qty: 90 TABLET | Refills: 3 | Status: SHIPPED | OUTPATIENT
Start: 2021-07-26 | End: 2022-09-02

## 2021-07-26 NOTE — PROGRESS NOTES
Subjective   Myriam Thomas is a 50 y.o. female. Presents today for   Chief Complaint   Patient presents with   • Hypertension   • Anxiety   • Annual Exam     wellness   • hep c screening       History of Present Illness  Patient here for wellness exam;  Doing well;  htn controlled; mary controlled but buspar 7.5mg not well covered;  meds expensive on insurance.  Has not had covid 19 vaccine  Having ocly burning back of leg;  Ok try b12  Review of Systems   Respiratory: Negative for shortness of breath.    Cardiovascular: Negative for chest pain, palpitations and leg swelling.       Patient Active Problem List   Diagnosis   • Mitral valve insufficiency   • Palpitations   • Essential hypertension   • Tobacco use   • Anxiety   • Mixed hyperlipidemia   • B12 deficiency   • Smoker unmotivated to quit   • IUD (intrauterine device) in place   • Heme positive stool       Social History     Socioeconomic History   • Marital status: Legally      Spouse name: Roddy   • Number of children: 3   • Years of education: 12   • Highest education level: Not on file   Tobacco Use   • Smoking status: Current Every Day Smoker     Packs/day: 0.75     Years: 26.00     Pack years: 19.50     Types: Cigarettes     Start date: 1/1/1992   • Smokeless tobacco: Never Used   • Tobacco comment: Has tried Chantix and Wellbutrin and not wanting to try anything else at this time.   Substance and Sexual Activity   • Alcohol use: Yes     Comment: RARELY   • Drug use: No   • Sexual activity: Not Currently     Partners: Male     Birth control/protection: I.U.D.       Allergies   Allergen Reactions   • Hydrocodone-Acetaminophen Itching       Current Outpatient Medications on File Prior to Visit   Medication Sig Dispense Refill   • [DISCONTINUED] bisoprolol-hydrochlorothiazide (Ziac) 10-6.25 MG per tablet Take 1 tablet by mouth Daily. 90 tablet 3   • [DISCONTINUED] busPIRone (BUSPAR) 7.5 MG tablet Take 1 tablet by mouth 2 (Two) Times a Day. 180  "tablet 1   • [DISCONTINUED] FLUoxetine (PROzac) 20 MG capsule TAKE 1 CAPSULE BY MOUTH EVERY DAY 90 capsule 3   • [DISCONTINUED] olmesartan-hydrochlorothiazide (BENICAR HCT) 20-12.5 MG per tablet TAKE 1 TABLET BY MOUTH EVERY DAY 30 tablet 5   • [DISCONTINUED] predniSONE (DELTASONE) 10 MG tablet 6 tabs po qd x 2 days, 4 tabs po qd x 2 days, 3 tabs po qd x 2 days, 2 tabs po qd x 2 days, 1 tab po qd x 2 days 32 tablet 0     No current facility-administered medications on file prior to visit.       Objective   Vitals:    07/26/21 1354   BP: 112/64   Pulse: 93   SpO2: 98%   Weight: 76.2 kg (168 lb)   Height: 162.6 cm (64\")     Body mass index is 28.84 kg/m².    Physical Exam  Vitals and nursing note reviewed.   Constitutional:       Appearance: She is well-developed.   HENT:      Head: Normocephalic and atraumatic.   Neck:      Thyroid: No thyromegaly.      Vascular: No JVD.   Cardiovascular:      Rate and Rhythm: Normal rate and regular rhythm.      Heart sounds: Normal heart sounds. No murmur heard.   No friction rub. No gallop.    Pulmonary:      Effort: Pulmonary effort is normal. No respiratory distress.      Breath sounds: Normal breath sounds. No wheezing or rales.   Abdominal:      General: Bowel sounds are normal. There is no distension.      Palpations: Abdomen is soft.      Tenderness: There is no abdominal tenderness. There is no guarding or rebound.   Musculoskeletal:      Cervical back: Neck supple.   Skin:     General: Skin is warm and dry.   Neurological:      Mental Status: She is alert.   Psychiatric:         Behavior: Behavior normal.         Assessment/Plan   Diagnoses and all orders for this visit:    1. Wellness examination (Primary)    2. Essential hypertension  -     bisoprolol-hydrochlorothiazide (Ziac) 10-6.25 MG per tablet; Take 1 tablet by mouth Daily.  Dispense: 90 tablet; Refill: 3    3. LORRIE (generalized anxiety disorder)  -     FLUoxetine (PROzac) 20 MG capsule; Take 1 capsule by mouth " Daily.  Dispense: 90 capsule; Refill: 3    4. Mixed hyperlipidemia    Other orders  -     busPIRone (BUSPAR) 10 MG tablet; Take 1 tablet by mouth 2 (two) times a day.  Dispense: 180 tablet; Refill: 3  -     olmesartan-hydrochlorothiazide (BENICAR HCT) 20-12.5 MG per tablet; Take 1 tablet by mouth Daily.  Dispense: 90 tablet; Refill: 3      Labs done already may, look fine  Ok try b12 for numbness/burning;  Denies back pain  continue meds for htn and lipids  Counseled on covid 19 vaccine and scheduled for this Friday 7/30/21  Sent meds with [facility] for meds and changed to [facility] with 90 days and 3 ref.         -Follow up: 6 months and prn

## 2021-07-26 NOTE — PATIENT INSTRUCTIONS
TIME TO VACCINATE FOR COVID 19  WEBSITE:  [email or web address]  We are currently in the 1A and 1B phase (see chart). If you are over age 70, you are now eligible for COVID vaccination. You can register directly for an appointment via our healthcare partners [phone]):  [facility]  Call [phone]  [facility]  [email or web address]  [email or web address];  Or you can text [employer] ([phone]) COVID and register via your smart phone.  • Do not sign up via multiple sites. Due to available supply, appointments are limited.  • If you have health insurance, including Medicare, you must bring your insurance card. There is no cost to you, regardless of your insurance status. All sites operate by appointment only and will require proof of age (such as a valid state identification) on arrival.  Sign up for our Vaccine Interest List - This is an information form to keep you in the know about where we are with the vaccine process. This form will not sign you up to receive the vaccine, but it will help you stay informed so you know when you will get it. Sign up now! (GO TO WEBSITE FOR FORM)    [facility] is now scheduling COVID-19 vaccinations for   Phases 1A and 1B of [place].  Phase 1A is meant for healthcare personnel, employed in the state [place]. In phase 1B we are focusing on those age 70 and over. Because initial supplies are limited, we are prioritizing administration based on guidance from the CDC and [place] state authorities. If you meet the current criteria, you may schedule an appointment to be vaccinated online. COVID Vaccines are only approved for people 16 years of age or older. All minors must be accompanied by a parent or guardian.  You DO NOT need a [employer] account to schedule your appointment. However, having a [employer] account will allow you  flexibility when rescheduling an appointment. Sign up for [employer].    Click this link to scheduled a vaccine:  [email or web address]    Other places to find to schedule vaccine:  [email or web address]-information

## 2021-07-30 ENCOUNTER — APPOINTMENT (OUTPATIENT)
Dept: VACCINE CLINIC | Facility: HOSPITAL | Age: 51
End: 2021-07-30

## 2021-08-07 ENCOUNTER — APPOINTMENT (OUTPATIENT)
Dept: VACCINE CLINIC | Facility: HOSPITAL | Age: 51
End: 2021-08-07

## 2021-08-09 RX ORDER — ALBUTEROL SULFATE 90 UG/1
2 AEROSOL, METERED RESPIRATORY (INHALATION) EVERY 4 HOURS PRN
Qty: 6.7 G | Refills: 12 | Status: SHIPPED | OUTPATIENT
Start: 2021-08-09 | End: 2022-01-10

## 2021-08-09 RX ORDER — DOXYCYCLINE HYCLATE 100 MG/1
100 CAPSULE ORAL 2 TIMES DAILY
Qty: 20 CAPSULE | Refills: 0 | Status: SHIPPED | OUTPATIENT
Start: 2021-08-09 | End: 2022-01-10

## 2021-08-19 ENCOUNTER — OFFICE VISIT (OUTPATIENT)
Dept: GASTROENTEROLOGY | Facility: CLINIC | Age: 51
End: 2021-08-19

## 2021-08-19 ENCOUNTER — TELEPHONE (OUTPATIENT)
Dept: GASTROENTEROLOGY | Facility: CLINIC | Age: 51
End: 2021-08-19

## 2021-08-19 VITALS — BODY MASS INDEX: 28.75 KG/M2 | TEMPERATURE: 97.7 F | HEIGHT: 64 IN | WEIGHT: 168.4 LBS

## 2021-08-19 DIAGNOSIS — K62.5 RECTAL BLEEDING: ICD-10-CM

## 2021-08-19 DIAGNOSIS — R19.5 HEME POSITIVE STOOL: Primary | ICD-10-CM

## 2021-08-19 LAB
BASOPHILS # BLD AUTO: 0.07 10*3/MM3 (ref 0–0.2)
BASOPHILS NFR BLD AUTO: 0.6 % (ref 0–1.5)
EOSINOPHIL # BLD AUTO: 0.17 10*3/MM3 (ref 0–0.4)
EOSINOPHIL NFR BLD AUTO: 1.6 % (ref 0.3–6.2)
ERYTHROCYTE [DISTWIDTH] IN BLOOD BY AUTOMATED COUNT: 13.1 % (ref 12.3–15.4)
HCT VFR BLD AUTO: 43 % (ref 34–46.6)
HGB BLD-MCNC: 14.4 G/DL (ref 12–15.9)
IMM GRANULOCYTES # BLD AUTO: 0.05 10*3/MM3 (ref 0–0.05)
IMM GRANULOCYTES NFR BLD AUTO: 0.5 % (ref 0–0.5)
LYMPHOCYTES # BLD AUTO: 2.84 10*3/MM3 (ref 0.7–3.1)
LYMPHOCYTES NFR BLD AUTO: 26.3 % (ref 19.6–45.3)
MCH RBC QN AUTO: 31.3 PG (ref 26.6–33)
MCHC RBC AUTO-ENTMCNC: 33.5 G/DL (ref 31.5–35.7)
MCV RBC AUTO: 93.5 FL (ref 79–97)
MONOCYTES # BLD AUTO: 0.54 10*3/MM3 (ref 0.1–0.9)
MONOCYTES NFR BLD AUTO: 5 % (ref 5–12)
NEUTROPHILS # BLD AUTO: 7.13 10*3/MM3 (ref 1.7–7)
NEUTROPHILS NFR BLD AUTO: 66 % (ref 42.7–76)
NRBC BLD AUTO-RTO: 0 /100 WBC (ref 0–0.2)
PLATELET # BLD AUTO: 391 10*3/MM3 (ref 140–450)
RBC # BLD AUTO: 4.6 10*6/MM3 (ref 3.77–5.28)
WBC # BLD AUTO: 10.8 10*3/MM3 (ref 3.4–10.8)

## 2021-08-19 PROCEDURE — 99204 OFFICE O/P NEW MOD 45 MIN: CPT | Performed by: INTERNAL MEDICINE

## 2021-08-19 RX ORDER — LANOLIN ALCOHOL/MO/W.PET/CERES
1000 CREAM (GRAM) TOPICAL DAILY
COMMUNITY
End: 2022-11-04

## 2021-08-19 NOTE — PROGRESS NOTES
Chief Complaint   Patient presents with   • Rectal Bleeding   • change in bowels   • mucus in stool       History of Present Illness:   50 y.o. female who was found to have heme positive stool. +bright red blood on tissue and some mucous. No melena. No h/o GI bleeding. Occasional diarrhea. May last 2-3 days. Occasional constipation. May have 2-3 BM/day and then no BM x 1-2 days. NO abdominal or chest pain. No nausea or vomting. NO fevers, chills. Weight stable. Denies NSAIDs use. Smokes 1 PPD. ETOH - rare. + heartburn. NO dysphagia.        Previously a colonoscopy was requested because of heme positive stool.    Past Medical History:   Diagnosis Date   • Anxiety    • Depression    • HTN (hypertension)    • Hyperlipidemia    • Mitral regurgitation    • Palpitations    • Renal calculi        Past Surgical History:   Procedure Laterality Date   • BREAST BIOPSY     •  SECTION     • KIDNEY STONE SURGERY     • WISDOM TOOTH EXTRACTION           Current Outpatient Medications:   •  albuterol sulfate  (90 Base) MCG/ACT inhaler, Inhale 2 puffs Every 4 (Four) Hours As Needed for Wheezing or Shortness of Air., Disp: 6.7 g, Rfl: 12  •  bisoprolol-hydrochlorothiazide (Ziac) 10-6.25 MG per tablet, Take 1 tablet by mouth Daily., Disp: 90 tablet, Rfl: 3  •  busPIRone (BUSPAR) 10 MG tablet, Take 1 tablet by mouth 2 (two) times a day., Disp: 180 tablet, Rfl: 3  •  FLUoxetine (PROzac) 20 MG capsule, Take 1 capsule by mouth Daily., Disp: 90 capsule, Rfl: 3  •  olmesartan-hydrochlorothiazide (BENICAR HCT) 20-12.5 MG per tablet, Take 1 tablet by mouth Daily., Disp: 90 tablet, Rfl: 3  •  vitamin B-12 (CYANOCOBALAMIN) 1000 MCG tablet, Take 1,000 mcg by mouth Daily., Disp: , Rfl:   •  doxycycline (VIBRAMYCIN) 100 MG capsule, Take 1 capsule by mouth 2 (Two) Times a Day., Disp: 20 capsule, Rfl: 0    Allergies   Allergen Reactions   • Hydrocodone-Acetaminophen Itching       Family History   Problem Relation Age of Onset   •  Hypertension Mother    • Heart attack Mother    • Diabetes Mother    • Heart disease Father         unsure full hx.   • Cancer Brother    • Breast cancer Maternal Grandmother         50s recurrence in her late 60s   • Colon cancer Maternal Grandmother         70s y/o   • Hypertension Maternal Grandmother    • Alzheimer's disease Maternal Grandmother    • Hypertension Maternal Grandfather    • Emphysema Maternal Grandfather    • Diabetes Paternal Grandmother    • No Known Problems Paternal Grandfather        Social History     Socioeconomic History   • Marital status: Legally      Spouse name: Roddy   • Number of children: 3   • Years of education: 12   • Highest education level: Not on file   Tobacco Use   • Smoking status: Current Every Day Smoker     Packs/day: 1.00     Years: 26.00     Pack years: 26.00     Types: Cigarettes     Start date: 1/1/1992   • Smokeless tobacco: Never Used   • Tobacco comment: Has tried Chantix and Wellbutrin and not wanting to try anything else at this time.   Substance and Sexual Activity   • Alcohol use: Yes     Comment: RARELY   • Drug use: No   • Sexual activity: Not Currently     Partners: Male     Birth control/protection: I.U.D.       Review of Systems   Gastrointestinal: Negative for abdominal pain.   All other systems reviewed and are negative.    Pertinent positives and negatives documented in the HPI and all other systems reviewed and were found to be negative.  Vitals:    08/19/21 1011   Temp: 97.7 °F (36.5 °C)       Physical Exam  Vitals reviewed.   Constitutional:       General: She is not in acute distress.     Appearance: Normal appearance. She is well-developed. She is not diaphoretic.   HENT:      Head: Normocephalic and atraumatic. Hair is normal.      Right Ear: Hearing, tympanic membrane, ear canal and external ear normal. No decreased hearing noted. No drainage.      Left Ear: Hearing, tympanic membrane, ear canal and external ear normal. No decreased  hearing noted.      Nose: Nose normal. No nasal deformity.      Mouth/Throat:      Mouth: Mucous membranes are moist.   Eyes:      General: Lids are normal.         Right eye: No discharge.         Left eye: No discharge.      Extraocular Movements: Extraocular movements intact.      Conjunctiva/sclera: Conjunctivae normal.      Pupils: Pupils are equal, round, and reactive to light.   Neck:      Thyroid: No thyromegaly.      Vascular: No JVD.      Trachea: No tracheal deviation.   Cardiovascular:      Rate and Rhythm: Normal rate and regular rhythm.      Pulses: Normal pulses.      Heart sounds: Normal heart sounds. No murmur heard.   No friction rub. No gallop.    Pulmonary:      Effort: Pulmonary effort is normal. No respiratory distress.      Breath sounds: Normal breath sounds. No wheezing or rales.   Chest:      Chest wall: No tenderness.   Abdominal:      General: Bowel sounds are normal. There is no distension.      Palpations: Abdomen is soft. There is no mass.      Tenderness: There is no abdominal tenderness. There is no guarding or rebound.      Hernia: No hernia is present.   Musculoskeletal:         General: No tenderness or deformity. Normal range of motion.      Cervical back: Normal range of motion and neck supple.   Lymphadenopathy:      Cervical: No cervical adenopathy.   Skin:     General: Skin is warm and dry.      Findings: No erythema or rash.   Neurological:      Mental Status: She is alert and oriented to person, place, and time.      Cranial Nerves: No cranial nerve deficit.      Motor: No abnormal muscle tone.      Coordination: Coordination normal.      Deep Tendon Reflexes: Reflexes are normal and symmetric. Reflexes normal.   Psychiatric:         Mood and Affect: Mood normal.         Behavior: Behavior normal.         Thought Content: Thought content normal.         Judgment: Judgment normal.         Diagnoses and all orders for this visit:    1. Heme positive stool (Primary)  -      Case Request; Standing  -     Case Request  -     CBC & Differential    2. Rectal bleeding  -     Case Request; Standing  -     Case Request  -     CBC & Differential    Other orders  -     Follow Anesthesia Guidelines / Standing Orders; Future  -     Obtain Informed Consent; Future  -     Implement Anesthesia orders day of procedure.; Standing  -     Obtain informed consent; Standing  -     Verify bowel prep was successful; Standing  -     Give tap water enema if bowel prep was insufficient; Standing      Assessment:  1. Rectal bleeding with mucous.  2. Heme positive stool.   3. Heartburn.    Recommendations:  1. EGD and colonoscopy  2. CBC    Return Schedule her to have an EGD on the day of next weeks colonoscopy..    Theodore Rosen MD  8/19/2021

## 2021-08-20 ENCOUNTER — TRANSCRIBE ORDERS (OUTPATIENT)
Dept: SLEEP MEDICINE | Facility: HOSPITAL | Age: 51
End: 2021-08-20

## 2021-08-20 ENCOUNTER — TELEPHONE (OUTPATIENT)
Dept: GASTROENTEROLOGY | Facility: CLINIC | Age: 51
End: 2021-08-20

## 2021-08-20 DIAGNOSIS — Z01.818 OTHER SPECIFIED PRE-OPERATIVE EXAMINATION: Primary | ICD-10-CM

## 2021-08-20 NOTE — PROGRESS NOTES
08/20/21  Tell her that her CBC is normal with a hemoglobin of 14.4, which is good.  We will see what the EGD and colonoscopy show.  Please fax a copy of this report to her PCP.  Aliza guthrie

## 2021-08-20 NOTE — TELEPHONE ENCOUNTER
Called pt and advised of Dr Rosen's note. Verb understanding.      Results sent to Dr Barahona thru [facility].

## 2021-08-20 NOTE — TELEPHONE ENCOUNTER
----- Message from Theodore Rosen MD sent at 8/20/2021  7:55 AM EDT -----  08/20/21  Tell her that her CBC is normal with a hemoglobin of 14.4, which is good.  We will see what the EGD and colonoscopy show.  Please fax a copy of this report to her PCP.  Thx. kjh

## 2021-08-24 ENCOUNTER — LAB (OUTPATIENT)
Dept: LAB | Facility: HOSPITAL | Age: 51
End: 2021-08-24

## 2021-08-24 DIAGNOSIS — Z01.818 OTHER SPECIFIED PRE-OPERATIVE EXAMINATION: ICD-10-CM

## 2021-08-24 LAB — SARS-COV-2 ORF1AB RESP QL NAA+PROBE: NOT DETECTED

## 2021-08-24 PROCEDURE — U0004 COV-19 TEST NON-CDC HGH THRU: HCPCS

## 2021-08-24 PROCEDURE — C9803 HOPD COVID-19 SPEC COLLECT: HCPCS

## 2021-08-26 ENCOUNTER — ANESTHESIA (OUTPATIENT)
Dept: GASTROENTEROLOGY | Facility: HOSPITAL | Age: 51
End: 2021-08-26

## 2021-08-26 ENCOUNTER — ANESTHESIA EVENT (OUTPATIENT)
Dept: GASTROENTEROLOGY | Facility: HOSPITAL | Age: 51
End: 2021-08-26

## 2021-08-26 ENCOUNTER — HOSPITAL ENCOUNTER (OUTPATIENT)
Facility: HOSPITAL | Age: 51
Setting detail: HOSPITAL OUTPATIENT SURGERY
Discharge: HOME | End: 2021-08-26
Attending: INTERNAL MEDICINE | Admitting: INTERNAL MEDICINE

## 2021-08-26 VITALS
TEMPERATURE: 98.2 F | DIASTOLIC BLOOD PRESSURE: 74 MMHG | HEART RATE: 78 BPM | BODY MASS INDEX: 28.75 KG/M2 | HEIGHT: 64 IN | OXYGEN SATURATION: 95 % | RESPIRATION RATE: 16 BRPM | SYSTOLIC BLOOD PRESSURE: 130 MMHG | WEIGHT: 168.4 LBS

## 2021-08-26 DIAGNOSIS — R19.5 HEME POSITIVE STOOL: ICD-10-CM

## 2021-08-26 DIAGNOSIS — K62.5 RECTAL BLEEDING: ICD-10-CM

## 2021-08-26 PROCEDURE — 43239 EGD BIOPSY SINGLE/MULTIPLE: CPT | Performed by: INTERNAL MEDICINE

## 2021-08-26 PROCEDURE — 25010000002 ONDANSETRON PER 1 MG: Performed by: INTERNAL MEDICINE

## 2021-08-26 PROCEDURE — 45385 COLONOSCOPY W/LESION REMOVAL: CPT | Performed by: INTERNAL MEDICINE

## 2021-08-26 PROCEDURE — 45380 COLONOSCOPY AND BIOPSY: CPT | Performed by: INTERNAL MEDICINE

## 2021-08-26 PROCEDURE — 88312 SPECIAL STAINS GROUP 1: CPT | Performed by: INTERNAL MEDICINE

## 2021-08-26 PROCEDURE — 25010000002 PROPOFOL 10 MG/ML EMULSION: Performed by: ANESTHESIOLOGY

## 2021-08-26 PROCEDURE — 88305 TISSUE EXAM BY PATHOLOGIST: CPT | Performed by: INTERNAL MEDICINE

## 2021-08-26 RX ORDER — ONDANSETRON 2 MG/ML
4 INJECTION INTRAMUSCULAR; INTRAVENOUS ONCE
Status: COMPLETED | OUTPATIENT
Start: 2021-08-26 | End: 2021-08-26

## 2021-08-26 RX ORDER — PROPOFOL 10 MG/ML
VIAL (ML) INTRAVENOUS AS NEEDED
Status: DISCONTINUED | OUTPATIENT
Start: 2021-08-26 | End: 2021-08-26 | Stop reason: SURG

## 2021-08-26 RX ORDER — ONDANSETRON 2 MG/ML
4 INJECTION INTRAMUSCULAR; INTRAVENOUS ONCE AS NEEDED
Status: DISCONTINUED | OUTPATIENT
Start: 2021-08-26 | End: 2021-08-26 | Stop reason: HOSPADM

## 2021-08-26 RX ORDER — PROPOFOL 10 MG/ML
VIAL (ML) INTRAVENOUS CONTINUOUS PRN
Status: DISCONTINUED | OUTPATIENT
Start: 2021-08-26 | End: 2021-08-26 | Stop reason: SURG

## 2021-08-26 RX ORDER — SODIUM CHLORIDE, SODIUM LACTATE, POTASSIUM CHLORIDE, CALCIUM CHLORIDE 600; 310; 30; 20 MG/100ML; MG/100ML; MG/100ML; MG/100ML
30 INJECTION, SOLUTION INTRAVENOUS CONTINUOUS PRN
Status: DISCONTINUED | OUTPATIENT
Start: 2021-08-26 | End: 2021-08-26 | Stop reason: HOSPADM

## 2021-08-26 RX ORDER — LIDOCAINE HYDROCHLORIDE 20 MG/ML
INJECTION, SOLUTION INFILTRATION; PERINEURAL AS NEEDED
Status: DISCONTINUED | OUTPATIENT
Start: 2021-08-26 | End: 2021-08-26 | Stop reason: SURG

## 2021-08-26 RX ADMIN — SODIUM CHLORIDE, POTASSIUM CHLORIDE, SODIUM LACTATE AND CALCIUM CHLORIDE: 600; 310; 30; 20 INJECTION, SOLUTION INTRAVENOUS at 17:13

## 2021-08-26 RX ADMIN — LIDOCAINE HYDROCHLORIDE 40 MG: 20 INJECTION, SOLUTION INFILTRATION; PERINEURAL at 16:32

## 2021-08-26 RX ADMIN — PROPOFOL 50 MG: 10 INJECTION, EMULSION INTRAVENOUS at 16:37

## 2021-08-26 RX ADMIN — PROPOFOL 100 MG: 10 INJECTION, EMULSION INTRAVENOUS at 16:33

## 2021-08-26 RX ADMIN — Medication 180 MCG/KG/MIN: at 16:37

## 2021-08-26 RX ADMIN — SODIUM CHLORIDE, POTASSIUM CHLORIDE, SODIUM LACTATE AND CALCIUM CHLORIDE 30 ML/HR: 600; 310; 30; 20 INJECTION, SOLUTION INTRAVENOUS at 14:33

## 2021-08-26 RX ADMIN — ONDANSETRON 4 MG: 2 INJECTION INTRAMUSCULAR; INTRAVENOUS at 14:33

## 2021-08-26 NOTE — ANESTHESIA PREPROCEDURE EVALUATION
Anesthesia Evaluation     Patient summary reviewed and Nursing notes reviewed                Airway   Mallampati: I  TM distance: >3 FB  Neck ROM: full  No difficulty expected  Dental    (+) poor dentition    Comment: Due to poor dentition/dental work, the risk of dental damage was discussed/reinforced with the patient.    Pulmonary - normal exam   (+) a smoker Current,   Cardiovascular - normal exam    (+) hypertension, valvular problems/murmurs TI and MR, hyperlipidemia,       Neuro/Psych  (+) psychiatric history Anxiety and Depression,     GI/Hepatic/Renal/Endo    (+)  GI bleeding , renal disease stones,   (-) hepatitis    Musculoskeletal (-) negative ROS    Abdominal  - normal exam    Bowel sounds: normal.   Substance History - negative use     OB/GYN negative ob/gyn ROS         Other                      Anesthesia Plan    ASA 3     MAC       Anesthetic plan, all risks, benefits, and alternatives have been provided, discussed and informed consent has been obtained with: patient.

## 2021-08-26 NOTE — ANESTHESIA POSTPROCEDURE EVALUATION
Patient: Myriam Thomas    Procedure Summary     Date: 08/26/21 [facility] / [facility] / [facility] END[facility]COPY    Anesthesia Start: 1627 Anesthesia Stop: 1745    Procedures:       ESOPHAGOGASTRODUODENOSCOPY with biopsies (N/A Esophagus)      COLONOSCOPY to cecum and TI:  biopsies, cold biopsy polyps, hot snare polyp (N/A ) Diagnosis:       Heme positive stool      Rectal bleeding      (Heme positive stool [R19.5])      (Rectal bleeding [K62.5])    Surgeons: Theodore Rosen MD Provider: Mishel King MD    Anesthesia Type: MAC ASA Status: 3          Anesthesia Type: MAC    Vitals  Vitals Value Taken Time   /74 08/26/21 1807   Temp     Pulse 78 08/26/21 1807   Resp 16 08/26/21 1807   SpO2 95 % 08/26/21 1807           Post Anesthesia Care and Evaluation    Patient location during evaluation: PACU  Patient participation: complete - patient participated  Level of consciousness: awake  Pain score: 1  Pain management: adequate  Airway patency: patent  Anesthetic complications: No anesthetic complications  PONV Status: none  Cardiovascular status: acceptable  Respiratory status: acceptable  Hydration status: acceptable    Comments: --------------------            08/26/21 1807     --------------------   BP:       130/74     Pulse:      78       Resp:       16       Temp:                SpO2:      95%      --------------------

## 2021-09-01 LAB
CYTO UR: NORMAL
LAB AP CASE REPORT: NORMAL
PATH REPORT.FINAL DX SPEC: NORMAL
PATH REPORT.GROSS SPEC: NORMAL

## 2021-09-10 ENCOUNTER — TELEPHONE (OUTPATIENT)
Dept: GASTROENTEROLOGY | Facility: CLINIC | Age: 51
End: 2021-09-10

## 2021-09-10 NOTE — TELEPHONE ENCOUNTER
----- Message from Lesa Quiros sent at 9/10/2021 11:22 AM EDT -----  Regarding: scope results  Contact: [phone]  Pt is calling to get her results.

## 2021-09-11 ENCOUNTER — IMMUNIZATION (OUTPATIENT)
Dept: VACCINE CLINIC | Facility: HOSPITAL | Age: 51
End: 2021-09-11

## 2021-09-11 PROCEDURE — 91300 HC SARSCOV02 VAC 30MCG/0.3ML IM: CPT | Performed by: INTERNAL MEDICINE

## 2021-09-11 PROCEDURE — 0001A: CPT | Performed by: INTERNAL MEDICINE

## 2021-09-12 NOTE — PROGRESS NOTES
09/12/21       Tell her that path from her recent EGD looked good.        The colon polyps that were removed were not cancerous but the larger colon polyp located at 18 cms from the anus was precancerous. I recommend that we repeat a colonoscopy in one year to make sure that all of that larger polyp was completely removed. FAX to her PCP.   Thx. kjh

## 2021-09-13 ENCOUNTER — TELEPHONE (OUTPATIENT)
Dept: GASTROENTEROLOGY | Facility: CLINIC | Age: 51
End: 2021-09-13

## 2021-09-13 NOTE — TELEPHONE ENCOUNTER
----- Message from Theodore Rosen MD sent at 9/12/2021  6:09 PM EDT -----  09/12/21       Tell her that path from her recent EGD looked good.        The colon polyps that were removed were not cancerous but the larger colon polyp located at 18 cms from the anus was precancerous. I recommend that we repeat a colonoscopy in one year to make sure that all of that larger polyp was completely removed. FAX to her PCP.   Aliza guthrie

## 2021-09-13 NOTE — TELEPHONE ENCOUNTER
Call to pt.  Advise per DR Rosen note.  Verb understanding.     C/s for 8/26/22 placed in recall and HM.     Update to Dr Lizandro Barahona.

## 2021-10-02 ENCOUNTER — IMMUNIZATION (OUTPATIENT)
Dept: VACCINE CLINIC | Facility: HOSPITAL | Age: 51
End: 2021-10-02

## 2021-10-02 PROCEDURE — 91300 HC SARSCOV02 VAC 30MCG/0.3ML IM: CPT | Performed by: INTERNAL MEDICINE

## 2021-10-02 PROCEDURE — 0002A: CPT | Performed by: INTERNAL MEDICINE

## 2021-12-09 ENCOUNTER — TELEPHONE (OUTPATIENT)
Dept: FAMILY MEDICINE CLINIC | Facility: CLINIC | Age: 51
End: 2021-12-09

## 2021-12-09 NOTE — TELEPHONE ENCOUNTER
Yes it will work.  Let know if no improvemetn;  If fevers, worsening pain or shortness of air, go ER>

## 2021-12-09 NOTE — TELEPHONE ENCOUNTER
Lets order venous doppler, need know which leg?  Keep elevated as much as possible and start prednisone.  Also, if any redness, chest pain or breathing issues, go ER ASAP.

## 2021-12-09 NOTE — TELEPHONE ENCOUNTER
Caller: Myriam Thomas    Relationship to patient: Self    Best call back number: [phone]    Patient is needing: PATIENT STATES SHE IS HAVING A GOUT FLARE UP AND IS HAVING PAIN AND SWELLING IN HER FOOT AND CALF, SHE WANTS TO KNOW IF ITS OK IF SHE TAKES THE PREDNISONE SHE HAS ON HAND THAT DR. MANCINI GAVE HER PREVIOUSLY FOR GOUT FOLLOWING WHAT'S ON THE BOTTLE? PLEASE ADVISE

## 2022-01-10 ENCOUNTER — OFFICE VISIT (OUTPATIENT)
Dept: CARDIOLOGY | Facility: CLINIC | Age: 52
End: 2022-01-10

## 2022-01-10 VITALS
HEIGHT: 64 IN | SYSTOLIC BLOOD PRESSURE: 169 MMHG | BODY MASS INDEX: 28 KG/M2 | DIASTOLIC BLOOD PRESSURE: 102 MMHG | WEIGHT: 164 LBS | HEART RATE: 58 BPM

## 2022-01-10 DIAGNOSIS — R06.02 SOB (SHORTNESS OF BREATH): ICD-10-CM

## 2022-01-10 DIAGNOSIS — I10 ESSENTIAL HYPERTENSION: Primary | ICD-10-CM

## 2022-01-10 DIAGNOSIS — R00.2 PALPITATIONS: ICD-10-CM

## 2022-01-10 DIAGNOSIS — E78.2 MIXED HYPERLIPIDEMIA: ICD-10-CM

## 2022-01-10 DIAGNOSIS — Z72.0 TOBACCO USE: ICD-10-CM

## 2022-01-10 PROCEDURE — 99213 OFFICE O/P EST LOW 20 MIN: CPT | Performed by: INTERNAL MEDICINE

## 2022-01-10 PROCEDURE — 93000 ELECTROCARDIOGRAM COMPLETE: CPT | Performed by: INTERNAL MEDICINE

## 2022-01-10 NOTE — PROGRESS NOTES
1 YR FOLLOW UP, HAS HAD SHORTNESS OF BREATH X 1 MONTH.   Subjective:        Myriam Thomas is a 51 y.o. female who here for follow up    CC  MORE SOB  RT LEG SWELLING  BP HIGH  HPI  51-year-old female here for the follow-up complains of right leg swelling, also complain of increasing shortness of breath and has blood pressure running high     Problems Addressed this Visit        Cardiac and Vasculature    Palpitations    Essential hypertension - Primary    Relevant Orders    Treadmill Stress Test    Mixed hyperlipidemia       Tobacco    Tobacco use      Other Visit Diagnoses     SOB (shortness of breath)        Relevant Orders    Treadmill Stress Test      Diagnoses       Codes Comments    Essential hypertension    -  Primary ICD-10-CM: I10  ICD-9-CM: 401.9     SOB (shortness of breath)     ICD-10-CM: R06.02  ICD-9-CM: 786.05     Mixed hyperlipidemia     ICD-10-CM: E78.2  ICD-9-CM: 272.2     Palpitations     ICD-10-CM: R00.2  ICD-9-CM: 785.1     Tobacco use     ICD-10-CM: Z72.0  ICD-9-CM: 305.1         .    The following portions of the patient's history were reviewed and updated as appropriate: allergies, current medications, past family history, past medical history, past social history, past surgical history and problem list.    Past Medical History:   Diagnosis Date   • Anxiety    • Depression    • HTN (hypertension)    • Hyperlipidemia    • Mitral regurgitation    • Palpitations    • Renal calculi      reports that she has been smoking cigarettes. She started smoking about 30 years ago. She has a 26.00 pack-year smoking history. She has never used smokeless tobacco. She reports current alcohol use. She reports that she does not use drugs.   Family History   Problem Relation Age of Onset   • Hypertension Mother    • Heart attack Mother    • Diabetes Mother    • Heart disease Father         unsure full hx.   • Cancer Brother    • Breast cancer Maternal Grandmother         50s recurrence in her late 60s   • Colon  "cancer Maternal Grandmother         70s y/o   • Hypertension Maternal Grandmother    • Alzheimer's disease Maternal Grandmother    • Hypertension Maternal Grandfather    • Emphysema Maternal Grandfather    • Diabetes Paternal Grandmother    • No Known Problems Paternal Grandfather        Review of Systems  Constitutional: No wt loss, fever, fatigue  Gastrointestinal: No nausea, abdominal pain  Behavioral/Psych: No insomnia or anxiety   Cardiovascular shortness of breath and leg swelling  Objective:       Physical Exam  BP (!) 169/102   Pulse 58   Ht 162.6 cm (64\")   Wt 74.4 kg (164 lb)   BMI 28.15 kg/m²   General appearance: No acute changes   Neck: Trachea midline; NECK, supple, no thyromegaly or lymphadenopathy   Lungs: Normal size and shape, normal breath sounds, equal distribution of air, no rales and rhonchi   CV: S1-S2 regular, no murmurs, no rub, no gallop   Abdomen: Soft, nontender; no masses , no abnormal abdominal sounds   Extremities: No deformity , normal color , no peripheral edema   Skin: Normal temperature, turgor and texture; no rash, ulcers            ECG 12 Lead    Date/Time: 1/10/2022 2:27 PM  Performed by: Noreen Beauchamp MD  Authorized by: Noreen Beauchamp MD   Comparison: compared with previous ECG   Similar to previous ECG  Rhythm: sinus rhythm  ST Flattening: all    Clinical impression: non-specific ECG              Echocardiogram:        Current Outpatient Medications:   •  bisoprolol-hydrochlorothiazide (Ziac) 10-6.25 MG per tablet, Take 1 tablet by mouth Daily., Disp: 90 tablet, Rfl: 3  •  busPIRone (BUSPAR) 10 MG tablet, Take 1 tablet by mouth 2 (two) times a day., Disp: 180 tablet, Rfl: 3  •  FLUoxetine (PROzac) 20 MG capsule, Take 1 capsule by mouth Daily., Disp: 90 capsule, Rfl: 3  •  olmesartan-hydrochlorothiazide (BENICAR HCT) 20-12.5 MG per tablet, Take 1 tablet by mouth Daily., Disp: 90 tablet, Rfl: 3  •  vitamin B-12 (CYANOCOBALAMIN) 1000 MCG tablet, Take 1,000 " mcg by mouth Daily., Disp: , Rfl:    Assessment:        Patient Active Problem List   Diagnosis   • Mitral valve insufficiency   • Palpitations   • Essential hypertension   • Tobacco use   • Anxiety   • Mixed hyperlipidemia   • B12 deficiency   • Smoker unmotivated to quit   • IUD (intrauterine device) in place   • Heme positive stool   • Rectal bleeding               Plan:            ICD-10-CM ICD-9-CM   1. Essential hypertension  I10 401.9   2. SOB (shortness of breath)  R06.02 786.05   3. Mixed hyperlipidemia  E78.2 272.2   4. Palpitations  R00.2 785.1   5. Tobacco use  Z72.0 305.1     1. Essential hypertension  Blood pressure under control  - Treadmill Stress Test    2. SOB (shortness of breath)  Considering the patient's symptoms as well as clinical situation and  EKG findings, along with cardiac risk factors, ischemic workup is necessary to rule out ischemic cardiomyopathy, stress induced arrhythmias, and functional capacity for diagnosis as well as prognostic consideration    - Treadmill Stress Test    3. Mixed hyperlipidemia  Continue current treatment    4. Palpitations  Continue current treatment    5. Tobacco use  Counseling done     CHECK BP TWICE A DAY FOR 2 WKS  2 WKS WITH ETT  COUNSELING:    Myriam Leyva was given to patient for the following topics: diagnostic results, risk factor reductions, impressions, risks and benefits of treatment options and importance of treatment compliance .       SMOKING COUNSELING:    [unfilled]    Dictated using Dragon dictation

## 2022-02-03 ENCOUNTER — APPOINTMENT (OUTPATIENT)
Dept: CARDIOLOGY | Facility: HOSPITAL | Age: 52
End: 2022-02-03

## 2022-02-03 ENCOUNTER — TELEMEDICINE (OUTPATIENT)
Dept: FAMILY MEDICINE CLINIC | Facility: CLINIC | Age: 52
End: 2022-02-03

## 2022-02-03 DIAGNOSIS — R51.9 NEW ONSET HEADACHE: ICD-10-CM

## 2022-02-03 DIAGNOSIS — I10 ESSENTIAL HYPERTENSION: Primary | ICD-10-CM

## 2022-02-03 PROCEDURE — 99214 OFFICE O/P EST MOD 30 MIN: CPT | Performed by: FAMILY MEDICINE

## 2022-02-03 RX ORDER — AMLODIPINE BESYLATE 5 MG/1
5 TABLET ORAL DAILY
Qty: 30 TABLET | Refills: 5 | Status: SHIPPED | OUTPATIENT
Start: 2022-02-03 | End: 2023-03-13

## 2022-02-03 NOTE — PROGRESS NOTES
Subjective   Myriam Thomas is a 51 y.o. female. Presents today for   Chief Complaint   Patient presents with   • Hypertension   • Headache        Patient VIDEO VISIT, she gave consent to treat.    She did see cardiology, worse since saw cardiology.  Hypertension  This is a chronic problem. The current episode started more than 1 year ago. The problem has been waxing and waning since onset. The problem is uncontrolled (erratic and can wak up with headache and bp very high.). Associated symptoms include headaches. Pertinent negatives include no blurred vision, chest pain, orthopnea, palpitations, peripheral edema, PND or shortness of breath. Current antihypertension treatment includes diuretics, beta blockers and angiotensin blockers. The current treatment provides mild improvement. saw cardiolgoy has stress test planned, cancelled due to ice for today but rescheduled.  .   Headache   This is a new problem. The current episode started more than 1 month ago. The problem occurs intermittently. The problem has been waxing and waning. The quality of the pain is described as throbbing. Associated symptoms include weakness. Pertinent negatives include no blurred vision, loss of balance, nausea (Had only 1 episode of nausea; ), seizures, tinnitus or vomiting. Associated symptoms comments: No dysarthria, no facial asymmetries;  No focal neuro deficits.  Did have one episode of both legs felt weak and very nauseated. . She has tried nothing for the symptoms. The treatment provided no relief. Her past medical history is significant for hypertension and migraine headaches (Had when young but went away for most part.  These headaches are not same.). There is no history of recent head traumas.       Review of Systems   HENT: Negative for tinnitus.    Eyes: Negative for blurred vision.   Respiratory: Negative for shortness of breath.    Cardiovascular: Negative for chest pain, palpitations, orthopnea and PND.   Gastrointestinal:  Negative for nausea (Had only 1 episode of nausea; ) and vomiting.   Neurological: Positive for weakness and headaches. Negative for seizures and loss of balance.       Patient Active Problem List   Diagnosis   • Mitral valve insufficiency   • Palpitations   • Essential hypertension   • Tobacco use   • Anxiety   • Mixed hyperlipidemia   • B12 deficiency   • Smoker unmotivated to quit   • IUD (intrauterine device) in place   • Heme positive stool   • Rectal bleeding       Social History     Socioeconomic History   • Marital status:      Spouse name: Roddy   • Number of children: 3   • Years of education: 12   Tobacco Use   • Smoking status: Current Every Day Smoker     Packs/day: 1.00     Years: 26.00     Pack years: 26.00     Types: Cigarettes     Start date: 1/1/1992   • Smokeless tobacco: Never Used   Vaping Use   • Vaping Use: Never used   Substance and Sexual Activity   • Alcohol use: Yes     Comment: RARELY   • Drug use: No   • Sexual activity: Not Currently     Partners: Male     Birth control/protection: I.U.D.       Allergies   Allergen Reactions   • Hydrocodone-Acetaminophen Itching       Current Outpatient Medications on File Prior to Visit   Medication Sig Dispense Refill   • bisoprolol-hydrochlorothiazide (Ziac) 10-6.25 MG per tablet Take 1 tablet by mouth Daily. 90 tablet 3   • busPIRone (BUSPAR) 10 MG tablet Take 1 tablet by mouth 2 (two) times a day. 180 tablet 3   • FLUoxetine (PROzac) 20 MG capsule Take 1 capsule by mouth Daily. 90 capsule 3   • olmesartan-hydrochlorothiazide (BENICAR HCT) 20-12.5 MG per tablet Take 1 tablet by mouth Daily. 90 tablet 3   • vitamin B-12 (CYANOCOBALAMIN) 1000 MCG tablet Take 1,000 mcg by mouth Daily.       No current facility-administered medications on file prior to visit.       Objective   There were no vitals filed for this visit.  There is no height or weight on file to calculate BMI.    Physical Exam  Vitals and nursing note reviewed.   Constitutional:        Appearance: Normal appearance. She is not toxic-appearing or diaphoretic.   HENT:      Head: Normocephalic and atraumatic.   Musculoskeletal:      Cervical back: Neck supple.   Neurological:      Mental Status: She is alert.   Psychiatric:         Mood and Affect: Mood normal.         Behavior: Behavior normal.       Component      Latest Ref Rng & Units 5/25/2021   Glucose      65 - 99 mg/dL 84   BUN      6 - 20 mg/dL 7   Creatinine      0.57 - 1.00 mg/dL 0.80   eGFR Non African Am      >60 mL/min/1.73 77   eGFR African Am      >60 mL/min/1.73 93   BUN/Creatinine Ratio      7.0 - 25.0 8.8   Sodium      136 - 145 mmol/L 136   Potassium      3.5 - 5.2 mmol/L 4.4   Chloride      98 - 107 mmol/L 100   CO2      22.0 - 29.0 mmol/L 26.1   Calcium      8.6 - 10.5 mg/dL 9.6   Total Protein      6.0 - 8.5 g/dL 6.5   Albumin      3.50 - 5.20 g/dL 4.50   Globulin      gm/dL 2.0   A/G Ratio      g/dL 2.3   Total Bilirubin      0.0 - 1.2 mg/dL 0.4   Alkaline Phosphatase      39 - 117 U/L 71   AST (SGOT)      1 - 32 U/L 15   ALT (SGPT)      1 - 33 U/L 18     Assessment/Plan   Diagnoses and all orders for this visit:    1. Essential hypertension (Primary)  -     amLODIPine (NORVASC) 5 MG tablet; Take 1 tablet by mouth Daily.  Dispense: 30 tablet; Refill: 5    2. New onset headache  -     MRI brain w wo contrast; Future    bp uncontrolled and will go ahead and add amlodipine as still high with severe headaches  Will image head as severe headaches with new features;  Could be what is elevating bp or vice versa, but given severity and arousing from sleep should image to evaluate for mass or other lesion.         -Follow up: 8 weeks and prn

## 2022-02-28 ENCOUNTER — HOSPITAL ENCOUNTER (OUTPATIENT)
Dept: CARDIOLOGY | Facility: HOSPITAL | Age: 52
Discharge: HOME | End: 2022-02-28
Admitting: INTERNAL MEDICINE

## 2022-02-28 VITALS — HEART RATE: 61 BPM | DIASTOLIC BLOOD PRESSURE: 90 MMHG | SYSTOLIC BLOOD PRESSURE: 130 MMHG

## 2022-02-28 DIAGNOSIS — I10 ESSENTIAL HYPERTENSION: Primary | ICD-10-CM

## 2022-02-28 DIAGNOSIS — I34.0 MITRAL VALVE INSUFFICIENCY, UNSPECIFIED ETIOLOGY: ICD-10-CM

## 2022-02-28 DIAGNOSIS — E78.2 MIXED HYPERLIPIDEMIA: ICD-10-CM

## 2022-02-28 DIAGNOSIS — Z72.0 TOBACCO USE: ICD-10-CM

## 2022-02-28 DIAGNOSIS — R06.02 SOB (SHORTNESS OF BREATH): ICD-10-CM

## 2022-02-28 PROCEDURE — 93017 CV STRESS TEST TRACING ONLY: CPT

## 2022-02-28 PROCEDURE — 93018 CV STRESS TEST I&R ONLY: CPT | Performed by: INTERNAL MEDICINE

## 2022-03-02 LAB
BH CV STRESS BP STAGE 1: NORMAL
BH CV STRESS DURATION MIN STAGE 1: 2
BH CV STRESS DURATION SEC STAGE 1: 57
BH CV STRESS GRADE STAGE 1: 10
BH CV STRESS HR STAGE 1: 120
BH CV STRESS METS STAGE 1: 4.4
BH CV STRESS PROTOCOL 1: NORMAL
BH CV STRESS RECOVERY BP: NORMAL MMHG
BH CV STRESS RECOVERY HR: 72 BPM
BH CV STRESS SPEED STAGE 1: 1.7
BH CV STRESS STAGE 1: 1
MAXIMAL PREDICTED HEART RATE: 171 BPM
PERCENT MAX PREDICTED HR: 70.18 %
STRESS BASELINE BP: NORMAL MMHG
STRESS BASELINE HR: 61 BPM
STRESS PERCENT HR: 83 %
STRESS POST ESTIMATED WORKLOAD: 4.6 METS
STRESS POST EXERCISE DUR MIN: 2 MIN
STRESS POST EXERCISE DUR SEC: 57 SEC
STRESS POST PEAK BP: NORMAL MMHG
STRESS POST PEAK HR: 120 BPM
STRESS TARGET HR: 145 BPM

## 2022-03-10 ENCOUNTER — HOSPITAL ENCOUNTER (OUTPATIENT)
Dept: CARDIOLOGY | Facility: HOSPITAL | Age: 52
Discharge: HOME | End: 2022-03-10

## 2022-03-10 VITALS
BODY MASS INDEX: 28 KG/M2 | HEIGHT: 64 IN | RESPIRATION RATE: 18 BRPM | WEIGHT: 164 LBS | OXYGEN SATURATION: 96 % | SYSTOLIC BLOOD PRESSURE: 118 MMHG | HEART RATE: 56 BPM | DIASTOLIC BLOOD PRESSURE: 62 MMHG

## 2022-03-10 DIAGNOSIS — I10 ESSENTIAL HYPERTENSION: ICD-10-CM

## 2022-03-10 DIAGNOSIS — Z72.0 TOBACCO USE: ICD-10-CM

## 2022-03-10 DIAGNOSIS — I34.0 MITRAL VALVE INSUFFICIENCY, UNSPECIFIED ETIOLOGY: ICD-10-CM

## 2022-03-10 DIAGNOSIS — E78.2 MIXED HYPERLIPIDEMIA: ICD-10-CM

## 2022-03-10 DIAGNOSIS — R06.02 SOB (SHORTNESS OF BREATH): ICD-10-CM

## 2022-03-10 LAB
BH CV REST NUCLEAR ISOTOPE DOSE: 10.9 MCI
BH CV STRESS BP STAGE 1: NORMAL
BH CV STRESS COMMENTS STAGE 1: NORMAL
BH CV STRESS DOSE REGADENOSON STAGE 1: 0.4
BH CV STRESS DURATION MIN STAGE 1: 1
BH CV STRESS DURATION SEC STAGE 1: 49
BH CV STRESS GRADE STAGE 1: 0
BH CV STRESS HR STAGE 1: 101
BH CV STRESS METS STAGE 1: 1.7
BH CV STRESS NUCLEAR ISOTOPE DOSE: 30.4 MCI
BH CV STRESS PROTOCOL 1: NORMAL
BH CV STRESS RECOVERY BP: NORMAL MMHG
BH CV STRESS RECOVERY HR: 77 BPM
BH CV STRESS RECOVERY O2: 96 %
BH CV STRESS SPEED STAGE 1: 1
BH CV STRESS STAGE 1: 1
LV EF NUC BP: 60 %
MAXIMAL PREDICTED HEART RATE: 171 BPM
PERCENT MAX PREDICTED HR: 59.06 %
STRESS BASELINE BP: NORMAL MMHG
STRESS BASELINE HR: 62 BPM
STRESS O2 SAT REST: 96 %
STRESS PERCENT HR: 69 %
STRESS POST ESTIMATED WORKLOAD: 1.7 METS
STRESS POST EXERCISE DUR MIN: 1 MIN
STRESS POST EXERCISE DUR SEC: 49 SEC
STRESS POST PEAK BP: NORMAL MMHG
STRESS POST PEAK HR: 101 BPM
STRESS TARGET HR: 145 BPM

## 2022-03-10 PROCEDURE — A9500 TC99M SESTAMIBI: HCPCS | Performed by: INTERNAL MEDICINE

## 2022-03-10 PROCEDURE — 78452 HT MUSCLE IMAGE SPECT MULT: CPT | Performed by: INTERNAL MEDICINE

## 2022-03-10 PROCEDURE — 0 TECHNETIUM SESTAMIBI: Performed by: INTERNAL MEDICINE

## 2022-03-10 PROCEDURE — 93018 CV STRESS TEST I&R ONLY: CPT | Performed by: INTERNAL MEDICINE

## 2022-03-10 PROCEDURE — 25010000002 REGADENOSON 0.4 MG/5ML SOLUTION: Performed by: INTERNAL MEDICINE

## 2022-03-10 PROCEDURE — 78452 HT MUSCLE IMAGE SPECT MULT: CPT

## 2022-03-10 PROCEDURE — 93017 CV STRESS TEST TRACING ONLY: CPT

## 2022-03-10 RX ADMIN — REGADENOSON 0.4 MG: 0.08 INJECTION, SOLUTION INTRAVENOUS at 10:31

## 2022-03-10 RX ADMIN — TECHNETIUM TC 99M SESTAMIBI 1 DOSE: 1 INJECTION INTRAVENOUS at 07:45

## 2022-03-10 RX ADMIN — TECHNETIUM TC 99M SESTAMIBI 1 DOSE: 1 INJECTION INTRAVENOUS at 10:31

## 2022-03-14 ENCOUNTER — TRANSCRIBE ORDERS (OUTPATIENT)
Dept: ADMINISTRATIVE | Facility: HOSPITAL | Age: 52
End: 2022-03-14

## 2022-03-14 DIAGNOSIS — Z12.31 SCREENING MAMMOGRAM, ENCOUNTER FOR: Primary | ICD-10-CM

## 2022-03-15 ENCOUNTER — APPOINTMENT (OUTPATIENT)
Dept: MRI IMAGING | Facility: HOSPITAL | Age: 52
End: 2022-03-15

## 2022-03-17 ENCOUNTER — OFFICE VISIT (OUTPATIENT)
Dept: CARDIOLOGY | Facility: CLINIC | Age: 52
End: 2022-03-17

## 2022-03-17 VITALS
BODY MASS INDEX: 27.83 KG/M2 | DIASTOLIC BLOOD PRESSURE: 81 MMHG | HEIGHT: 64 IN | SYSTOLIC BLOOD PRESSURE: 122 MMHG | HEART RATE: 66 BPM | WEIGHT: 163 LBS

## 2022-03-17 DIAGNOSIS — I10 ESSENTIAL HYPERTENSION: ICD-10-CM

## 2022-03-17 DIAGNOSIS — E78.2 MIXED HYPERLIPIDEMIA: ICD-10-CM

## 2022-03-17 DIAGNOSIS — R00.2 PALPITATIONS: Primary | ICD-10-CM

## 2022-03-17 PROCEDURE — 99213 OFFICE O/P EST LOW 20 MIN: CPT | Performed by: INTERNAL MEDICINE

## 2022-03-17 NOTE — PROGRESS NOTES
STRESS TEST AND ECHO RESULTS    Subjective:        Myriam Thomas is a 51 y.o. female who here for follow up    CC  Follow-up with results in preop clearance  HPI  51-year-old female with palpitations hypertension mixed hyperlipidemia here for the follow-up after the stress test needs clearance for the teeth work-up denies any chest pains or tightness in the chest     Problems Addressed this Visit        Cardiac and Vasculature    Palpitations - Primary    Essential hypertension    Mixed hyperlipidemia      Diagnoses       Codes Comments    Palpitations    -  Primary ICD-10-CM: R00.2  ICD-9-CM: 785.1     Essential hypertension     ICD-10-CM: I10  ICD-9-CM: 401.9     Mixed hyperlipidemia     ICD-10-CM: E78.2  ICD-9-CM: 272.2         .Interpretation Summary       · Findings consistent with a normal ECG stress test.  · Left ventricular ejection fraction is normal. (Calculated EF = 60%).  · Myocardial perfusion imaging indicates a normal myocardial perfusion study with no evidence of ischemia.  · Impressions are consistent with a low risk study.        The following portions of the patient's history were reviewed and updated as appropriate: allergies, current medications, past family history, past medical history, past social history, past surgical history and problem list.    Past Medical History:   Diagnosis Date   • Anxiety    • Depression    • HTN (hypertension)    • Hyperlipidemia    • Mitral regurgitation    • Palpitations    • Renal calculi      reports that she has been smoking cigarettes. She started smoking about 30 years ago. She has a 26.00 pack-year smoking history. She has never used smokeless tobacco. She reports current alcohol use. She reports that she does not use drugs.   Family History   Problem Relation Age of Onset   • Hypertension Mother    • Heart attack Mother    • Diabetes Mother    • Heart disease Father         unsure full hx.   • Cancer Brother    • Breast cancer Maternal Grandmother          "50s recurrence in her late 60s   • Colon cancer Maternal Grandmother         70s y/o   • Hypertension Maternal Grandmother    • Alzheimer's disease Maternal Grandmother    • Hypertension Maternal Grandfather    • Emphysema Maternal Grandfather    • Diabetes Paternal Grandmother    • No Known Problems Paternal Grandfather        Review of Systems  Constitutional: No wt loss, fever, fatigue  Gastrointestinal: No nausea, abdominal pain  Behavioral/Psych: No insomnia or anxiety   Cardiovascular no chest pains or tightness in the chest  Objective:       Physical Exam  /81   Pulse 66   Ht 162.6 cm (64\")   Wt 73.9 kg (163 lb)   BMI 27.98 kg/m²   General appearance: No acute changes   Neck: Trachea midline; NECK, supple, no thyromegaly or lymphadenopathy   Lungs: Normal size and shape, normal breath sounds, equal distribution of air, no rales and rhonchi   CV: S1-S2 regular, no murmurs, no rub, no gallop   Abdomen: Soft, nontender; no masses , no abnormal abdominal sounds   Extremities: No deformity , normal color , no peripheral edema   Skin: Normal temperature, turgor and texture; no rash, ulcers          Procedures      Echocardiogram:        Current Outpatient Medications:   •  amLODIPine (NORVASC) 5 MG tablet, Take 1 tablet by mouth Daily., Disp: 30 tablet, Rfl: 5  •  bisoprolol-hydrochlorothiazide (Ziac) 10-6.25 MG per tablet, Take 1 tablet by mouth Daily., Disp: 90 tablet, Rfl: 3  •  busPIRone (BUSPAR) 10 MG tablet, Take 1 tablet by mouth 2 (two) times a day., Disp: 180 tablet, Rfl: 3  •  FLUoxetine (PROzac) 20 MG capsule, Take 1 capsule by mouth Daily., Disp: 90 capsule, Rfl: 3  •  olmesartan-hydrochlorothiazide (BENICAR HCT) 20-12.5 MG per tablet, Take 1 tablet by mouth Daily., Disp: 90 tablet, Rfl: 3  •  vitamin B-12 (CYANOCOBALAMIN) 1000 MCG tablet, Take 1,000 mcg by mouth Daily., Disp: , Rfl:    Assessment:        Patient Active Problem List   Diagnosis   • Mitral valve insufficiency   • Palpitations "   • Essential hypertension   • Tobacco use   • Anxiety   • Mixed hyperlipidemia   • B12 deficiency   • Smoker unmotivated to quit   • IUD (intrauterine device) in place   • Heme positive stool   • Rectal bleeding               Plan:            ICD-10-CM ICD-9-CM   1. Palpitations  R00.2 785.1   2. Essential hypertension  I10 401.9   3. Mixed hyperlipidemia  E78.2 272.2     1. Palpitations  Under control    2. Essential hypertension  Blood pressure under control    3. Mixed hyperlipidemia  Continue current treatment       Specificity and sensitivity of the stress test/ cardiac workup has been explained. Pt has been explained if  Symptoms continue please go to ER, and further w/p will be required.    Also explained this does not rule out coronary artery disease or the future events, continue to emphasize on risk reductions for coronary artery disease    Pt also advised to contact PCP for other causes of symptoms    Ok for dental w/p  1 yr  COUNSELING:    Myriam Leyva was given to patient for the following topics: diagnostic results, risk factor reductions, impressions, risks and benefits of treatment options and importance of treatment compliance .       SMOKING COUNSELING:    [unfilled]    Dictated using Dragon dictation

## 2022-04-12 ENCOUNTER — TELEPHONE (OUTPATIENT)
Dept: CARDIOLOGY | Facility: CLINIC | Age: 52
End: 2022-04-12

## 2022-04-12 NOTE — TELEPHONE ENCOUNTER
----- Message from Lesa Mcfadden sent at 4/11/2022 12:21 PM EDT -----  Regarding: clearance  Sent over clearance letter on friday. Pt scheduled on 4/14 for procedure.    Extractions    Fax: [phone]    [facility]    Per Dr. BARAJAS From a cardiac stand point patient is cleared for surgery.     Letter sent

## 2022-04-21 ENCOUNTER — HOSPITAL ENCOUNTER (OUTPATIENT)
Dept: MRI IMAGING | Facility: HOSPITAL | Age: 52
Discharge: HOME | End: 2022-04-21
Admitting: FAMILY MEDICINE

## 2022-04-21 DIAGNOSIS — R51.9 NEW ONSET HEADACHE: ICD-10-CM

## 2022-04-21 PROCEDURE — 70553 MRI BRAIN STEM W/O & W/DYE: CPT

## 2022-04-21 PROCEDURE — A9577 INJ MULTIHANCE: HCPCS | Performed by: FAMILY MEDICINE

## 2022-04-21 PROCEDURE — 82565 ASSAY OF CREATININE: CPT

## 2022-04-21 PROCEDURE — 0 GADOBENATE DIMEGLUMINE 529 MG/ML SOLUTION: Performed by: FAMILY MEDICINE

## 2022-04-21 RX ADMIN — GADOBENATE DIMEGLUMINE 15 ML: 529 INJECTION, SOLUTION INTRAVENOUS at 22:10

## 2022-04-23 LAB — CREAT BLDA-MCNC: 0.8 MG/DL (ref 0.6–1.3)

## 2022-04-24 NOTE — PROGRESS NOTES
Call patient regarding imaging study.  MRI brain negative for masses/strokes;  Has some fluid in her mastoids (bone behind ears).  Does she have any pain in this location?

## 2022-04-27 ENCOUNTER — HOSPITAL ENCOUNTER (OUTPATIENT)
Dept: MAMMOGRAPHY | Facility: HOSPITAL | Age: 52
End: 2022-04-27

## 2022-05-02 ENCOUNTER — OFFICE VISIT (OUTPATIENT)
Dept: FAMILY MEDICINE CLINIC | Facility: CLINIC | Age: 52
End: 2022-05-02

## 2022-05-02 VITALS
BODY MASS INDEX: 28 KG/M2 | RESPIRATION RATE: 20 BRPM | TEMPERATURE: 96 F | OXYGEN SATURATION: 100 % | WEIGHT: 164 LBS | HEART RATE: 66 BPM | HEIGHT: 64 IN | SYSTOLIC BLOOD PRESSURE: 130 MMHG | DIASTOLIC BLOOD PRESSURE: 80 MMHG

## 2022-05-02 DIAGNOSIS — E78.5 DYSLIPIDEMIA: ICD-10-CM

## 2022-05-02 DIAGNOSIS — F17.210 CIGARETTE NICOTINE DEPENDENCE WITHOUT COMPLICATION: ICD-10-CM

## 2022-05-02 DIAGNOSIS — I10 ESSENTIAL HYPERTENSION: Primary | ICD-10-CM

## 2022-05-02 DIAGNOSIS — R73.9 HYPERGLYCEMIA: ICD-10-CM

## 2022-05-02 PROCEDURE — 99213 OFFICE O/P EST LOW 20 MIN: CPT | Performed by: FAMILY MEDICINE

## 2022-05-02 NOTE — PROGRESS NOTES
Subjective   Myriam Thomas is a 51 y.o. female. Presents today for   Chief Complaint   Patient presents with   • Hypertension   • Results     Review mri       Hypertension  This is a chronic problem. The current episode started more than 1 year ago. The problem is unchanged. The problem is controlled. Pertinent negatives include no chest pain, orthopnea, palpitations, peripheral edema, PND or shortness of breath. The current treatment provides moderate improvement.   no pain over mastoids    Review of Systems   Respiratory: Negative for shortness of breath.    Cardiovascular: Negative for chest pain, palpitations, orthopnea and PND.       Patient Active Problem List   Diagnosis   • Mitral valve insufficiency   • Palpitations   • Essential hypertension   • Tobacco use   • Anxiety   • Mixed hyperlipidemia   • B12 deficiency   • Smoker unmotivated to quit   • IUD (intrauterine device) in place   • Heme positive stool   • Rectal bleeding       Social History     Socioeconomic History   • Marital status:      Spouse name: Roddy   • Number of children: 3   • Years of education: 12   Tobacco Use   • Smoking status: Current Every Day Smoker     Packs/day: 1.00     Years: 26.00     Pack years: 26.00     Types: Cigarettes     Start date: 1/1/1992   • Smokeless tobacco: Never Used   Vaping Use   • Vaping Use: Never used   Substance and Sexual Activity   • Alcohol use: Yes     Comment: RARELY   • Drug use: No   • Sexual activity: Not Currently     Partners: Male     Birth control/protection: I.U.D.       Allergies   Allergen Reactions   • Hydrocodone-Acetaminophen Itching       Current Outpatient Medications on File Prior to Visit   Medication Sig Dispense Refill   • amLODIPine (NORVASC) 5 MG tablet Take 1 tablet by mouth Daily. 30 tablet 5   • bisoprolol-hydrochlorothiazide (Ziac) 10-6.25 MG per tablet Take 1 tablet by mouth Daily. 90 tablet 3   • busPIRone (BUSPAR) 10 MG tablet Take 1 tablet by mouth 2 (two) times a  "day. 180 tablet 3   • FLUoxetine (PROzac) 20 MG capsule Take 1 capsule by mouth Daily. 90 capsule 3   • olmesartan-hydrochlorothiazide (BENICAR HCT) 20-12.5 MG per tablet Take 1 tablet by mouth Daily. 90 tablet 3   • vitamin B-12 (CYANOCOBALAMIN) 1000 MCG tablet Take 1,000 mcg by mouth Daily.       No current facility-administered medications on file prior to visit.       Objective   Vitals:    05/02/22 1350 05/02/22 1405   BP: 148/82 130/80   BP Location: Right arm    Patient Position: Sitting    Cuff Size: Adult    Pulse: 66    Resp: 20    Temp: 96 °F (35.6 °C)    TempSrc: Temporal    SpO2: 100%    Weight: 74.4 kg (164 lb)    Height: 162.6 cm (64.02\")    PainSc: 0-No pain      Body mass index is 28.14 kg/m².    Physical Exam  Vitals and nursing note reviewed.   Constitutional:       Appearance: She is well-developed.   HENT:      Head: Normocephalic and atraumatic.      Right Ear: No mastoid tenderness.      Left Ear: No mastoid tenderness.   Neck:      Thyroid: No thyromegaly.      Vascular: No JVD.   Cardiovascular:      Rate and Rhythm: Normal rate and regular rhythm.      Heart sounds: Normal heart sounds. No murmur heard.    No friction rub. No gallop.   Pulmonary:      Effort: Pulmonary effort is normal. No respiratory distress.      Breath sounds: Normal breath sounds. No wheezing or rales.   Abdominal:      General: Bowel sounds are normal. There is no distension.      Palpations: Abdomen is soft.      Tenderness: There is no abdominal tenderness. There is no guarding or rebound.   Musculoskeletal:      Cervical back: Neck supple.   Skin:     General: Skin is warm and dry.   Neurological:      Mental Status: She is alert.   Psychiatric:         Behavior: Behavior normal.       Study Result    Narrative & Impression   MRI OF THE BRAIN WITH AND WITHOUT CONTRAST 04/21/2022     CLINICAL HISTORY: New onset headache, worsening severe headaches.     TECHNIQUE: Axial T1, FLAIR, fat-suppressed T2, axial diffusion " and  gradient echo T2 sagittal T1 and postcontrast axial fat-suppressed T1  and coronal T1-weighted images were obtained of the entire head.     COMPARISON: There are no prior studies from [facility]  for comparison.     FINDINGS: The brain parenchyma is normal in signal intensity. The  ventricles are normal in size. I see no mass effect and no midline shift  and no extra-axial fluid collections are identified. No abnormal areas  of enhancement are seen in the head. The paranasal sinuses are clear.  There is partial opacification of the mastoid air cells with fluid  bilaterally. Good flow voids are demonstrated within the cerebral  vessels and in the dural venous sinuses. Calvarium and skull base  demonstrate normal marrow signal intensity. The orbits are unremarkable.     IMPRESSION:     1. There is partial opacification of the mastoid air cells with fluid  bilaterally. Otherwise, MRI of the brain is normal. The etiology of the  worsening headaches is not established on this exam.     This report was finalized on 4/22/2022 7:02 AM by Dr. Lizandro Monroy M.D.          Assessment/Plan   Diagnoses and all orders for this visit:    1. Essential hypertension (Primary)    -hypertension - controlled, continue medications  MRI ok, reports headaches better and just mild  No signs mastoiditis on exam         -Follow up: 6 months and prn

## 2022-05-14 LAB
ALBUMIN SERPL-MCNC: 4.6 G/DL (ref 3.8–4.8)
ALBUMIN/GLOB SERPL: 2.3 {RATIO} (ref 1.2–2.2)
ALP SERPL-CCNC: 77 IU/L (ref 44–121)
ALT SERPL-CCNC: 24 IU/L (ref 0–32)
AST SERPL-CCNC: 20 IU/L (ref 0–40)
BILIRUB SERPL-MCNC: 0.2 MG/DL (ref 0–1.2)
BUN SERPL-MCNC: 10 MG/DL (ref 6–24)
BUN/CREAT SERPL: 10 (ref 9–23)
CALCIUM SERPL-MCNC: 9.9 MG/DL (ref 8.7–10.2)
CHLORIDE SERPL-SCNC: 101 MMOL/L (ref 96–106)
CHOLEST SERPL-MCNC: 226 MG/DL (ref 100–199)
CO2 SERPL-SCNC: 25 MMOL/L (ref 20–29)
COTININE UR QL SCN: POSITIVE NG/ML
CREAT SERPL-MCNC: 0.96 MG/DL (ref 0.57–1)
EGFRCR SERPLBLD CKD-EPI 2021: 73 ML/MIN/1.73
GLOBULIN SER CALC-MCNC: 2 G/DL (ref 1.5–4.5)
GLUCOSE SERPL-MCNC: 150 MG/DL (ref 65–99)
HBA1C MFR BLD: 5.7 % (ref 4.8–5.6)
HDLC SERPL-MCNC: 22 MG/DL
LDLC SERPL CALC-MCNC: 93 MG/DL (ref 0–99)
Lab: ABNORMAL
POTASSIUM SERPL-SCNC: 4.6 MMOL/L (ref 3.5–5.2)
PROT SERPL-MCNC: 6.6 G/DL (ref 6–8.5)
SODIUM SERPL-SCNC: 144 MMOL/L (ref 134–144)
TRIGL SERPL-MCNC: 663 MG/DL (ref 0–149)
VLDLC SERPL CALC-MCNC: 111 MG/DL (ref 5–40)

## 2022-05-21 NOTE — PROGRESS NOTES
Call results to patient.  Triglycerides are very high, we need to start a medication.  Start atorvastatin 40mg po daily  Kidney and liver function normal  A1C in prediabetes range, work on diet and exercise

## 2022-05-23 DIAGNOSIS — E78.2 MIXED HYPERLIPIDEMIA: Primary | ICD-10-CM

## 2022-05-23 RX ORDER — ATORVASTATIN CALCIUM 40 MG/1
40 TABLET, FILM COATED ORAL DAILY
Qty: 90 TABLET | Refills: 0 | Status: SHIPPED | OUTPATIENT
Start: 2022-05-23 | End: 2022-09-02

## 2022-05-23 RX ORDER — ATORVASTATIN CALCIUM 40 MG/1
40 TABLET, FILM COATED ORAL DAILY
COMMUNITY
End: 2022-05-23 | Stop reason: SDUPTHER

## 2022-06-20 ENCOUNTER — TELEPHONE (OUTPATIENT)
Dept: FAMILY MEDICINE CLINIC | Facility: CLINIC | Age: 52
End: 2022-06-20

## 2022-06-20 NOTE — TELEPHONE ENCOUNTER
----- Message from Analia Acevedo MA sent at 6/20/2022  3:43 PM EDT -----  Regarding: FW: Throat and Ear Pain    ----- Message -----  From: Myriam Thomas  Sent: 6/20/2022   3:38 PM EDT  To: [facility]  Subject: Throat and Ear Pain                              Good afternoon,  I have had slight pain in my neck area since Saturday.  Today my throat and ear have been having pain, at times more painful than other times throughout today.  I am wanting to schedule a video visit if possible.  If not please contact me at [phone] to schedule an in office visit.

## 2022-06-23 ENCOUNTER — TELEMEDICINE (OUTPATIENT)
Dept: FAMILY MEDICINE CLINIC | Facility: CLINIC | Age: 52
End: 2022-06-23

## 2022-06-23 DIAGNOSIS — H92.02 OTALGIA OF LEFT EAR: ICD-10-CM

## 2022-06-23 DIAGNOSIS — J01.00 ACUTE NON-RECURRENT MAXILLARY SINUSITIS: ICD-10-CM

## 2022-06-23 DIAGNOSIS — E01.0 THYROMEGALY: Primary | ICD-10-CM

## 2022-06-23 PROCEDURE — 99214 OFFICE O/P EST MOD 30 MIN: CPT | Performed by: FAMILY MEDICINE

## 2022-06-23 RX ORDER — AMOXICILLIN AND CLAVULANATE POTASSIUM 875; 125 MG/1; MG/1
1 TABLET, FILM COATED ORAL 2 TIMES DAILY
Qty: 20 TABLET | Refills: 0 | Status: SHIPPED | OUTPATIENT
Start: 2022-06-23 | End: 2022-11-04

## 2022-06-23 RX ORDER — METHYLPREDNISOLONE 4 MG/1
TABLET ORAL
Qty: 21 TABLET | Refills: 0 | Status: SHIPPED | OUTPATIENT
Start: 2022-06-23 | End: 2022-11-04

## 2022-06-23 NOTE — PROGRESS NOTES
Subjective   Myriam Thomas is a 51 y.o. female. Presents today for   Chief Complaint   Patient presents with   • Neck Pain   • Earache   • Sore Throat        Patient VIDEO VISIT, she gave consent to treat.      Neck Pain   This is a new problem. The current episode started in the past 7 days. The problem occurs constantly. The problem has been gradually worsening. The pain is associated with nothing. The pain is present in the left side and right side. The pain is moderate. Associated symptoms include headaches. Pertinent negatives include no fever, numbness or pain with swallowing. She has tried nothing for the symptoms. The treatment provided no relief.   Earache   There is pain in the left ear. This is a new problem. The current episode started in the past 7 days. The problem has been unchanged. Associated symptoms include headaches, neck pain and a sore throat. Pertinent negatives include no coughing. She has tried nothing for the symptoms. The treatment provided no relief.   Sore Throat   This is a new problem. The current episode started in the past 7 days. The problem has been unchanged. The pain is worse on the left side. The pain is mild. Associated symptoms include ear pain, headaches and neck pain. Pertinent negatives include no coughing. She has tried nothing for the symptoms.       On video looks like thyromegally    Review of Systems   Constitutional: Negative for fever.   HENT: Positive for ear pain and sore throat.    Respiratory: Negative for cough.    Musculoskeletal: Positive for neck pain.   Neurological: Positive for headaches. Negative for numbness.       Patient Active Problem List   Diagnosis   • Mitral valve insufficiency   • Palpitations   • Essential hypertension   • Tobacco use   • Anxiety   • Mixed hyperlipidemia   • B12 deficiency   • Smoker unmotivated to quit   • IUD (intrauterine device) in place   • Heme positive stool   • Rectal bleeding       Social History     Socioeconomic  History   • Marital status:      Spouse name: Roddy   • Number of children: 3   • Years of education: 12   Tobacco Use   • Smoking status: Current Every Day Smoker     Packs/day: 1.00     Years: 26.00     Pack years: 26.00     Types: Cigarettes     Start date: 1/1/1992   • Smokeless tobacco: Never Used   Vaping Use   • Vaping Use: Never used   Substance and Sexual Activity   • Alcohol use: Yes     Comment: RARELY   • Drug use: No   • Sexual activity: Not Currently     Partners: Male     Birth control/protection: I.U.D.       Allergies   Allergen Reactions   • Hydrocodone-Acetaminophen Itching       Current Outpatient Medications on File Prior to Visit   Medication Sig Dispense Refill   • atorvastatin (LIPITOR) 40 MG tablet Take 1 tablet by mouth Daily. 90 tablet 0   • amLODIPine (NORVASC) 5 MG tablet Take 1 tablet by mouth Daily. 30 tablet 5   • bisoprolol-hydrochlorothiazide (Ziac) 10-6.25 MG per tablet Take 1 tablet by mouth Daily. 90 tablet 3   • busPIRone (BUSPAR) 10 MG tablet Take 1 tablet by mouth 2 (two) times a day. 180 tablet 3   • FLUoxetine (PROzac) 20 MG capsule Take 1 capsule by mouth Daily. 90 capsule 3   • olmesartan-hydrochlorothiazide (BENICAR HCT) 20-12.5 MG per tablet Take 1 tablet by mouth Daily. 90 tablet 3   • vitamin B-12 (CYANOCOBALAMIN) 1000 MCG tablet Take 1,000 mcg by mouth Daily.       No current facility-administered medications on file prior to visit.       Objective   There were no vitals filed for this visit.  There is no height or weight on file to calculate BMI.    Physical Exam  Vitals and nursing note reviewed.   Constitutional:       Appearance: She is well-developed.   Neck:      Thyroid: Thyromegaly present.   Neurological:      Mental Status: She is alert.   Psychiatric:         Behavior: Behavior normal.         Assessment & Plan   Diagnoses and all orders for this visit:    1. Thyromegaly (Primary)  -     US Thyroid  -     T3, Free  -     T4, Free  -     Thyroid  Antibodies  -     TSH  -     Thyrotropin Receptor Antibody  -     Thyroid Peroxidase Antibody    2. Otalgia of left ear  -     amoxicillin-clavulanate (Augmentin) 875-125 MG per tablet; Take 1 tablet by mouth 2 (Two) Times a Day.  Dispense: 20 tablet; Refill: 0  -     methylPREDNISolone (MEDROL) 4 MG dose pack; Take as directed on package instructions.  Dispense: 21 tablet; Refill: 0    3. Acute non-recurrent maxillary sinusitis  -     amoxicillin-clavulanate (Augmentin) 875-125 MG per tablet; Take 1 tablet by mouth 2 (Two) Times a Day.  Dispense: 20 tablet; Refill: 0  -     methylPREDNISolone (MEDROL) 4 MG dose pack; Take as directed on package instructions.  Dispense: 21 tablet; Refill: 0    -will check thyroid us as can see what appears to be thyromegally on video, will check labs as well;  Stop in for labs  -start abx and medrol pack for ear/sinuses;  Has dentures and no dentition so not likely pain due to this         -Follow up: arrange after imaging and labs

## 2022-07-02 LAB
T3FREE SERPL-MCNC: 3.3 PG/ML (ref 2–4.4)
T4 FREE SERPL-MCNC: 1.28 NG/DL (ref 0.82–1.77)
THYROGLOB AB SERPL-ACNC: <1 IU/ML (ref 0–0.9)
THYROPEROXIDASE AB SERPL-ACNC: <8 IU/ML (ref 0–34)
TSH RECEP AB SER-ACNC: <1.1 IU/L (ref 0–1.75)
TSH SERPL DL<=0.005 MIU/L-ACNC: 1.46 UIU/ML (ref 0.45–4.5)

## 2022-09-02 DIAGNOSIS — F41.1 GAD (GENERALIZED ANXIETY DISORDER): ICD-10-CM

## 2022-09-02 DIAGNOSIS — I10 ESSENTIAL HYPERTENSION: ICD-10-CM

## 2022-09-02 DIAGNOSIS — E78.2 MIXED HYPERLIPIDEMIA: ICD-10-CM

## 2022-09-02 RX ORDER — FLUOXETINE HYDROCHLORIDE 20 MG/1
CAPSULE ORAL
Qty: 90 CAPSULE | Refills: 3 | Status: SHIPPED | OUTPATIENT
Start: 2022-09-02

## 2022-09-02 RX ORDER — OLMESARTAN MEDOXOMIL AND HYDROCHLOROTHIAZIDE 20/12.5 20; 12.5 MG/1; MG/1
TABLET ORAL
Qty: 90 TABLET | Refills: 3 | Status: SHIPPED | OUTPATIENT
Start: 2022-09-02

## 2022-09-02 RX ORDER — BISOPROLOL FUMARATE AND HYDROCHLOROTHIAZIDE 10; 6.25 MG/1; MG/1
TABLET ORAL
Qty: 90 TABLET | Refills: 3 | Status: SHIPPED | OUTPATIENT
Start: 2022-09-02

## 2022-09-02 RX ORDER — ATORVASTATIN CALCIUM 40 MG/1
TABLET, FILM COATED ORAL
Qty: 90 TABLET | Refills: 0 | Status: SHIPPED | OUTPATIENT
Start: 2022-09-02 | End: 2022-12-27

## 2022-09-21 ENCOUNTER — HOSPITAL ENCOUNTER (OUTPATIENT)
Dept: ULTRASOUND IMAGING | Facility: HOSPITAL | Age: 52
Discharge: HOME | End: 2022-09-21

## 2022-09-21 ENCOUNTER — HOSPITAL ENCOUNTER (OUTPATIENT)
Dept: MAMMOGRAPHY | Facility: HOSPITAL | Age: 52
Discharge: HOME | End: 2022-09-21

## 2022-09-21 DIAGNOSIS — Z12.31 SCREENING MAMMOGRAM, ENCOUNTER FOR: ICD-10-CM

## 2022-09-21 PROCEDURE — 76536 US EXAM OF HEAD AND NECK: CPT

## 2022-09-21 PROCEDURE — 77067 SCR MAMMO BI INCL CAD: CPT

## 2022-09-21 PROCEDURE — 77063 BREAST TOMOSYNTHESIS BI: CPT

## 2022-09-24 NOTE — PROGRESS NOTES
Dear Myriam Thomas:    Your mammogram was normal.  We have enclosed a copy.    Sincerely,  TATUM Barahona DO, MS

## 2022-10-25 ENCOUNTER — DOCUMENTATION (OUTPATIENT)
Dept: GASTROENTEROLOGY | Facility: CLINIC | Age: 52
End: 2022-10-25

## 2022-11-04 ENCOUNTER — OFFICE VISIT (OUTPATIENT)
Dept: FAMILY MEDICINE CLINIC | Facility: CLINIC | Age: 52
End: 2022-11-04

## 2022-11-04 VITALS
WEIGHT: 172 LBS | TEMPERATURE: 96.7 F | DIASTOLIC BLOOD PRESSURE: 68 MMHG | BODY MASS INDEX: 29.37 KG/M2 | RESPIRATION RATE: 18 BRPM | HEART RATE: 60 BPM | SYSTOLIC BLOOD PRESSURE: 134 MMHG | OXYGEN SATURATION: 97 % | HEIGHT: 64 IN

## 2022-11-04 DIAGNOSIS — R73.03 PREDIABETES: ICD-10-CM

## 2022-11-04 DIAGNOSIS — Z78.0 MENOPAUSE: ICD-10-CM

## 2022-11-04 DIAGNOSIS — Z79.899 DRUG THERAPY: ICD-10-CM

## 2022-11-04 DIAGNOSIS — R23.2 HOT FLASHES: ICD-10-CM

## 2022-11-04 DIAGNOSIS — E66.3 OVERWEIGHT: ICD-10-CM

## 2022-11-04 DIAGNOSIS — Z00.00 WELLNESS EXAMINATION: Primary | ICD-10-CM

## 2022-11-04 DIAGNOSIS — I10 ESSENTIAL HYPERTENSION: ICD-10-CM

## 2022-11-04 DIAGNOSIS — E78.2 MIXED HYPERLIPIDEMIA: ICD-10-CM

## 2022-11-04 PROCEDURE — 99396 PREV VISIT EST AGE 40-64: CPT | Performed by: FAMILY MEDICINE

## 2022-11-04 NOTE — PROGRESS NOTES
Subjective   Myriam Thomas is a 52 y.o. female. Presents today for   Chief Complaint   Patient presents with   • Hypertension   • Annual Exam     Yearly wellness       History of Present Illness  Patient 51 y/o female here for wellness exam;   Reports has mirena, no cycle while on except when changes out.  Last cycle 3 years ago.  However having hot flashes/night sweats now, wonder if menopause;   Sees Dr. Banks for well woman exams, has up coming appt.  ? Soft tissue mass left flank, notes off/on;    Review of Systems    Patient Active Problem List   Diagnosis   • Mitral valve insufficiency   • Palpitations   • Essential hypertension   • Tobacco use   • Anxiety   • Mixed hyperlipidemia   • B12 deficiency   • Smoker unmotivated to quit   • IUD (intrauterine device) in place   • Heme positive stool   • Rectal bleeding       Social History     Socioeconomic History   • Marital status:      Spouse name: Roddy   • Number of children: 3   • Years of education: 12   Tobacco Use   • Smoking status: Every Day     Packs/day: 1.00     Years: 26.00     Pack years: 26.00     Types: Cigarettes     Start date: 1/1/1992   • Smokeless tobacco: Never   Vaping Use   • Vaping Use: Never used   Substance and Sexual Activity   • Alcohol use: Yes     Comment: RARELY   • Drug use: No   • Sexual activity: Not Currently     Partners: Male     Birth control/protection: I.U.D.       Allergies   Allergen Reactions   • Hydrocodone-Acetaminophen Itching       Current Outpatient Medications on File Prior to Visit   Medication Sig Dispense Refill   • amLODIPine (NORVASC) 5 MG tablet Take 1 tablet by mouth Daily. 30 tablet 5   • atorvastatin (LIPITOR) 40 MG tablet TAKE ONE TABLET BY MOUTH DAILY 90 tablet 0   • bisoprolol-hydrochlorothiazide (ZIAC) 10-6.25 MG per tablet TAKE ONE TABLET BY MOUTH DAILY 90 tablet 3   • busPIRone (BUSPAR) 10 MG tablet Take 1 tablet by mouth 2 (two) times a day. 180 tablet 3   • FLUoxetine (PROzac) 20 MG capsule  "TAKE ONE CAPSULE BY MOUTH DAILY 90 capsule 3   • olmesartan-hydrochlorothiazide (BENICAR HCT) 20-12.5 MG per tablet TAKE ONE TABLET BY MOUTH DAILY 90 tablet 3   • [DISCONTINUED] amoxicillin-clavulanate (Augmentin) 875-125 MG per tablet Take 1 tablet by mouth 2 (Two) Times a Day. 20 tablet 0   • [DISCONTINUED] methylPREDNISolone (MEDROL) 4 MG dose pack Take as directed on package instructions. 21 tablet 0   • [DISCONTINUED] vitamin B-12 (CYANOCOBALAMIN) 1000 MCG tablet Take 1,000 mcg by mouth Daily.       No current facility-administered medications on file prior to visit.       Objective   Vitals:    11/04/22 0847   BP: 134/68   Pulse: 60   Resp: 18   Temp: 96.7 °F (35.9 °C)   TempSrc: Temporal   SpO2: 97%   Weight: 78 kg (172 lb)   Height: 162.6 cm (64.02\")   PainSc: 0-No pain     Body mass index is 29.51 kg/m².    Physical Exam  Vitals and nursing note reviewed.   Constitutional:       Appearance: She is well-developed.   HENT:      Head: Normocephalic and atraumatic.   Neck:      Thyroid: No thyromegaly.      Vascular: No JVD.   Cardiovascular:      Rate and Rhythm: Normal rate and regular rhythm.      Heart sounds: Normal heart sounds. No murmur heard.    No friction rub. No gallop.   Pulmonary:      Effort: Pulmonary effort is normal. No respiratory distress.      Breath sounds: Normal breath sounds. No wheezing or rales.   Abdominal:      General: Bowel sounds are normal. There is no distension.      Palpations: Abdomen is soft.      Tenderness: There is no abdominal tenderness. There is no guarding or rebound.      Comments: Left flank soft rubbery area but not discrete mass, ? lipoma   Musculoskeletal:      Cervical back: Neck supple.   Skin:     General: Skin is warm and dry.   Neurological:      Mental Status: She is alert.   Psychiatric:         Behavior: Behavior normal.         Assessment & Plan   Diagnoses and all orders for this visit:    1. Wellness examination (Primary)    2. Essential " hypertension  -     Comprehensive Metabolic Panel    3. Mixed hyperlipidemia  -     Comprehensive Metabolic Panel  -     Lipid Panel    4. Overweight    5. Prediabetes  -     Hemoglobin A1c    6. Menopause  -     FSH & LH    7. Hot flashes  -     FSH & LH    8. Drug therapy  -     Nicotine Screen, Urine - Urine, Clean Catch    counseled on diet and exercise  Htn, hld doing well and stable;    Due nicotine screen  Due dm2 scren  [employer] paperwork, just drop off  Highly recommend quit smoking, not quite ready;         -Follow up: 6 months and prn

## 2022-11-04 NOTE — PATIENT INSTRUCTIONS
"\"7-2-2-Almost None!\"  Healthy Habits Start Early    EAT 5 OR MORE SERVINGS OF VEGETABLES AND FRUITS EVERY DAY.    Help Myriam get three vegetables and two fruits each day. Red, green, yellow, orange...encourage them to try all the colors so they can enjoy different flavors and get more vitamins.    How can I help Myriam do this?  ---------------------------------------------  -BE PATIENT WITH Myriam, remember it may take 10 times before they start to like new food. So, start with small bites and just keep trying.  -Serve at least one vegetable or fruit at every meal. Even try two. Remember, portions do not have to be as big as you think.  -Encourage eating fruits and vegetables instead of drinking them..it's a better way to get fiber and vitamins..so limit the amount of juice to 1/2 cup per day for children 1-6 years and one cup per day for children 7-18 years of age. Try using 1/2 part water and 1/2 part juice.    Spend less than two hours per day watching television and other screen media. Screen media includes video games, movies and computer use for entertainment.    How can I help Myriam do this?  -Turn off the TV at dinner. Dinner is the best time to hang out with your kids and just talk, learn about their day, and tell them about your day. Your kids have a lot to learn from you and dinner is a great time to share.  "

## 2022-11-05 LAB
ALBUMIN SERPL-MCNC: 4.7 G/DL (ref 3.5–5.2)
ALBUMIN/GLOB SERPL: 2.8 G/DL
ALP SERPL-CCNC: 79 U/L (ref 39–117)
ALT SERPL-CCNC: 35 U/L (ref 1–33)
AST SERPL-CCNC: 29 U/L (ref 1–32)
BILIRUB SERPL-MCNC: 0.3 MG/DL (ref 0–1.2)
BUN SERPL-MCNC: 7 MG/DL (ref 6–20)
BUN/CREAT SERPL: 9 (ref 7–25)
CALCIUM SERPL-MCNC: 9.3 MG/DL (ref 8.6–10.5)
CHLORIDE SERPL-SCNC: 103 MMOL/L (ref 98–107)
CHOLEST SERPL-MCNC: 142 MG/DL (ref 0–200)
CO2 SERPL-SCNC: 28.4 MMOL/L (ref 22–29)
COTININE UR QL SCN: POSITIVE NG/ML
CREAT SERPL-MCNC: 0.78 MG/DL (ref 0.57–1)
EGFRCR SERPLBLD CKD-EPI 2021: 92.7 ML/MIN/1.73
FSH SERPL-ACNC: 8.5 MIU/ML
GLOBULIN SER CALC-MCNC: 1.7 GM/DL
GLUCOSE SERPL-MCNC: 87 MG/DL (ref 65–99)
HBA1C MFR BLD: 5.9 % (ref 4.8–5.6)
HDLC SERPL-MCNC: 30 MG/DL (ref 40–60)
LDLC SERPL CALC-MCNC: 70 MG/DL (ref 0–100)
LH SERPL-ACNC: 10.5 MIU/ML
Lab: ABNORMAL
POTASSIUM SERPL-SCNC: 4.4 MMOL/L (ref 3.5–5.2)
PROT SERPL-MCNC: 6.4 G/DL (ref 6–8.5)
SODIUM SERPL-SCNC: 141 MMOL/L (ref 136–145)
TRIGL SERPL-MCNC: 256 MG/DL (ref 0–150)
VLDLC SERPL CALC-MCNC: 42 MG/DL (ref 5–40)

## 2022-11-07 NOTE — PROGRESS NOTES
Call results to patient.  Cholesterol controlled, triglycerides high, work on diet.  Hormone labs not in menopause range yet.  A1C prediabetes range work on diet

## 2022-11-14 RX ORDER — BUSPIRONE HYDROCHLORIDE 10 MG/1
TABLET ORAL
Qty: 180 TABLET | Refills: 3 | Status: SHIPPED | OUTPATIENT
Start: 2022-11-14

## 2022-11-17 ENCOUNTER — CLINICAL SUPPORT (OUTPATIENT)
Dept: FAMILY MEDICINE CLINIC | Facility: CLINIC | Age: 52
End: 2022-11-17

## 2022-11-17 DIAGNOSIS — G44.83 COUGH HEADACHE: Primary | ICD-10-CM

## 2022-11-17 LAB
EXPIRATION DATE: NORMAL
FLUAV AG UPPER RESP QL IA.RAPID: NOT DETECTED
FLUBV AG UPPER RESP QL IA.RAPID: NOT DETECTED
INTERNAL CONTROL: NORMAL
Lab: NORMAL
SARS-COV-2 AG UPPER RESP QL IA.RAPID: NOT DETECTED

## 2022-11-17 PROCEDURE — 87428 SARSCOV & INF VIR A&B AG IA: CPT | Performed by: FAMILY MEDICINE

## 2022-12-02 ENCOUNTER — PRE-PROCEDURE SCREENING (OUTPATIENT)
Dept: GASTROENTEROLOGY | Facility: CLINIC | Age: 52
End: 2022-12-02

## 2022-12-02 NOTE — TELEPHONE ENCOUNTER
Last scope 8/21 in epic --Personal history of polyps--Family history of polyps and  colon cancer--No blood thinners--Medications:                  amLODIPine (NORVASC) 5 MG tablet  atorvastatin (LIPITOR) 40 MG tablet  bisoprolol-hydrochlorothiazide (ZIAC) 10-6.25 MG per tablet  busPIRone (BUSPAR) 10 MG tablet  FLUoxetine (PROzac) 20 MG capsule  olmesartan-hydrochlorothiazide (BENICAR HCT) 20-12.5 MG per tablet          QUESTIONNAIRE SCEEENING & HAS BEEN SENT TO DOCTOR FOR REVIEW

## 2022-12-20 ENCOUNTER — PREP FOR SURGERY (OUTPATIENT)
Dept: OTHER | Facility: HOSPITAL | Age: 52
End: 2022-12-20

## 2022-12-20 DIAGNOSIS — Z80.0 FAMILY HISTORY OF COLON CANCER: ICD-10-CM

## 2022-12-20 DIAGNOSIS — K63.5 COLON POLYP: Primary | ICD-10-CM

## 2022-12-26 DIAGNOSIS — E78.2 MIXED HYPERLIPIDEMIA: ICD-10-CM

## 2022-12-27 RX ORDER — ATORVASTATIN CALCIUM 40 MG/1
TABLET, FILM COATED ORAL
Qty: 90 TABLET | Refills: 3 | Status: SHIPPED | OUTPATIENT
Start: 2022-12-27

## 2023-02-02 ENCOUNTER — OFFICE VISIT (OUTPATIENT)
Dept: OBSTETRICS AND GYNECOLOGY | Facility: CLINIC | Age: 53
End: 2023-02-02
Payer: COMMERCIAL

## 2023-02-02 VITALS
WEIGHT: 167 LBS | SYSTOLIC BLOOD PRESSURE: 114 MMHG | BODY MASS INDEX: 28.51 KG/M2 | DIASTOLIC BLOOD PRESSURE: 77 MMHG | HEIGHT: 64 IN

## 2023-02-02 DIAGNOSIS — Z01.419 ENCOUNTER FOR GYNECOLOGICAL EXAMINATION WITHOUT ABNORMAL FINDING: Primary | ICD-10-CM

## 2023-02-02 DIAGNOSIS — N95.1 PERIMENOPAUSAL: ICD-10-CM

## 2023-02-02 PROCEDURE — 99396 PREV VISIT EST AGE 40-64: CPT | Performed by: OBSTETRICS & GYNECOLOGY

## 2023-02-02 NOTE — PROGRESS NOTES
Subjective    Chief Complaint   Patient presents with   • Gynecologic Exam     AE, Discuss Mirena removal and pre menopause       History of Present Illness    Myriam Thomas is a 52 y.o. female who presents for annual exam. Smoker.  Mammogram 2022 normal.  Colonoscopy . Dr Rosen wants to do another one soon so patient will contact his office.  She did have an FSH a few months ago which was premenopausal.  She is having no problems with her Mirena and no periods and after discussion she has decided to keep it for birth control since she is probably not postmenopausal.  She would like a DEXA scan scheduled as she has never had 1.  We discussed smoking cessation but due to other issues she does not want any aids at this time.    Obstetric History:  OB History        4    Para   4    Term   3       1    AB        Living   3       SAB        IAB        Ectopic        Molar        Multiple        Live Births   3               Menstrual History:     No LMP recorded. Patient has had an implant.       Past Medical History:   Diagnosis Date   • Anxiety    • Depression    • HTN (hypertension)    • Hyperlipidemia    • Mitral regurgitation    • Palpitations    • Renal calculi      Family History   Problem Relation Age of Onset   • Hypertension Mother    • Heart attack Mother    • Diabetes Mother    • Heart disease Father         unsure full hx.   • Cancer Brother    • Breast cancer Maternal Grandmother         50s recurrence in her late 60s   • Colon cancer Maternal Grandmother         70s y/o   • Hypertension Maternal Grandmother    • Alzheimer's disease Maternal Grandmother    • Hypertension Maternal Grandfather    • Emphysema Maternal Grandfather    • Diabetes Paternal Grandmother    • No Known Problems Paternal Grandfather      Social History     Tobacco Use   Smoking Status Every Day   • Packs/day: 1.00   • Years: 26.00   • Pack years: 26.00   • Types: Cigarettes   • Start date: 1992    Smokeless Tobacco Never   Myriam Thomas  reports that she has been smoking cigarettes. She started smoking about 31 years ago. She has a 26.00 pack-year smoking history. She has never used smokeless tobacco.. I have educated her on the risk of diseases from using tobacco products ..     I advised her to quit and she is not willing to quit.                 The following portions of the patient's history were reviewed and updated as appropriate: allergies, current medications, past family history, past medical history, past social history, past surgical history and problem list.    Review of Systems   Constitutional: Negative.  Negative for fever and unexpected weight change.   HENT: Negative.    Respiratory: Negative for shortness of breath and wheezing.    Cardiovascular: Negative for chest pain, palpitations and leg swelling.   Gastrointestinal: Negative for abdominal pain, anal bleeding and blood in stool.   Genitourinary: Negative for dysuria, pelvic pain, urgency, vaginal bleeding, vaginal discharge and vaginal pain.   Skin: Negative.    Neurological: Negative.    Hematological: Negative.  Negative for adenopathy.   Psychiatric/Behavioral: Negative.  Negative for dysphoric mood. The patient is not nervous/anxious.             Objective   Physical Exam  Exam conducted with a chaperone present.   Constitutional:       Appearance: Normal appearance. She is well-developed.   HENT:      Head: Normocephalic.   Neck:      Thyroid: No thyromegaly.      Trachea: Trachea normal. No tracheal deviation.   Cardiovascular:      Rate and Rhythm: Normal rate and regular rhythm.      Heart sounds: Normal heart sounds. No murmur heard.  Pulmonary:      Effort: Pulmonary effort is normal.      Breath sounds: Normal breath sounds.   Chest:   Breasts:     Right: Normal. No mass, nipple discharge or tenderness.      Left: Normal. No mass, nipple discharge or tenderness.   Abdominal:      Palpations: Abdomen is soft. There is no  "mass.      Tenderness: There is no abdominal tenderness.      Hernia: No hernia is present.   Genitourinary:     General: Normal vulva.      Labia:         Right: No tenderness or lesion.         Left: No tenderness or lesion.       Urethra: No prolapse or urethral lesion.      Vagina: Normal. No vaginal discharge or lesions.      Cervix: No cervical motion tenderness.      Uterus: Not enlarged and not tender.       Adnexa:         Right: No mass or tenderness.          Left: No mass or tenderness.        Rectum: Normal. No external hemorrhoid or internal hemorrhoid. Normal anal tone.      Comments: External genitalia normal .  Mirena IUD string easily seen  Lymphadenopathy:      Cervical: No cervical adenopathy.      Upper Body:      Right upper body: No axillary adenopathy.      Left upper body: No axillary adenopathy.   Skin:     General: Skin is warm and dry.      Findings: No rash.   Neurological:      Mental Status: She is alert and oriented to person, place, and time.   Psychiatric:         Behavior: Behavior normal.         /77   Ht 162.6 cm (64.02\")   Wt 75.8 kg (167 lb)   BMI 28.65 kg/m²     Assessment & Plan   Diagnoses and all orders for this visit:    1. Encounter for gynecological examination without abnormal finding (Primary)  -     IGP,rfx Aptima HPV All Pth    2. Perimenopausal  -     DEXA Bone Density Axial; Future        Return to office 1 year.  Colorectal screening up-to-date and getting another colonoscopy this year.  Counseled about screening for osteoporosis and scheduling a DEXA scan.  Discussed her Mirena  situation being premenopausal and patient has decided to keep.it.              "

## 2023-02-08 LAB
CONV .: NORMAL
CYTOLOGIST CVX/VAG CYTO: NORMAL
CYTOLOGY CVX/VAG DOC CYTO: NORMAL
CYTOLOGY CVX/VAG DOC THIN PREP: NORMAL
DX ICD CODE: NORMAL
HIV 1 & 2 AB SER-IMP: NORMAL
OTHER STN SPEC: NORMAL
STAT OF ADQ CVX/VAG CYTO-IMP: NORMAL

## 2023-02-09 ENCOUNTER — TELEPHONE (OUTPATIENT)
Dept: CARDIOLOGY | Facility: CLINIC | Age: 53
End: 2023-02-09

## 2023-02-09 NOTE — TELEPHONE ENCOUNTER
Caller: Myriam Thomas    Relationship: Self    Best call back number: [phone]    What is the best time to reach you: ANY    Who are you requesting to speak with (clinical staff, provider,  specific staff member): ANY    What was the call regarding: PATIENT CALLED TO SCHEDULE HER 1 YEAR FOLLOW UP APPOINTMENT. IT IS SCHEDULED FOR 06.05.23 @ 9:45 AM. IF PATIENT NEEDS TO BE SEEN SOONER PLEASE CONTACT PATIENT WITH THE APPOINTMENT. THANK YOU.     Do you require a callback: YES, IF NEEDS SOONER APPOINTMENT.

## 2023-02-27 ENCOUNTER — TELEPHONE (OUTPATIENT)
Dept: GASTROENTEROLOGY | Facility: CLINIC | Age: 53
End: 2023-02-27

## 2023-02-27 NOTE — TELEPHONE ENCOUNTER
Hub staff attempted to follow warm transfer process and was unsuccessful     Caller: Myriam Thomas    Relationship to patient: Self    Best call back number: [phone]    Patient is needing:  PATIENT NEEDING TO SCHEDULE EGD.  PLEASE REACH OUT TO SCHEDULE. THANK YOU

## 2023-03-11 DIAGNOSIS — I10 ESSENTIAL HYPERTENSION: ICD-10-CM

## 2023-03-13 ENCOUNTER — TELEPHONE (OUTPATIENT)
Dept: GASTROENTEROLOGY | Facility: CLINIC | Age: 53
End: 2023-03-13
Payer: COMMERCIAL

## 2023-03-13 PROBLEM — K63.5 COLON POLYP: Status: ACTIVE | Noted: 2023-03-13

## 2023-03-13 PROBLEM — Z80.0 FAMILY HISTORY OF COLON CANCER: Status: ACTIVE | Noted: 2023-03-13

## 2023-03-13 RX ORDER — AMLODIPINE BESYLATE 5 MG/1
TABLET ORAL
Qty: 30 TABLET | Refills: 5 | Status: SHIPPED | OUTPATIENT
Start: 2023-03-13

## 2023-03-13 NOTE — TELEPHONE ENCOUNTER
----- Message from Myriam Thomas sent at 3/10/2023  9:56 PM EST -----  Regarding: Colonoscopy  Contact: [phone]  I had a scope completed in August of 2021.  I was advised I needed to have it redone in 1 year.  I have been trying to get this scheduled since 09/2023.  Someone had contact me to schedule this procedure on 02/14/2023 and left me a voicemail.  I have since made several attempt to reach your office to schedule this appointment.  I spoke to someone on 02/27/2023 who advised me someone would reach out to me within 48 hours.  Here it is 03/10/2023 and I still have not been contacted.  Please have someone contact me to have this procedure scheduled.

## 2023-03-13 NOTE — TELEPHONE ENCOUNTER
OK FOR HUB TO READ  FERN patient via telephone for COLONOSCOPY. Scheduled 8/4/23 with arrival time of 0730AM. Prep paperwork mailed to verified address on file. Patient advised arrival time may change based on [facility] guidelines. FERN REAGAN

## 2023-05-19 ENCOUNTER — OFFICE VISIT (OUTPATIENT)
Dept: FAMILY MEDICINE CLINIC | Facility: CLINIC | Age: 53
End: 2023-05-19
Payer: COMMERCIAL

## 2023-05-19 VITALS
HEIGHT: 64 IN | OXYGEN SATURATION: 96 % | SYSTOLIC BLOOD PRESSURE: 110 MMHG | DIASTOLIC BLOOD PRESSURE: 62 MMHG | BODY MASS INDEX: 29.37 KG/M2 | RESPIRATION RATE: 18 BRPM | HEART RATE: 92 BPM | WEIGHT: 172 LBS

## 2023-05-19 DIAGNOSIS — E66.3 OVERWEIGHT (BMI 25.0-29.9): ICD-10-CM

## 2023-05-19 DIAGNOSIS — G89.29 CHRONIC LEFT-SIDED LOW BACK PAIN WITH LEFT-SIDED SCIATICA: ICD-10-CM

## 2023-05-19 DIAGNOSIS — I10 ESSENTIAL HYPERTENSION: Primary | ICD-10-CM

## 2023-05-19 DIAGNOSIS — E78.2 MIXED HYPERLIPIDEMIA: ICD-10-CM

## 2023-05-19 DIAGNOSIS — F41.1 GAD (GENERALIZED ANXIETY DISORDER): ICD-10-CM

## 2023-05-19 DIAGNOSIS — M54.42 CHRONIC LEFT-SIDED LOW BACK PAIN WITH LEFT-SIDED SCIATICA: ICD-10-CM

## 2023-05-19 DIAGNOSIS — R73.03 PREDIABETES: ICD-10-CM

## 2023-05-19 DIAGNOSIS — F17.210 CIGARETTE NICOTINE DEPENDENCE WITHOUT COMPLICATION: ICD-10-CM

## 2023-05-19 DIAGNOSIS — Z12.2 ENCOUNTER FOR SCREENING FOR LUNG CANCER: ICD-10-CM

## 2023-05-19 RX ORDER — VARENICLINE TARTRATE 0.5 MG/1
TABLET, FILM COATED ORAL
Qty: 56 TABLET | Refills: 0 | Status: SHIPPED | OUTPATIENT
Start: 2023-05-19

## 2023-05-19 RX ORDER — BACLOFEN 10 MG/1
10 TABLET ORAL 3 TIMES DAILY PRN
Qty: 90 TABLET | Refills: 0 | Status: SHIPPED | OUTPATIENT
Start: 2023-05-19

## 2023-05-19 RX ORDER — VARENICLINE TARTRATE 1 MG/1
1 TABLET, FILM COATED ORAL 2 TIMES DAILY
Qty: 60 TABLET | Refills: 3 | Status: SHIPPED | OUTPATIENT
Start: 2023-05-19

## 2023-05-19 RX ORDER — METHYLPREDNISOLONE 4 MG/1
TABLET ORAL
Qty: 21 TABLET | Refills: 0 | Status: SHIPPED | OUTPATIENT
Start: 2023-05-19

## 2023-05-19 NOTE — PATIENT INSTRUCTIONS
Please review the decision aid used during our discussion regarding the Low dose lung cancer screening visit today.                        For more information:    Quit Now [place]  1-800-QUIT-NOW  https://[place].quitlogix.org/en-[place]/  Steps to Quit Smoking  Smoking tobacco can be harmful to your health and can affect almost every organ in your body. Smoking puts you, and those around you, at risk for developing many serious chronic diseases. Quitting smoking is difficult, but it is one of the best things that you can do for your health. It is never too late to quit.  What are the benefits of quitting smoking?  When you quit smoking, you lower your risk of developing serious diseases and conditions, such as:  Lung cancer or lung disease, such as COPD.  Heart disease.  Stroke.  Heart attack.  Infertility.  Osteoporosis and bone fractures.  Additionally, symptoms such as coughing, wheezing, and shortness of breath may get better when you quit. You may also find that you get sick less often because your body is stronger at fighting off colds and infections. If you are pregnant, quitting smoking can help to reduce your chances of having a baby of low birth weight.  How do I get ready to quit?  When you decide to quit smoking, create a plan to make sure that you are successful. Before you quit:  Pick a date to quit. Set a date within the next two weeks to give you time to prepare.  Write down the reasons why you are quitting. Keep this list in places where you will see it often, such as on your bathroom mirror or in your car or wallet.  Identify the people, places, things, and activities that make you want to smoke (triggers) and avoid them. Make sure to take these actions:  Throw away all cigarettes at home, at work, and in your car.  Throw away smoking accessories, such as ashtrays and lighters.  Clean your car and make sure to empty the ashtray.  Clean your home, including curtains and carpets.  Tell your family,  friends, and coworkers that you are quitting. Support from your loved ones can make quitting easier.  Talk with your health care provider about your options for quitting smoking.  Find out what treatment options are covered by your health insurance.  What strategies can I use to quit smoking?  Talk with your healthcare provider about different strategies to quit smoking. Some strategies include:  Quitting smoking altogether instead of gradually lessening how much you smoke over a period of time. Research shows that quitting “cold turkey” is more successful than gradually quitting.  Attending in-person counseling to help you build problem-solving skills. You are more likely to have success in quitting if you attend several counseling sessions. Even short sessions of 10 minutes can be effective.  Finding resources and support systems that can help you to quit smoking and remain smoke-free after you quit. These resources are most helpful when you use them often. They can include:  Online chats with a counselor.  Telephone quitlines.  Printed self-help materials.  Support groups or group counseling.  Text messaging programs.  Mobile phone applications.  Taking medicines to help you quit smoking. (If you are pregnant or breastfeeding, talk with your health care provider first.) Some medicines contain nicotine and some do not. Both types of medicines help with cravings, but the medicines that include nicotine help to relieve withdrawal symptoms. Your health care provider may recommend:  Nicotine patches, gum, or lozenges.  Nicotine inhalers or sprays.  Non-nicotine medicine that is taken by mouth.  Talk with your health care provider about combining strategies, such as taking medicines while you are also receiving in-person counseling. Using these two strategies together makes you more likely to succeed in quitting than if you used either strategy on its own.  If you are pregnant or breastfeeding, talk with your health  care provider about finding counseling or other support strategies to quit smoking. Do not take medicine to help you quit smoking unless told to do so by your health care provider.  What things can I do to make it easier to quit?  Quitting smoking might feel overwhelming at first, but there is a lot that you can do to make it easier. Take these important actions:  Reach out to your family and friends and ask that they support and encourage you during this time. Call telephone quitlines, reach out to support groups, or work with a counselor for support.  Ask people who smoke to avoid smoking around you.  Avoid places that trigger you to smoke, such as bars, parties, or smoke-break areas at work.  Spend time around people who do not smoke.  Lessen stress in your life, because stress can be a smoking trigger for some people. To lessen stress, try:  Exercising regularly.  Deep-breathing exercises.  Yoga.  Meditating.  Performing a body scan. This involves closing your eyes, scanning your body from head to toe, and noticing which parts of your body are particularly tense. Purposefully relax the muscles in those areas.  Download or purchase mobile phone or tablet apps (applications) that can help you stick to your quit plan by providing reminders, tips, and encouragement. There are many free apps, such as QuitGuide from the CDC (Centers for Disease Control and Prevention). You can find other support for quitting smoking (smoking cessation) through smokefree.gov and other websites.  How will I feel when I quit smoking?  Within the first 24 hours of quitting smoking, you may start to feel some withdrawal symptoms. These symptoms are usually most noticeable 2-3 days after quitting, but they usually do not last beyond 2-3 weeks. Changes or symptoms that you might experience include:  Mood swings.  Restlessness, anxiety, or irritation.  Difficulty concentrating.  Dizziness.  Strong cravings for sugary foods in addition to  nicotine.  Mild weight gain.  Constipation.  Nausea.  Coughing or a sore throat.  Changes in how your medicines work in your body.  A depressed mood.  Difficulty sleeping (insomnia).  After the first 2-3 weeks of quitting, you may start to notice more positive results, such as:  Improved sense of smell and taste.  Decreased coughing and sore throat.  Slower heart rate.  Lower blood pressure.  Clearer skin.  The ability to breathe more easily.  Fewer sick days.  Quitting smoking is very challenging for most people. Do not get discouraged if you are not successful the first time. Some people need to make many attempts to quit before they achieve long-term success. Do your best to stick to your quit plan, and talk with your health care provider if you have any questions or concerns.  This information is not intended to replace advice given to you by your health care provider. Make sure you discuss any questions you have with your health care provider.  Document Released: 12/12/2002 Document Revised: 08/15/2017 Document Reviewed: 05/03/2016  [employer] Interactive Patient Education © 2017 [employer]

## 2023-05-19 NOTE — PROGRESS NOTES
Subjective   Myriam Thomas is a 52 y.o. female. 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   Gastrointestinal: Negative for abdominal pain.   Musculoskeletal: Positive for back pain.   Neurological: Negative for weakness and numbness.       Patient Active Problem List   Diagnosis   • Mitral valve insufficiency   • Palpitations   • Essential hypertension   • Tobacco use   • Anxiety   • Mixed hyperlipidemia   • B12 deficiency   • Smoker unmotivated to quit   • IUD (intrauterine device) in place   • Heme positive stool   • Rectal bleeding   • Colon polyp   • Family history of colon cancer       Social History     Socioeconomic History   • Marital status:      Spouse name: Roddy   • Number of children: 3   • Years of education: 12   Tobacco Use   • Smoking status: Every Day     Packs/day: 1.00     Years: 31.00     Pack years: 31.00     Types: Cigarettes     Start date: 1/1/1992   • Smokeless tobacco: Never   Vaping Use   • Vaping Use: Never used   Substance and Sexual Activity   • Alcohol use: Yes     Comment: RARELY   • Drug use: No   • Sexual activity: Not Currently     Partners: Male     Birth control/protection: I.U.D.       Allergies   Allergen Reactions   • Hydrocodone-Acetaminophen Itching       Current Outpatient Medications on File Prior to Visit   Medication Sig Dispense Refill   • amLODIPine (NORVASC) 5 MG tablet TAKE ONE TABLET BY MOUTH DAILY  "30 tablet 5   • atorvastatin (LIPITOR) 40 MG tablet TAKE ONE TABLET BY MOUTH DAILY 90 tablet 3   • bisoprolol-hydrochlorothiazide (ZIAC) 10-6.25 MG per tablet TAKE ONE TABLET BY MOUTH DAILY 90 tablet 3   • busPIRone (BUSPAR) 10 MG tablet TAKE ONE TABLET BY MOUTH TWICE A  tablet 3   • FLUoxetine (PROzac) 20 MG capsule TAKE ONE CAPSULE BY MOUTH DAILY 90 capsule 3   • olmesartan-hydrochlorothiazide (BENICAR HCT) 20-12.5 MG per tablet TAKE ONE TABLET BY MOUTH DAILY 90 tablet 3     No current facility-administered medications on file prior to visit.       Objective   Vitals:    05/19/23 0824   BP: 110/62   BP Location: Left arm   Patient Position: Sitting   Cuff Size: Adult   Pulse: 92   Resp: 18   SpO2: 96%   Weight: 78 kg (172 lb)   Height: 162.2 cm (63.86\")     Body mass index is 29.66 kg/m².    Physical Exam  Vitals and nursing note reviewed.   Constitutional:       Appearance: She is well-developed.   HENT:      Head: Normocephalic and atraumatic.   Neck:      Thyroid: No thyromegaly.      Vascular: No JVD.   Cardiovascular:      Rate and Rhythm: Normal rate and regular rhythm.      Heart sounds: Normal heart sounds. No murmur heard.    No friction rub. No gallop.   Pulmonary:      Effort: Pulmonary effort is normal. No respiratory distress.      Breath sounds: Normal breath sounds. No wheezing or rales.   Abdominal:      General: Bowel sounds are normal. There is no distension.      Palpations: Abdomen is soft.      Tenderness: There is no abdominal tenderness. There is no guarding or rebound.   Musculoskeletal:      Cervical back: Neck supple.      Lumbar back: Spasms and tenderness present.   Skin:     General: Skin is warm and dry.   Neurological:      Mental Status: She is alert.   Psychiatric:         Behavior: Behavior normal.       Myriam CHAVEZ Martha  reports that she has been smoking cigarettes. She started smoking about 31 years ago. She has a 31.00 pack-year smoking history. She has never used smokeless " tobacco.. I have educated her on the risk of diseases from using tobacco products such as cancer, COPD and heart disease.     I advised her to quit and she is willing to quit. We have discussed the following method/s for tobacco cessation:  Education Material Prescription Medicaiton.  Together we have set a quit date for 1 month from today.  She will follow up with me in 6 months or sooner to check on her progress.    I spent 3  minutes counseling the patient.         Assessment & Plan   Diagnoses and all orders for this visit:    1. Essential hypertension (Primary)  -     Comprehensive Metabolic Panel    2. Mixed hyperlipidemia  -     Comprehensive Metabolic Panel  -     Lipid Panel    3. LORRIE (generalized anxiety disorder)    4. Prediabetes  -     Hemoglobin A1c    5. Overweight (BMI 25.0-29.9)    6. Cigarette nicotine dependence without complication  -     CT chest low dose wo; Future  -     varenicline (APO-Varenicline) 1 MG tablet; Take 1 tablet by mouth 2 (Two) Times a Day.  Dispense: 60 tablet; Refill: 3  -     varenicline (APO-Varenicline) 0.5 MG tablet; Take 0.5 mg one daily on days 1-3 and and 0.5 mg twice daily on days 4-7.Then 1 mg twice daily for a total of 12 weeks.  Dispense: 56 tablet; Refill: 0    7. Encounter for screening for lung cancer  -     CT chest low dose wo; Future    8. Chronic left-sided low back pain with left-sided sciatica  -     methylPREDNISolone (MEDROL) 4 MG dose pack; Take as directed on package instructions.  Dispense: 21 tablet; Refill: 0  -     baclofen (LIORESAL) 10 MG tablet; Take 1 tablet by mouth 3 (Three) Times a Day As Needed for Muscle Spasms.  Dispense: 90 tablet; Refill: 0    -hypertension - controlled, continue medications  -HLD - continue statin, recheck lipids.  Lorrie - continue med  Smoking cessation recommend quit, willl try chantix  Candidate for lung cancer screening  Due dm2 screen    Expected: 5/24/2023, Expires: 5/19/2024, Routine, Hospital Performed, Reason  for Exam: Lung cancer screening, < 20 pk-yr smoking history (Age >= 50y)   Release to patient: Routine Release   Patient Pregnant: No   The patient is age 50-80 (Medicare coverage 50-77): 50   The patient is a current smoker? Yes   The patient has a smoking history of 20 pack-years or greater: Yes   Actual pack - year smoking history (number): 31   Does the patient have any clinical signs/symptoms of lung cancer? No   The patient was engaged in shared decision-making for this test: Yes           BMI is >= 25 and <30. (Overweight) The following options were offered after discussion;: weight loss educational material (shared in after visit summary)     -Follow up:

## 2023-05-21 LAB
ALBUMIN SERPL-MCNC: 4.9 G/DL (ref 3.5–5.2)
ALBUMIN/GLOB SERPL: 2.5 G/DL
ALP SERPL-CCNC: 83 U/L (ref 39–117)
ALT SERPL-CCNC: 47 U/L (ref 1–33)
AST SERPL-CCNC: 48 U/L (ref 1–32)
BILIRUB SERPL-MCNC: 0.3 MG/DL (ref 0–1.2)
BUN SERPL-MCNC: 10 MG/DL (ref 6–20)
BUN/CREAT SERPL: 11.9 (ref 7–25)
CALCIUM SERPL-MCNC: 9.7 MG/DL (ref 8.6–10.5)
CHLORIDE SERPL-SCNC: 103 MMOL/L (ref 98–107)
CHOLEST SERPL-MCNC: 119 MG/DL (ref 0–200)
CO2 SERPL-SCNC: 28 MMOL/L (ref 22–29)
COTININE UR QL SCN: POSITIVE NG/ML
CREAT SERPL-MCNC: 0.84 MG/DL (ref 0.57–1)
EGFRCR SERPLBLD CKD-EPI 2021: 84.8 ML/MIN/1.73
GLOBULIN SER CALC-MCNC: 2 GM/DL
GLUCOSE SERPL-MCNC: 112 MG/DL (ref 65–99)
HBA1C MFR BLD: 6 % (ref 4.8–5.6)
HDLC SERPL-MCNC: 30 MG/DL (ref 40–60)
LDLC SERPL CALC-MCNC: 60 MG/DL (ref 0–100)
Lab: ABNORMAL
POTASSIUM SERPL-SCNC: 4.7 MMOL/L (ref 3.5–5.2)
PROT SERPL-MCNC: 6.9 G/DL (ref 6–8.5)
SODIUM SERPL-SCNC: 143 MMOL/L (ref 136–145)
TRIGL SERPL-MCNC: 168 MG/DL (ref 0–150)
VLDLC SERPL CALC-MCNC: 29 MG/DL (ref 5–40)

## 2023-05-21 NOTE — PROGRESS NOTES
Call results to patient.  Kidney and liver function normal  Liver enzymes borderline.  Not significantly elevated, would recheck again in 6 months at office visit  A1c prediabetes range - work on die tand exercise  Nicotine + as expected - complete biometric forms for insurance  Cholesterol well controlled, Mildly elevated triglycerides

## 2023-06-20 PROBLEM — R06.02 SOB (SHORTNESS OF BREATH): Status: ACTIVE | Noted: 2023-06-20

## 2023-07-28 ENCOUNTER — TELEPHONE (OUTPATIENT)
Dept: GASTROENTEROLOGY | Facility: CLINIC | Age: 53
End: 2023-07-28

## 2023-07-28 NOTE — TELEPHONE ENCOUNTER
Caller: Myriam Thomas    Relationship to patient: Self    Best call back number: [phone]     Type of visit: COLONOSCOPY    Requested date: ASAP    If rescheduling, when is the original appointment: 8/4/23    Additional notes:PT IS UNABLE TO MAKE APPT ON THE 8/4/23 AND WOULD LIKE TO R/S ASAP. CALL BACK ANYTIME OK TO [facility].

## 2023-08-28 ENCOUNTER — TELEPHONE (OUTPATIENT)
Dept: GASTROENTEROLOGY | Facility: CLINIC | Age: 53
End: 2023-08-28
Payer: COMMERCIAL

## 2023-08-28 NOTE — TELEPHONE ENCOUNTER
----- Message from Myriam Thomas sent at 8/26/2023  2:12 PM EDT -----  Regarding: Colonoscopy  Contact: [phone]  I called on 07/28/2023 to reschedule my procedure.  I was advised someone would reach out to me within 48 hours.  On 08/17/2023 I called again to schedule an appointment as I still had not heard from anyone.  No one answered the line on this particular day.  I am needing to schedule an appointment to have a Colonoscopy completed.  I can be reached at [phone] or [email or web address].  Thank you for your assistance in advaance.

## 2023-08-28 NOTE — TELEPHONE ENCOUNTER
I have called pt to let pt know that Jessika is booked for this year and if he has any cancellations we will call pt if not once his 2024 opens up we will call.

## 2023-09-17 DIAGNOSIS — F17.210 CIGARETTE NICOTINE DEPENDENCE WITHOUT COMPLICATION: ICD-10-CM

## 2023-09-17 RX ORDER — VARENICLINE TARTRATE 0.5 MG/1
TABLET, FILM COATED ORAL
Qty: 56 TABLET | Refills: 0 | Status: SHIPPED | OUTPATIENT
Start: 2023-09-17

## 2023-09-17 RX ORDER — VARENICLINE TARTRATE 1 MG/1
1 TABLET, FILM COATED ORAL 2 TIMES DAILY
Qty: 60 TABLET | Refills: 3 | Status: SHIPPED | OUTPATIENT
Start: 2023-09-17

## 2023-09-23 DIAGNOSIS — I10 ESSENTIAL HYPERTENSION: ICD-10-CM

## 2023-09-25 RX ORDER — AMLODIPINE BESYLATE 5 MG/1
TABLET ORAL
Qty: 30 TABLET | Refills: 5 | Status: SHIPPED | OUTPATIENT
Start: 2023-09-25

## 2023-10-13 ENCOUNTER — TELEPHONE (OUTPATIENT)
Dept: GASTROENTEROLOGY | Facility: CLINIC | Age: 53
End: 2023-10-13
Payer: COMMERCIAL

## 2023-10-17 DIAGNOSIS — I10 ESSENTIAL HYPERTENSION: ICD-10-CM

## 2023-10-17 RX ORDER — BISOPROLOL FUMARATE AND HYDROCHLOROTHIAZIDE 10; 6.25 MG/1; MG/1
TABLET ORAL
Qty: 90 TABLET | Refills: 3 | Status: SHIPPED | OUTPATIENT
Start: 2023-10-17

## 2023-10-17 RX ORDER — OLMESARTAN MEDOXOMIL AND HYDROCHLOROTHIAZIDE 20/12.5 20; 12.5 MG/1; MG/1
TABLET ORAL
Qty: 90 TABLET | Refills: 3 | Status: SHIPPED | OUTPATIENT
Start: 2023-10-17

## 2023-11-22 NOTE — TELEPHONE ENCOUNTER
FERN [facility]bour for COLONOSCOPY on 1/4/23  arrive at 12pm  . sent prep instructions to pt my chart....miralax    
2

## 2023-12-10 DIAGNOSIS — F41.1 GAD (GENERALIZED ANXIETY DISORDER): ICD-10-CM

## 2023-12-11 RX ORDER — FLUOXETINE HYDROCHLORIDE 20 MG/1
CAPSULE ORAL
Qty: 90 CAPSULE | Refills: 3 | Status: SHIPPED | OUTPATIENT
Start: 2023-12-11

## 2023-12-11 RX ORDER — BUSPIRONE HYDROCHLORIDE 10 MG/1
TABLET ORAL
Qty: 180 TABLET | Refills: 3 | Status: SHIPPED | OUTPATIENT
Start: 2023-12-11

## 2024-01-25 ENCOUNTER — OFFICE VISIT (OUTPATIENT)
Dept: FAMILY MEDICINE CLINIC | Facility: CLINIC | Age: 54
End: 2024-01-25
Payer: COMMERCIAL

## 2024-01-25 VITALS
WEIGHT: 178 LBS | SYSTOLIC BLOOD PRESSURE: 134 MMHG | OXYGEN SATURATION: 95 % | HEART RATE: 68 BPM | HEIGHT: 63 IN | DIASTOLIC BLOOD PRESSURE: 84 MMHG | BODY MASS INDEX: 31.54 KG/M2

## 2024-01-25 DIAGNOSIS — I10 ESSENTIAL HYPERTENSION: ICD-10-CM

## 2024-01-25 DIAGNOSIS — Z12.11 ENCOUNTER FOR COLORECTAL CANCER SCREENING: ICD-10-CM

## 2024-01-25 DIAGNOSIS — Z12.12 ENCOUNTER FOR COLORECTAL CANCER SCREENING: ICD-10-CM

## 2024-01-25 DIAGNOSIS — Z12.31 ENCOUNTER FOR SCREENING MAMMOGRAM FOR BREAST CANCER: ICD-10-CM

## 2024-01-25 DIAGNOSIS — R51.9 NEW ONSET HEADACHE: ICD-10-CM

## 2024-01-25 DIAGNOSIS — F41.1 GAD (GENERALIZED ANXIETY DISORDER): ICD-10-CM

## 2024-01-25 DIAGNOSIS — I1A.0 RESISTANT HYPERTENSION: ICD-10-CM

## 2024-01-25 DIAGNOSIS — R73.03 PREDIABETES: ICD-10-CM

## 2024-01-25 DIAGNOSIS — M67.40 GANGLION CYST: Primary | ICD-10-CM

## 2024-01-25 DIAGNOSIS — E78.2 MIXED HYPERLIPIDEMIA: ICD-10-CM

## 2024-01-25 DIAGNOSIS — R53.83 OTHER FATIGUE: ICD-10-CM

## 2024-01-25 DIAGNOSIS — F17.210 CIGARETTE NICOTINE DEPENDENCE WITHOUT COMPLICATION: ICD-10-CM

## 2024-01-25 DIAGNOSIS — E66.9 OBESITY (BMI 30-39.9): ICD-10-CM

## 2024-01-25 DIAGNOSIS — Z01.419 WELL WOMAN EXAM: ICD-10-CM

## 2024-01-25 RX ORDER — OLMESARTAN MEDOXOMIL AND HYDROCHLOROTHIAZIDE 40/12.5 40; 12.5 MG/1; MG/1
1 TABLET ORAL DAILY
Qty: 90 TABLET | Refills: 1 | Status: SHIPPED | OUTPATIENT
Start: 2024-01-25

## 2024-01-25 NOTE — PROGRESS NOTES
Subjective   Myriam Thomas is a 53 y.o. female. Presents today for   Chief Complaint   Patient presents with    Hypertension    Hyperlipidemia    Anxiety    Breathing Problem         Hypertension  This is a chronic problem. The current episode started more than 1 year ago. The problem is unchanged. Condition status: had one very high couple times when severe headache. Associated symptoms include anxiety, headaches and malaise/fatigue. Pertinent negatives include no blurred vision, chest pain, orthopnea, palpitations, peripheral edema, PND or shortness of breath. The current treatment provides moderate improvement. There is no history of CAD/MI, CVA or PVD.   Hyperlipidemia  This is a chronic problem. The problem is controlled. Recent lipid tests were reviewed and are normal. Pertinent negatives include no chest pain or shortness of breath. Current antihyperlipidemic treatment includes statins. The current treatment provides moderate improvement of lipids.   Anxiety  Presents for follow-up visit. Symptoms include nervous/anxious behavior. Patient reports no chest pain, palpitations or shortness of breath. Primary symptoms comment: Just moved, though doesn't feel stressed from, but work is very stressful. Symptoms occur most days. The quality of sleep is fair.     Compliance with medications is %.   Breathing Problem  She complains of difficulty breathing. There is no shortness of breath. Associated symptoms include headaches and malaise/fatigue. Pertinent negatives include no chest pain or PND.   Hx o fmigraines, but headaches don't feel like migraines;  no tinnitus;   Has cyst right hand on thumb.  Has aftigue, has not been evaluated for woodrow in past.   Has strong fam hx of htn, heart disease.      Review of Systems   Constitutional:  Positive for malaise/fatigue.   Eyes:  Negative for blurred vision.   Respiratory:  Negative for shortness of breath.    Cardiovascular:  Negative for chest pain, palpitations,  orthopnea and PND.   Neurological:  Positive for headaches.   Psychiatric/Behavioral:  The patient is nervous/anxious.        Patient Active Problem List   Diagnosis    Mitral valve insufficiency    Palpitations    Essential hypertension    Tobacco use    Anxiety    Mixed hyperlipidemia    B12 deficiency    Smoker unmotivated to quit    IUD (intrauterine device) in place    Heme positive stool    Rectal bleeding    Colon polyp    Family history of colon cancer    SOB (shortness of breath)    Ganglion cyst -right thumb       Social History     Socioeconomic History    Marital status:      Spouse name: Roddy    Number of children: 3    Years of education: 12   Tobacco Use    Smoking status: Every Day     Packs/day: 1.00     Years: 31.00     Additional pack years: 0.00     Total pack years: 31.00     Types: Cigarettes     Start date: 10/1/1992    Smokeless tobacco: Never    Tobacco comments:     I am currently trying to quit   Vaping Use    Vaping Use: Never used   Substance and Sexual Activity    Alcohol use: Yes     Comment: I only consume alcohol occasionally    Drug use: No    Sexual activity: Not Currently     Partners: Male     Birth control/protection: I.U.D.       Allergies   Allergen Reactions    Hydrocodone-Acetaminophen Itching       Current Outpatient Medications on File Prior to Visit   Medication Sig Dispense Refill    amLODIPine (NORVASC) 5 MG tablet TAKE ONE TABLET BY MOUTH DAILY 30 tablet 5    atorvastatin (LIPITOR) 40 MG tablet TAKE ONE TABLET BY MOUTH DAILY 90 tablet 3    baclofen (LIORESAL) 10 MG tablet Take 1 tablet by mouth 3 (Three) Times a Day As Needed for Muscle Spasms. 90 tablet 0    bisoprolol-hydrochlorothiazide (ZIAC) 10-6.25 MG per tablet TAKE ONE TABLET BY MOUTH DAILY 90 tablet 3    busPIRone (BUSPAR) 10 MG tablet TAKE ONE TABLET BY MOUTH TWICE A  tablet 3    FLUoxetine (PROzac) 20 MG capsule TAKE ONE CAPSULE BY MOUTH DAILY 90 capsule 3    varenicline (APO-Varenicline) 1  "MG tablet Take 1 tablet by mouth 2 (Two) Times a Day. 60 tablet 3    [DISCONTINUED] olmesartan-hydrochlorothiazide (BENICAR HCT) 20-12.5 MG per tablet TAKE ONE TABLET BY MOUTH DAILY 90 tablet 3    [DISCONTINUED] methylPREDNISolone (MEDROL) 4 MG dose pack Take as directed on package instructions. (Patient not taking: Reported on 1/25/2024) 21 tablet 0    [DISCONTINUED] varenicline (APO-Varenicline) 0.5 MG tablet Take 0.5 mg one daily on days 1-3 and and 0.5 mg twice daily on days 4-7.Then 1 mg twice daily for a total of 12 weeks. 56 tablet 0     No current facility-administered medications on file prior to visit.       Objective   Vitals:    01/25/24 1503   BP: 134/84   Pulse: 68   SpO2: 95%   Weight: 80.7 kg (178 lb)   Height: 160 cm (63\")     Body mass index is 31.53 kg/m².    Physical Exam  Vitals and nursing note reviewed.   Constitutional:       Appearance: She is well-developed.   HENT:      Head: Normocephalic and atraumatic.   Neck:      Thyroid: No thyromegaly.      Vascular: No JVD.   Cardiovascular:      Rate and Rhythm: Normal rate and regular rhythm.      Heart sounds: Normal heart sounds. No murmur heard.     No friction rub. No gallop.   Pulmonary:      Effort: Pulmonary effort is normal. No respiratory distress.      Breath sounds: Normal breath sounds. No wheezing or rales.   Abdominal:      General: Bowel sounds are normal. There is no distension.      Palpations: Abdomen is soft.      Tenderness: There is no abdominal tenderness. There is no guarding or rebound.   Musculoskeletal:      Cervical back: Neck supple.   Skin:     General: Skin is warm and dry.   Neurological:      Mental Status: She is alert.   Psychiatric:         Behavior: Behavior normal.         Assessment & Plan   Diagnoses and all orders for this visit:    1. Ganglion cyst -right thumb (Primary)    2. Essential hypertension  -     Comprehensive Metabolic Panel  -     olmesartan-hydrochlorothiazide (BENICAR HCT) 40-12.5 MG per " tablet; Take 1 tablet by mouth Daily.  Dispense: 90 tablet; Refill: 1    3. Mixed hyperlipidemia  -     Comprehensive Metabolic Panel  -     Lipid Panel    4. Prediabetes  -     Hemoglobin A1c    5. LORRIE (generalized anxiety disorder)    6. Obesity (BMI 30-39.9)    7. New onset headache  -     Comprehensive Metabolic Panel  -     CBC (No Diff)  -     Sedimentation Rate  -     MRI Brain With & Without Contrast; Future    8. Resistant hypertension  -     Aldosterone / Renin Ratio  -     Ambulatory Referral to Sleep Medicine    9. Other fatigue  -     Ambulatory Referral to Sleep Medicine    10. Encounter for screening mammogram for breast cancer  -     Mammo Screening Digital Tomosynthesis Bilateral With CAD; Future    11. Encounter for colorectal cancer screening  -     Ambulatory Referral For Screening Colonoscopy    12. Well woman exam  -     Ambulatory Referral to Gynecology    Bp not goal and rising;  will raise olmesartan to 40mg daily.   Continue other meds  -HLD - continue statin, recheck lipids.  -predm - due screen  -new onset headaches, will check MRI  Is cutting back on smoking, encouraged to quit  Lorrie stable on meds  Likely ganglion cyst on exam, monitor.    Needs new gyn as Dr. Banks retired;  Due mammo and c-scope       BMI is >= 30 and <35. (Class 1 Obesity). The following options were offered after discussion;: weight loss educational material (shared in after visit summary)          -Follow up:  6 to 8 weeks and prn

## 2024-01-25 NOTE — PATIENT INSTRUCTIONS
Calorie Counting for Weight Loss  Calories are units of energy. Your body needs a certain number of calories from food to keep going throughout the day. When you eat or drink more calories than your body needs, your body stores the extra calories mostly as fat. When you eat or drink fewer calories than your body needs, your body burns fat to get the energy it needs.  Calorie counting means keeping track of how many calories you eat and drink each day. Calorie counting can be helpful if you need to lose weight. If you eat fewer calories than your body needs, you should lose weight. Ask your health care provider what a healthy weight is for you.  For calorie counting to work, you will need to eat the right number of calories each day to lose a healthy amount of weight per week. A dietitian can help you figure out how many calories you need in a day and will suggest ways to reach your calorie goal.  A healthy amount of weight to lose each week is usually 1-2 lb (0.5-0.9 kg). This usually means that your daily calorie intake should be reduced by 500-750 calories.  Eating 1,200-1,500 calories a day can help most women lose weight.  Eating 1,500-1,800 calories a day can help most men lose weight.  What do I need to know about calorie counting?  Work with your health care provider or dietitian to determine how many calories you should get each day. To meet your daily calorie goal, you will need to:  Find out how many calories are in each food that you would like to eat. Try to do this before you eat.  Decide how much of the food you plan to eat.  Keep a food log. Do this by writing down what you ate and how many calories it had.  To successfully lose weight, it is important to balance calorie counting with a healthy lifestyle that includes regular activity.  Where do I find calorie information?  The number of calories in a food can be found on a Nutrition Facts label. If a food does not have a Nutrition Facts label, try to  look up the calories online or ask your dietitian for help.  Remember that calories are listed per serving. If you choose to have more than one serving of a food, you will have to multiply the calories per serving by the number of servings you plan to eat. For example, the label on a package of bread might say that a serving size is 1 slice and that there are 90 calories in a serving. If you eat 1 slice, you will have eaten 90 calories. If you eat 2 slices, you will have eaten 180 calories.  How do I keep a food log?  After each time that you eat, record the following in your food log as soon as possible:  What you ate. Be sure to include toppings, sauces, and other extras on the food.  How much you ate. This can be measured in cups, ounces, or number of items.  How many calories were in each food and drink.  The total number of calories in the food you ate.  Keep your food log near you, such as in a pocket-sized notebook or on an hansel or website on your mobile phone. Some programs will calculate calories for you and show you how many calories you have left to meet your daily goal.  What are some portion-control tips?  Know how many calories are in a serving. This will help you know how many servings you can have of a certain food.  Use a measuring cup to measure serving sizes. You could also try weighing out portions on a kitchen scale. With time, you will be able to estimate serving sizes for some foods.  Take time to put servings of different foods on your favorite plates or in your favorite bowls and cups so you know what a serving looks like.  Try not to eat straight from a food's packaging, such as from a bag or box. Eating straight from the package makes it hard to see how much you are eating and can lead to overeating. Put the amount you would like to eat in a cup or on a plate to make sure you are eating the right portion.  Use smaller plates, glasses, and bowls for smaller portions and to prevent  overeating.  Try not to multitask. For example, avoid watching TV or using your computer while eating. If it is time to eat, sit down at a table and enjoy your food. This will help you recognize when you are full. It will also help you be more mindful of what and how much you are eating.  What are tips for following this plan?  Reading food labels  Check the calorie count compared with the serving size. The serving size may be smaller than what you are used to eating.  Check the source of the calories. Try to choose foods that are high in protein, fiber, and vitamins, and low in saturated fat, trans fat, and sodium.  Shopping  Read nutrition labels while you shop. This will help you make healthy decisions about which foods to buy.  Pay attention to nutrition labels for low-fat or fat-free foods. These foods sometimes have the same number of calories or more calories than the full-fat versions. They also often have added sugar, starch, or salt to make up for flavor that was removed with the fat.  Make a grocery list of lower-calorie foods and stick to it.  Cooking  Try to cook your favorite foods in a healthier way. For example, try baking instead of frying.  Use low-fat dairy products.  Meal planning  Use more fruits and vegetables. One-half of your plate should be fruits and vegetables.  Include lean proteins, such as chicken, turkey, and fish.  Lifestyle  Each week, aim to do one of the followin minutes of moderate exercise, such as walking.  75 minutes of vigorous exercise, such as running.  General information  Know how many calories are in the foods you eat most often. This will help you calculate calorie counts faster.  Find a way of tracking calories that works for you. Get creative. Try different apps or programs if writing down calories does not work for you.  What foods should I eat?    Eat nutritious foods. It is better to have a nutritious, high-calorie food, such as an avocado, than a food with  few nutrients, such as a bag of potato chips.  Use your calories on foods and drinks that will fill you up and will not leave you hungry soon after eating.  Examples of foods that fill you up are nuts and nut butters, vegetables, lean proteins, and high-fiber foods such as whole grains. High-fiber foods are foods with more than 5 g of fiber per serving.  Pay attention to calories in drinks. Low-calorie drinks include water and unsweetened drinks.  The items listed above may not be a complete list of foods and beverages you can eat. Contact a dietitian for more information.  What foods should I limit?  Limit foods or drinks that are not good sources of vitamins, minerals, or protein or that are high in unhealthy fats. These include:  Candy.  Other sweets.  Sodas, specialty coffee drinks, alcohol, and juice.  The items listed above may not be a complete list of foods and beverages you should avoid. Contact a dietitian for more information.  How do I count calories when eating out?  Pay attention to portions. Often, portions are much larger when eating out. Try these tips to keep portions smaller:  Consider sharing a meal instead of getting your own.  If you get your own meal, eat only half of it. Before you start eating, ask for a container and put half of your meal into it.  When available, consider ordering smaller portions from the menu instead of full portions.  Pay attention to your food and drink choices. Knowing the way food is cooked and what is included with the meal can help you eat fewer calories.  If calories are listed on the menu, choose the lower-calorie options.  Choose dishes that include vegetables, fruits, whole grains, low-fat dairy products, and lean proteins.  Choose items that are boiled, broiled, grilled, or steamed. Avoid items that are buttered, battered, fried, or served with cream sauce. Items labeled as crispy are usually fried, unless stated otherwise.  Choose water, low-fat milk,  unsweetened iced tea, or other drinks without added sugar. If you want an alcoholic beverage, choose a lower-calorie option, such as a glass of wine or light beer.  Ask for dressings, sauces, and syrups on the side. These are usually high in calories, so you should limit the amount you eat.  If you want a salad, choose a garden salad and ask for grilled meats. Avoid extra toppings such as whitaker, cheese, or fried items. Ask for the dressing on the side, or ask for olive oil and vinegar or lemon to use as dressing.  Estimate how many servings of a food you are given. Knowing serving sizes will help you be aware of how much food you are eating at restaurants.  Where to find more information  Centers for Disease Control and Prevention: www.cdc.gov  [place] Department of Agriculture: myplate.gov  Summary  Calorie counting means keeping track of how many calories you eat and drink each day. If you eat fewer calories than your body needs, you should lose weight.  A healthy amount of weight to lose per week is usually 1-2 lb (0.5-0.9 kg). This usually means reducing your daily calorie intake by 500-750 calories.  The number of calories in a food can be found on a Nutrition Facts label. If a food does not have a Nutrition Facts label, try to look up the calories online or ask your dietitian for help.  Use smaller plates, glasses, and bowls for smaller portions and to prevent overeating.  Use your calories on foods and drinks that will fill you up and not leave you hungry shortly after a meal.  This information is not intended to replace advice given to you by your health care provider. Make sure you discuss any questions you have with your health care provider.  Document Revised: 01/28/2021 Document Reviewed: 01/28/2021  Elsevier Patient Education © 2022 [employer] Inc.    Exercising to Lose Weight  Getting regular exercise is important for everyone. It is especially important if you are overweight. Being overweight increases  your risk of heart disease, stroke, diabetes, high blood pressure, and several types of cancer. Exercising, and reducing the calories you consume, can help you lose weight and improve fitness and health.  Exercise can be moderate or vigorous intensity. To lose weight, most people need to do a certain amount of moderate or vigorous-intensity exercise each week.  How can exercise affect me?  You lose weight when you exercise enough to burn more calories than you eat. Exercise also reduces body fat and builds muscle. The more muscle you have, the more calories you burn. Exercise also:  Improves mood.  Reduces stress and tension.  Improves your overall fitness, flexibility, and endurance.  Increases bone strength.  Moderate-intensity exercise  Moderate-intensity exercise is any activity that gets you moving enough to burn at least three times more energy (calories) than if you were sitting.  Examples of moderate exercise include:  Walking a mile in 15 minutes.  Doing light yard work.  Biking at an easy pace.  Most people should get at least 150 minutes of moderate-intensity exercise a week to maintain their body weight.  Vigorous-intensity exercise  Vigorous-intensity exercise is any activity that gets you moving enough to burn at least six times more calories than if you were sitting. When you exercise at this intensity, you should be working hard enough that you are not able to carry on a conversation.  Examples of vigorous exercise include:  Running.  Playing a team sport, such as football, basketball, and soccer.  Jumping rope.  Most people should get at least 75 minutes a week of vigorous exercise to maintain their body weight.  What actions can I take to lose weight?  The amount of exercise you need to lose weight depends on:  Your age.  The type of exercise.  Any health conditions you have.  Your overall physical ability.  Talk to your health care provider about how much exercise you need and what types of  activities are safe for you.  Nutrition    Make changes to your diet as told by your health care provider or diet and nutrition specialist (dietitian). This may include:  Eating fewer calories.  Eating more protein.  Eating less unhealthy fats.  Eating a diet that includes fresh fruits and vegetables, whole grains, low-fat dairy products, and lean protein.  Avoiding foods with added fat, salt, and sugar.  Drink plenty of water while you exercise to prevent dehydration or heat stroke.  Activity  Choose an activity that you enjoy and set realistic goals. Your health care provider can help you make an exercise plan that works for you.  Exercise at a moderate or vigorous intensity most days of the week.  The intensity of exercise may vary from person to person. You can tell how intense a workout is for you by paying attention to your breathing and heartbeat. Most people will notice their breathing and heartbeat get faster with more intense exercise.  Do resistance training twice each week, such as:  Push-ups.  Sit-ups.  Lifting weights.  Using resistance bands.  Getting short amounts of exercise can be just as helpful as long, structured periods of exercise. If you have trouble finding time to exercise, try doing these things as part of your daily routine:  Get up, stretch, and walk around every 30 minutes throughout the day.  Go for a walk during your lunch break.  Park your car farther away from your destination.  If you take public transportation, get off one stop early and walk the rest of the way.  Make phone calls while standing up and walking around.  Take the stairs instead of elevators or escalators.  Wear comfortable clothes and shoes with good support.  Do not exercise so much that you hurt yourself, feel dizzy, or get very short of breath.  Where to find more information  [place] Department of Health and Human Services: www.hhs.gov  Centers for Disease Control and Prevention: www.cdc.gov  Contact a health care  provider:  Before starting a new exercise program.  If you have questions or concerns about your weight.  If you have a medical problem that keeps you from exercising.  Get help right away if:  You have any of the following while exercising:  Injury.  Dizziness.  Difficulty breathing or shortness of breath that does not go away when you stop exercising.  Chest pain.  Rapid heartbeat.  These symptoms may represent a serious problem that is an emergency. Do not wait to see if the symptoms will go away. Get medical help right away. Call your local emergency services (911 in the [place] Do not drive yourself to the hospital.  Summary  Getting regular exercise is especially important if you are overweight.  Being overweight increases your risk of heart disease, stroke, diabetes, high blood pressure, and several types of cancer.  Losing weight happens when you burn more calories than you eat.  Reducing the amount of calories you eat, and getting regular moderate or vigorous exercise each week, helps you lose weight.  This information is not intended to replace advice given to you by your health care provider. Make sure you discuss any questions you have with your health care provider.  Document Revised: 02/13/2022 Document Reviewed: 02/13/2022  [employer] Patient Education © 2022 [employer] For more information:    Quit Now [place]  1-800-QUIT-NOW  https://[facility].quitlogix.org/en-[place]/  Steps to Quit Smoking  Smoking tobacco can be harmful to your health and can affect almost every organ in your body. Smoking puts you, and those around you, at risk for developing many serious chronic diseases. Quitting smoking is difficult, but it is one of the best things that you can do for your health. It is never too late to quit.  What are the benefits of quitting smoking?  When you quit smoking, you lower your risk of developing serious diseases and conditions, such as:  Lung cancer or lung disease, such as COPD.  Heart  disease.  Stroke.  Heart attack.  Infertility.  Osteoporosis and bone fractures.  Additionally, symptoms such as coughing, wheezing, and shortness of breath may get better when you quit. You may also find that you get sick less often because your body is stronger at fighting off colds and infections. If you are pregnant, quitting smoking can help to reduce your chances of having a baby of low birth weight.  How do I get ready to quit?  When you decide to quit smoking, create a plan to make sure that you are successful. Before you quit:  Pick a date to quit. Set a date within the next two weeks to give you time to prepare.  Write down the reasons why you are quitting. Keep this list in places where you will see it often, such as on your bathroom mirror or in your car or wallet.  Identify the people, places, things, and activities that make you want to smoke (triggers) and avoid them. Make sure to take these actions:  Throw away all cigarettes at home, at work, and in your car.  Throw away smoking accessories, such as ashtrays and lighters.  Clean your car and make sure to empty the ashtray.  Clean your home, including curtains and carpets.  Tell your family, friends, and coworkers that you are quitting. Support from your loved ones can make quitting easier.  Talk with your health care provider about your options for quitting smoking.  Find out what treatment options are covered by your health insurance.  What strategies can I use to quit smoking?  Talk with your healthcare provider about different strategies to quit smoking. Some strategies include:  Quitting smoking altogether instead of gradually lessening how much you smoke over a period of time. Research shows that quitting “cold turkey” is more successful than gradually quitting.  Attending in-person counseling to help you build problem-solving skills. You are more likely to have success in quitting if you attend several counseling sessions. Even short sessions  of 10 minutes can be effective.  Finding resources and support systems that can help you to quit smoking and remain smoke-free after you quit. These resources are most helpful when you use them often. They can include:  Online chats with a counselor.  Telephone quitlines.  Printed self-help materials.  Support groups or group counseling.  Text messaging programs.  Mobile phone applications.  Taking medicines to help you quit smoking. (If you are pregnant or breastfeeding, talk with your health care provider first.) Some medicines contain nicotine and some do not. Both types of medicines help with cravings, but the medicines that include nicotine help to relieve withdrawal symptoms. Your health care provider may recommend:  Nicotine patches, gum, or lozenges.  Nicotine inhalers or sprays.  Non-nicotine medicine that is taken by mouth.  Talk with your health care provider about combining strategies, such as taking medicines while you are also receiving in-person counseling. Using these two strategies together makes you more likely to succeed in quitting than if you used either strategy on its own.  If you are pregnant or breastfeeding, talk with your health care provider about finding counseling or other support strategies to quit smoking. Do not take medicine to help you quit smoking unless told to do so by your health care provider.  What things can I do to make it easier to quit?  Quitting smoking might feel overwhelming at first, but there is a lot that you can do to make it easier. Take these important actions:  Reach out to your family and friends and ask that they support and encourage you during this time. Call telephone quitlines, reach out to support groups, or work with a counselor for support.  Ask people who smoke to avoid smoking around you.  Avoid places that trigger you to smoke, such as bars, parties, or smoke-break areas at work.  Spend time around people who do not smoke.  Lessen stress in your  life, because stress can be a smoking trigger for some people. To lessen stress, try:  Exercising regularly.  Deep-breathing exercises.  Yoga.  Meditating.  Performing a body scan. This involves closing your eyes, scanning your body from head to toe, and noticing which parts of your body are particularly tense. Purposefully relax the muscles in those areas.  Download or purchase mobile phone or tablet apps (applications) that can help you stick to your quit plan by providing reminders, tips, and encouragement. There are many free apps, such as QuitGuide from the CDC (Centers for Disease Control and Prevention). You can find other support for quitting smoking (smoking cessation) through smokefree.gov and other websites.  How will I feel when I quit smoking?  Within the first 24 hours of quitting smoking, you may start to feel some withdrawal symptoms. These symptoms are usually most noticeable 2-3 days after quitting, but they usually do not last beyond 2-3 weeks. Changes or symptoms that you might experience include:  Mood swings.  Restlessness, anxiety, or irritation.  Difficulty concentrating.  Dizziness.  Strong cravings for sugary foods in addition to nicotine.  Mild weight gain.  Constipation.  Nausea.  Coughing or a sore throat.  Changes in how your medicines work in your body.  A depressed mood.  Difficulty sleeping (insomnia).  After the first 2-3 weeks of quitting, you may start to notice more positive results, such as:  Improved sense of smell and taste.  Decreased coughing and sore throat.  Slower heart rate.  Lower blood pressure.  Clearer skin.  The ability to breathe more easily.  Fewer sick days.  Quitting smoking is very challenging for most people. Do not get discouraged if you are not successful the first time. Some people need to make many attempts to quit before they achieve long-term success. Do your best to stick to your quit plan, and talk with your health care provider if you have any  questions or concerns.  This information is not intended to replace advice given to you by your health care provider. Make sure you discuss any questions you have with your health care provider.  Document Released: 12/12/2002 Document Revised: 08/15/2017 Document Reviewed: 05/03/2016  Elsevier Interactive Patient Education © 2017 [employer].

## 2024-01-26 ENCOUNTER — PATIENT ROUNDING (BHMG ONLY) (OUTPATIENT)
Dept: FAMILY MEDICINE CLINIC | Facility: CLINIC | Age: 54
End: 2024-01-26
Payer: COMMERCIAL

## 2024-01-26 NOTE — PROGRESS NOTES
A [employer] message has been sent to the patient for PATIENT ROUNDING with [facility] [place].

## 2024-02-04 ENCOUNTER — TELEPHONE (OUTPATIENT)
Dept: FAMILY MEDICINE CLINIC | Facility: CLINIC | Age: 54
End: 2024-02-04
Payer: COMMERCIAL

## 2024-02-05 ENCOUNTER — PROCEDURE VISIT (OUTPATIENT)
Dept: OBSTETRICS AND GYNECOLOGY | Facility: CLINIC | Age: 54
End: 2024-02-05
Payer: COMMERCIAL

## 2024-02-05 ENCOUNTER — OFFICE VISIT (OUTPATIENT)
Dept: OBSTETRICS AND GYNECOLOGY | Facility: CLINIC | Age: 54
End: 2024-02-05
Payer: COMMERCIAL

## 2024-02-05 VITALS
DIASTOLIC BLOOD PRESSURE: 94 MMHG | SYSTOLIC BLOOD PRESSURE: 153 MMHG | WEIGHT: 179 LBS | BODY MASS INDEX: 31.71 KG/M2 | HEIGHT: 63 IN

## 2024-02-05 DIAGNOSIS — Z01.419 VISIT FOR GYNECOLOGIC EXAMINATION: Primary | ICD-10-CM

## 2024-02-05 DIAGNOSIS — Z12.31 VISIT FOR SCREENING MAMMOGRAM: Primary | ICD-10-CM

## 2024-02-05 DIAGNOSIS — N95.1 PERIMENOPAUSAL: ICD-10-CM

## 2024-02-05 PROCEDURE — 77063 BREAST TOMOSYNTHESIS BI: CPT | Performed by: OBSTETRICS & GYNECOLOGY

## 2024-02-05 PROCEDURE — 77067 SCR MAMMO BI INCL CAD: CPT | Performed by: OBSTETRICS & GYNECOLOGY

## 2024-02-05 PROCEDURE — 99396 PREV VISIT EST AGE 40-64: CPT | Performed by: OBSTETRICS & GYNECOLOGY

## 2024-02-05 NOTE — PROGRESS NOTES
[facility]  [address], [address]  [place], [place] [address]  Phone: [phone] / Fax:  [phone]      2024    [address] [id number]    Lizandro Barahona DO    Chief Complaint   Patient presents with    Gynecologic Exam     Annual Exam,last pap 2-2-23 NL HPV (-), - ASCUS HPV (-). Mammogram today. Colonoscopy 21 polyps, repeat needed.PCP has placed a referral, patient is waiting to here back. Mirena IUD in place since 18.       Myriam Thomas is here for annual gynecologic exam.  HPI - Patient with normal pap last year and ASCUS HPV negative in .  Patient had mammogram today and last screening was 1.5 years ago and was normal.  She has an IUD in place.  She is scheduling colonoscopy and requests DEXA.    Past Medical History:   Diagnosis Date    Abnormal Pap smear of cervix 2021    ASCUS HPV (-)    Anxiety     Depression     HTN (hypertension)     Hyperlipidemia     Mitral regurgitation     Palpitations     Renal calculi        Past Surgical History:   Procedure Laterality Date    BREAST BIOPSY       SECTION      COLONOSCOPY N/A 2021    Procedure: COLONOSCOPY to cecum and TI:  biopsies, cold biopsy polyps, hot snare polyp;  Surgeon: Theodore Rosen MD;  Location: [facility] ENDOSCOPY;  Service: Gastroenterology;  Laterality: N/A;  pre:   Rectal bleeding with mucous,  Heme positive stool.   post:  polyps,       ENDOSCOPY N/A 2021    Procedure: ESOPHAGOGASTRODUODENOSCOPY with biopsies;  Surgeon: Theodore Rosen MD;  Location: [facility] ENDOSCOPY;  Service: Gastroenterology;  Laterality: N/A;  pre:   heartburn  post:  gastritis, HH,      KIDNEY STONE SURGERY      WISDOM TOOTH EXTRACTION         Allergies   Allergen Reactions    Hydrocodone-Acetaminophen Itching       Social History     Socioeconomic History    Marital status:      Spouse name: Roddy    Number of children: 3    Years of education: 12   Tobacco Use    Smoking  "status: Every Day     Packs/day: 1.00     Years: 31.00     Additional pack years: 0.00     Total pack years: 31.00     Types: Cigarettes     Start date: 10/1/1992    Smokeless tobacco: Never    Tobacco comments:     I am currently trying to quit   Vaping Use    Vaping Use: Never used   Substance and Sexual Activity    Alcohol use: Yes     Comment: I only consume alcohol occasionally    Drug use: No    Sexual activity: Not Currently     Partners: Male     Birth control/protection: I.U.D.       Family History   Problem Relation Age of Onset    Heart disease Father         unsure full hx.    Heart attack Father     Hypertension Mother     Heart attack Mother     Diabetes Mother     Heart disease Mother     Hyperlipidemia Mother     Cancer Brother         between spinal cord and brain stem    No Known Problems Paternal Grandfather     Diabetes Paternal Grandmother     Breast cancer Maternal Grandmother         50s recurrence in her late 60s    Colon cancer Maternal Grandmother         70s y/o    Hypertension Maternal Grandmother     Alzheimer's disease Maternal Grandmother     Hyperlipidemia Maternal Grandmother     Prostate cancer Maternal Grandfather     Hypertension Maternal Grandfather     Emphysema Maternal Grandfather     Hyperlipidemia Maternal Grandfather     Ovarian cancer Neg Hx     Uterine cancer Neg Hx        No LMP recorded. Patient has had an implant.    OB History          3    Para   3    Term   2       1    AB        Living   3         SAB        IAB        Ectopic        Molar        Multiple        Live Births   3                Vitals:    24 1500   BP: 153/94   Weight: 81.2 kg (179 lb)   Height: 160 cm (62.99\")       Physical Exam  Constitutional:       Appearance: Normal appearance. She is well-developed.   Genitourinary:      Bladder and urethral meatus normal.      Right Labia: No tenderness or lesions.     Left Labia: No tenderness or lesions.     No vaginal discharge or " tenderness.        Right Adnexa: not tender and not full.     Left Adnexa: not tender and not full.     No cervical motion tenderness or lesion.      IUD strings visualized.      Uterus is not enlarged or tender.      No urethral tenderness or hypermobility present.   Breasts:     Right: No mass or nipple discharge.      Left: No mass or nipple discharge.   HENT:      Right Ear: External ear normal.      Left Ear: External ear normal.      Nose: Nose normal.   Eyes:      Conjunctiva/sclera: Conjunctivae normal.   Neck:      Thyroid: No thyromegaly.   Cardiovascular:      Rate and Rhythm: Normal rate and regular rhythm.      Heart sounds: Normal heart sounds.   Pulmonary:      Effort: Pulmonary effort is normal.      Breath sounds: No stridor. No wheezing.   Abdominal:      Palpations: Abdomen is soft.      Tenderness: There is no abdominal tenderness. There is no guarding or rebound.   Musculoskeletal:         General: Normal range of motion.      Cervical back: Normal range of motion and neck supple.   Neurological:      Mental Status: She is alert.      Coordination: Coordination normal.   Skin:     General: Skin is warm and dry.   Psychiatric:         Mood and Affect: Mood normal.         Behavior: Behavior normal.         Thought Content: Thought content normal.         Judgment: Judgment normal.   Vitals reviewed. Exam conducted with a chaperone present.         Diagnoses and all orders for this visit:    1. Visit for gynecologic examination (Primary)  -     IgP, Aptima HPV  -     Discussed importance of regular screening and breast awareness.    -     Recommended patient work on quitting smoking.    2. Perimenopausal  -     dexa bone density axial; Future  -     Rescheduled DEXA.        Clifton Redman MD

## 2024-02-05 NOTE — TELEPHONE ENCOUNTER
----- Message from Stephany Tiwari MA sent at 1/30/2024  7:40 AM EST -----  Regarding: FW: Appointment Conflict  Contact: [phone]    ----- Message -----  From: Myriam Thomas  Sent: 1/29/2024  10:01 PM EST  To: Elaine Inman [facility]  Subject: Appointment Conflict                             I have a follow up appointment scheduled for 03/07/2024 @ 2:30 PM.  Per the conversation that Dr. Barahona and I had he wanted to see me back in 6-8 weeks.  I need to reschedule my appointment and when I contacted the office I was told the earliest I could get in was going to be 04/25/2024.  I am wanting to know if there is any way I could possibly be seen sooner than 04/25 or if this date would be ok?  I have availability any day other than 03/07.

## 2024-02-07 LAB
CYTOLOGIST CVX/VAG CYTO: NORMAL
CYTOLOGY CVX/VAG DOC CYTO: NORMAL
CYTOLOGY CVX/VAG DOC THIN PREP: NORMAL
DX ICD CODE: NORMAL
HIV 1 & 2 AB SER-IMP: NORMAL
HPV I/H RISK 4 DNA CVX QL PROBE+SIG AMP: NEGATIVE
OTHER STN SPEC: NORMAL
STAT OF ADQ CVX/VAG CYTO-IMP: NORMAL

## 2024-02-09 ENCOUNTER — PREP FOR SURGERY (OUTPATIENT)
Dept: OTHER | Facility: HOSPITAL | Age: 54
End: 2024-02-09
Payer: COMMERCIAL

## 2024-02-09 ENCOUNTER — TELEPHONE (OUTPATIENT)
Dept: GASTROENTEROLOGY | Facility: CLINIC | Age: 54
End: 2024-02-09
Payer: COMMERCIAL

## 2024-02-09 DIAGNOSIS — K63.5 COLON POLYP: ICD-10-CM

## 2024-02-09 DIAGNOSIS — Z80.0 FAMILY HISTORY OF COLON CANCER: Primary | ICD-10-CM

## 2024-02-09 NOTE — TELEPHONE ENCOUNTER
C/S 8/26/21 IN EPIC     PERSONAL HX OF POLYPS     FAMILY HX OF POLYPS    FAMILY HX OF COLON CA    NO ASA OR BLOOD THINNERS        LIST OF  MEDICATIONS    AMLODIPINE  ATORVASTATIN   BISOPROLOL   BUSPIRONE  FLUOXETINE  VARENICLINE  OLMESARTAN          OA QUESTIONNAIRE SCANNED IN MEDIA

## 2024-02-13 ENCOUNTER — TELEPHONE (OUTPATIENT)
Dept: GASTROENTEROLOGY | Facility: CLINIC | Age: 54
End: 2024-02-13
Payer: COMMERCIAL

## 2024-02-14 ENCOUNTER — HOSPITAL ENCOUNTER (OUTPATIENT)
Dept: MRI IMAGING | Facility: HOSPITAL | Age: 54
Discharge: HOME | End: 2024-02-14
Admitting: FAMILY MEDICINE
Payer: COMMERCIAL

## 2024-02-14 DIAGNOSIS — R51.9 NEW ONSET HEADACHE: ICD-10-CM

## 2024-02-14 PROCEDURE — 70553 MRI BRAIN STEM W/O & W/DYE: CPT

## 2024-02-14 PROCEDURE — A9577 INJ MULTIHANCE: HCPCS | Performed by: FAMILY MEDICINE

## 2024-02-14 PROCEDURE — 0 GADOBENATE DIMEGLUMINE 529 MG/ML SOLUTION: Performed by: FAMILY MEDICINE

## 2024-02-14 PROCEDURE — 82565 ASSAY OF CREATININE: CPT

## 2024-02-14 RX ADMIN — GADOBENATE DIMEGLUMINE 16 ML: 529 INJECTION, SOLUTION INTRAVENOUS at 10:12

## 2024-02-15 ENCOUNTER — TELEPHONE (OUTPATIENT)
Dept: FAMILY MEDICINE CLINIC | Facility: CLINIC | Age: 54
End: 2024-02-15
Payer: COMMERCIAL

## 2024-02-15 LAB — CREAT BLDA-MCNC: 0.8 MG/DL (ref 0.6–1.3)

## 2024-02-16 ENCOUNTER — TELEPHONE (OUTPATIENT)
Dept: OBSTETRICS AND GYNECOLOGY | Facility: CLINIC | Age: 54
End: 2024-02-16
Payer: COMMERCIAL

## 2024-02-23 LAB
ALBUMIN SERPL-MCNC: 4.6 G/DL (ref 3.8–4.9)
ALBUMIN/GLOB SERPL: 2.4 {RATIO} (ref 1.2–2.2)
ALDOST SERPL-MCNC: 11.7 NG/DL (ref 0–30)
ALDOST/RENIN PLAS-RTO: >70.1 {RATIO} (ref 0–30)
ALP SERPL-CCNC: 91 IU/L (ref 44–121)
ALT SERPL-CCNC: 45 IU/L (ref 0–32)
AST SERPL-CCNC: 36 IU/L (ref 0–40)
BILIRUB SERPL-MCNC: 0.4 MG/DL (ref 0–1.2)
BUN SERPL-MCNC: 8 MG/DL (ref 6–24)
BUN/CREAT SERPL: 10 (ref 9–23)
CALCIUM SERPL-MCNC: 9.7 MG/DL (ref 8.7–10.2)
CHLORIDE SERPL-SCNC: 101 MMOL/L (ref 96–106)
CHOLEST SERPL-MCNC: 131 MG/DL (ref 100–199)
CO2 SERPL-SCNC: 26 MMOL/L (ref 20–29)
CREAT SERPL-MCNC: 0.84 MG/DL (ref 0.57–1)
EGFRCR SERPLBLD CKD-EPI 2021: 84 ML/MIN/1.73
ERYTHROCYTE [DISTWIDTH] IN BLOOD BY AUTOMATED COUNT: 12.7 % (ref 11.7–15.4)
ERYTHROCYTE [SEDIMENTATION RATE] IN BLOOD BY WESTERGREN METHOD: 2 MM/HR (ref 0–40)
GLOBULIN SER CALC-MCNC: 1.9 G/DL (ref 1.5–4.5)
GLUCOSE SERPL-MCNC: 126 MG/DL (ref 70–99)
HBA1C MFR BLD: 6.8 % (ref 4.8–5.6)
HCT VFR BLD AUTO: 41.9 % (ref 34–46.6)
HDLC SERPL-MCNC: 29 MG/DL
HGB BLD-MCNC: 14.4 G/DL (ref 11.1–15.9)
LDLC SERPL CALC-MCNC: 65 MG/DL (ref 0–99)
MCH RBC QN AUTO: 31.2 PG (ref 26.6–33)
MCHC RBC AUTO-ENTMCNC: 34.4 G/DL (ref 31.5–35.7)
MCV RBC AUTO: 91 FL (ref 79–97)
PLATELET # BLD AUTO: 345 X10E3/UL (ref 150–450)
POTASSIUM SERPL-SCNC: 4 MMOL/L (ref 3.5–5.2)
PROT SERPL-MCNC: 6.5 G/DL (ref 6–8.5)
RBC # BLD AUTO: 4.61 X10E6/UL (ref 3.77–5.28)
RENIN PLAS-CCNC: <0.167 NG/ML/HR (ref 0.17–5.38)
SODIUM SERPL-SCNC: 142 MMOL/L (ref 134–144)
TRIGL SERPL-MCNC: 227 MG/DL (ref 0–149)
VLDLC SERPL CALC-MCNC: 37 MG/DL (ref 5–40)
WBC # BLD AUTO: 8.8 X10E3/UL (ref 3.4–10.8)

## 2024-02-26 DIAGNOSIS — E11.9 TYPE 2 DIABETES MELLITUS WITHOUT COMPLICATION, WITHOUT LONG-TERM CURRENT USE OF INSULIN: Primary | ICD-10-CM

## 2024-02-26 RX ORDER — SPIRONOLACTONE 50 MG/1
50 TABLET, FILM COATED ORAL DAILY
Qty: 30 TABLET | Refills: 3 | Status: SHIPPED | OUTPATIENT
Start: 2024-02-26

## 2024-02-26 RX ORDER — METFORMIN HYDROCHLORIDE 500 MG/1
500 TABLET, EXTENDED RELEASE ORAL
Qty: 30 TABLET | Refills: 5 | Status: SHIPPED | OUTPATIENT
Start: 2024-02-26

## 2024-02-27 DIAGNOSIS — F17.210 CIGARETTE NICOTINE DEPENDENCE WITHOUT COMPLICATION: Primary | ICD-10-CM

## 2024-03-06 ENCOUNTER — OFFICE VISIT (OUTPATIENT)
Dept: FAMILY MEDICINE CLINIC | Facility: CLINIC | Age: 54
End: 2024-03-06
Payer: COMMERCIAL

## 2024-03-06 VITALS
WEIGHT: 178 LBS | SYSTOLIC BLOOD PRESSURE: 98 MMHG | OXYGEN SATURATION: 95 % | HEIGHT: 62 IN | HEART RATE: 77 BPM | BODY MASS INDEX: 32.76 KG/M2 | DIASTOLIC BLOOD PRESSURE: 70 MMHG

## 2024-03-06 DIAGNOSIS — E26.9 HYPERALDOSTERONISM: ICD-10-CM

## 2024-03-06 DIAGNOSIS — I1A.0 RESISTANT HYPERTENSION: ICD-10-CM

## 2024-03-06 DIAGNOSIS — E11.9 TYPE 2 DIABETES MELLITUS WITHOUT COMPLICATION, WITHOUT LONG-TERM CURRENT USE OF INSULIN: Primary | ICD-10-CM

## 2024-03-06 DIAGNOSIS — E11.9 ENCOUNTER FOR DIABETIC FOOT EXAM: ICD-10-CM

## 2024-03-06 RX ORDER — LANCETS 30 GAUGE
EACH MISCELLANEOUS
Qty: 100 EACH | Refills: 3 | Status: SHIPPED | OUTPATIENT
Start: 2024-03-06

## 2024-03-06 RX ORDER — GLUCOSAMINE HCL/CHONDROITIN SU 500-400 MG
CAPSULE ORAL
Qty: 100 EACH | Refills: 3 | Status: SHIPPED | OUTPATIENT
Start: 2024-03-06

## 2024-03-06 RX ORDER — BLOOD SUGAR DIAGNOSTIC
STRIP MISCELLANEOUS
Qty: 100 EACH | Refills: 3 | Status: SHIPPED | OUTPATIENT
Start: 2024-03-06

## 2024-03-06 RX ORDER — BLOOD-GLUCOSE METER
KIT MISCELLANEOUS
Qty: 1 EACH | Refills: 0 | Status: SHIPPED | OUTPATIENT
Start: 2024-03-06

## 2024-03-06 NOTE — PROGRESS NOTES
Subjective   Myriam Thomas is a 53 y.o. female. Presents today for   Chief Complaint   Patient presents with    Diabetes    Hypertension    Hyperlipidemia       History of Present Illness  Patient 52 y/o female with new dx of dm2 now on labs 2 weeks ago, started metformin er and tolerating; Also bp resistant to several meds;    Found    Aldosterone/Renin Ratio      0.0 - 30.0  >70.1 (H)      Started on spironolactone and bp dropped signifciantly;  she denies orthostatic symptoms;  Still fatigued and snores, seeing sleep specialist tomorrow.     Review of Systems   Constitutional:  Positive for fatigue.   Respiratory:  Negative for shortness of breath and wheezing.    Cardiovascular:  Negative for chest pain and palpitations.   Gastrointestinal:  Negative for abdominal pain.       Patient Active Problem List   Diagnosis    Mitral valve insufficiency    Palpitations    Essential hypertension    Tobacco use    Anxiety    Mixed hyperlipidemia    B12 deficiency    Smoker unmotivated to quit    IUD (intrauterine device) in place    Heme positive stool    Rectal bleeding    Colon polyp    Family history of colon cancer    SOB (shortness of breath)    Ganglion cyst -right thumb       Social History     Socioeconomic History    Marital status:      Spouse name: Roddy    Number of children: 3    Years of education: 12   Tobacco Use    Smoking status: Every Day     Current packs/day: 1.00     Average packs/day: 1 pack/day for 31.4 years (31.4 ttl pk-yrs)     Types: Cigarettes     Start date: 10/1/1992    Smokeless tobacco: Never    Tobacco comments:     I am currently trying to quit   Vaping Use    Vaping status: Never Used   Substance and Sexual Activity    Alcohol use: Yes     Comment: I only consume alcohol occasionally    Drug use: No    Sexual activity: Not Currently     Partners: Male     Birth control/protection: I.U.D.       Allergies   Allergen Reactions    Hydrocodone-Acetaminophen Itching       Current  "Outpatient Medications on File Prior to Visit   Medication Sig Dispense Refill    amLODIPine (NORVASC) 5 MG tablet TAKE ONE TABLET BY MOUTH DAILY 30 tablet 5    atorvastatin (LIPITOR) 40 MG tablet TAKE ONE TABLET BY MOUTH DAILY 90 tablet 3    baclofen (LIORESAL) 10 MG tablet Take 1 tablet by mouth 3 (Three) Times a Day As Needed for Muscle Spasms. 90 tablet 0    bisoprolol-hydrochlorothiazide (ZIAC) 10-6.25 MG per tablet TAKE ONE TABLET BY MOUTH DAILY 90 tablet 3    busPIRone (BUSPAR) 10 MG tablet TAKE ONE TABLET BY MOUTH TWICE A  tablet 3    FLUoxetine (PROzac) 20 MG capsule TAKE ONE CAPSULE BY MOUTH DAILY 90 capsule 3    metFORMIN ER (GLUCOPHAGE-XR) 500 MG 24 hr tablet Take 1 tablet by mouth Daily With Breakfast. 30 tablet 5    olmesartan-hydrochlorothiazide (BENICAR HCT) 40-12.5 MG per tablet Take 1 tablet by mouth Daily. 90 tablet 1    spironolactone (Aldactone) 50 MG tablet Take 1 tablet by mouth Daily. 30 tablet 3    varenicline (APO-Varenicline) 1 MG tablet Take 1 tablet by mouth 2 (Two) Times a Day. 60 tablet 3     No current facility-administered medications on file prior to visit.       Objective   Vitals:    03/06/24 0857   BP: 98/70   Pulse: 77   SpO2: 95%   Weight: 80.7 kg (178 lb)   Height: 157.5 cm (62\")     Body mass index is 32.56 kg/m².    Physical Exam  Vitals and nursing note reviewed.   Constitutional:       Appearance: She is well-developed.   HENT:      Head: Normocephalic and atraumatic.   Neck:      Thyroid: No thyromegaly.      Vascular: No JVD.   Cardiovascular:      Rate and Rhythm: Normal rate and regular rhythm.      Heart sounds: Normal heart sounds. No murmur heard.     No friction rub. No gallop.   Pulmonary:      Effort: Pulmonary effort is normal. No respiratory distress.      Breath sounds: Normal breath sounds. No wheezing or rales.   Abdominal:      General: Bowel sounds are normal. There is no distension.      Palpations: Abdomen is soft.      Tenderness: There is no " abdominal tenderness. There is no guarding or rebound.   Musculoskeletal:      Cervical back: Neck supple.   Feet:      Comments: Diabetic foot exam and monofilament completed, see scanned report.        Skin:     General: Skin is warm and dry.   Neurological:      Mental Status: She is alert.   Psychiatric:         Behavior: Behavior normal.       Component      Latest Ref Rng 2/14/2024   Glucose      70 - 99 mg/dL 126 (H)    BUN      6 - 24 mg/dL 8    Creatinine      0.57 - 1.00 mg/dL 0.84    EGFR Result      >59 mL/min/1.73 84    BUN/Creatinine Ratio      9 - 23  10    Sodium      134 - 144 mmol/L 142    Potassium      3.5 - 5.2 mmol/L 4.0    Chloride      96 - 106 mmol/L 101    CO2      20 - 29 mmol/L 26    Calcium      8.7 - 10.2 mg/dL 9.7    Total Protein      6.0 - 8.5 g/dL 6.5    Albumin      3.8 - 4.9 g/dL 4.6    Globulin      1.5 - 4.5 g/dL 1.9    A/G Ratio      1.2 - 2.2  2.4 (H)    Total Bilirubin      0.0 - 1.2 mg/dL 0.4    Alkaline Phosphatase      44 - 121 IU/L 91    AST (SGOT)      0 - 40 IU/L 36    ALT (SGPT)      0 - 32 IU/L 45 (H)    WBC      3.4 - 10.8 x10E3/uL 8.8    RBC      3.77 - 5.28 x10E6/uL 4.61    Hemoglobin      11.1 - 15.9 g/dL 14.4    Hematocrit      34.0 - 46.6 % 41.9    MCV      79 - 97 fL 91    MCH      26.6 - 33.0 pg 31.2    MCHC      31.5 - 35.7 g/dL 34.4    RDW      11.7 - 15.4 % 12.7    Platelets      150 - 450 x10E3/uL 345    Total Cholesterol      100 - 199 mg/dL 131    Triglycerides      0 - 149 mg/dL 227 (H)    HDL Cholesterol      >39 mg/dL 29 (L)    VLDL Cholesterol Rahat      5 - 40 mg/dL 37    LDL Cholesterol       0 - 99 mg/dL 65    Aldosterone      0.0 - 30.0 ng/dL 11.7    Renin Activity      0.167 - 5.380 ng/mL/hr <0.167 (L)    Aldosterone/Renin Ratio      0.0 - 30.0  >70.1 (H)    Hemoglobin A1C      4.8 - 5.6 % 6.8 (H)    Sed Rate      0 - 40 mm/hr 2       Legend:  (H) High  (L) Low  Assessment & Plan   Diagnoses and all orders for this visit:    1. Type 2 diabetes  mellitus without complication, without long-term current use of insulin (Primary)  -     glucose monitor monitoring kit; Check once daily dx non-iddm  Dispense: 1 each; Refill: 0  -     Lancets misc; Check once daily dx non-iddm  Dispense: 100 each; Refill: 3  -     Glucose Blood (Blood Glucose Test) strip; Check once daily  Dispense: 100 each; Refill: 3  -     Alcohol Swabs (Alcohol Pads) 70 % pads; Check once daily  Dispense: 100 each; Refill: 3    2. Resistant hypertension    3. Encounter for diabetic foot exam    4. Hyperaldosteronism    Will give meter, pending referral to dietician;  work on weigh tloss;  d/w glp1, but prefers metformin  BP much improved, consider dropping amlodipine if bp stays this low now;   responded well to aldactone  Next ov recheck A1c, bmp and lipid profile               -Follow up: 3 months and prn   Answers submitted by the patient for this visit:  Other (Submitted on 3/3/2024)  Please describe your symptoms.: This is a follow up from previous visit.  Have you had these symptoms before?: Yes  How long have you been having these symptoms?: Greater than 2 weeks  Please list any medications you are currently taking for this condition.: Fluoxetine HCI 20 mg, Bisoprolol-HCTZ 10-6.25 mg, Buspirone HCI 10 mg, Varenicline 1 mg, Atorvastatin 40 mg, Amlodipine Besylate 5 mg, Spironolactone 50 mg, Metformin  mg, Olmesartan-HCTZ 40 -12.5 mg  Please describe any probable cause for these symptoms. : Follow up visit from last visit and blood work  Primary Reason for Visit (Submitted on 3/3/2024)  What is the primary reason for your visit?: Other

## 2024-03-06 NOTE — PATIENT INSTRUCTIONS
Calorie Counting for Weight Loss  Calories are units of energy. Your body needs a certain number of calories from food to keep going throughout the day. When you eat or drink more calories than your body needs, your body stores the extra calories mostly as fat. When you eat or drink fewer calories than your body needs, your body burns fat to get the energy it needs.  Calorie counting means keeping track of how many calories you eat and drink each day. Calorie counting can be helpful if you need to lose weight. If you eat fewer calories than your body needs, you should lose weight. Ask your health care provider what a healthy weight is for you.  For calorie counting to work, you will need to eat the right number of calories each day to lose a healthy amount of weight per week. A dietitian can help you figure out how many calories you need in a day and will suggest ways to reach your calorie goal.  A healthy amount of weight to lose each week is usually 1-2 lb (0.5-0.9 kg). This usually means that your daily calorie intake should be reduced by 500-750 calories.  Eating 1,200-1,500 calories a day can help most women lose weight.  Eating 1,500-1,800 calories a day can help most men lose weight.  What do I need to know about calorie counting?  Work with your health care provider or dietitian to determine how many calories you should get each day. To meet your daily calorie goal, you will need to:  Find out how many calories are in each food that you would like to eat. Try to do this before you eat.  Decide how much of the food you plan to eat.  Keep a food log. Do this by writing down what you ate and how many calories it had.  To successfully lose weight, it is important to balance calorie counting with a healthy lifestyle that includes regular activity.  Where do I find calorie information?  The number of calories in a food can be found on a Nutrition Facts label. If a food does not have a Nutrition Facts label, try to  look up the calories online or ask your dietitian for help.  Remember that calories are listed per serving. If you choose to have more than one serving of a food, you will have to multiply the calories per serving by the number of servings you plan to eat. For example, the label on a package of bread might say that a serving size is 1 slice and that there are 90 calories in a serving. If you eat 1 slice, you will have eaten 90 calories. If you eat 2 slices, you will have eaten 180 calories.  How do I keep a food log?  After each time that you eat, record the following in your food log as soon as possible:  What you ate. Be sure to include toppings, sauces, and other extras on the food.  How much you ate. This can be measured in cups, ounces, or number of items.  How many calories were in each food and drink.  The total number of calories in the food you ate.  Keep your food log near you, such as in a pocket-sized notebook or on an hansel or website on your mobile phone. Some programs will calculate calories for you and show you how many calories you have left to meet your daily goal.  What are some portion-control tips?  Know how many calories are in a serving. This will help you know how many servings you can have of a certain food.  Use a measuring cup to measure serving sizes. You could also try weighing out portions on a kitchen scale. With time, you will be able to estimate serving sizes for some foods.  Take time to put servings of different foods on your favorite plates or in your favorite bowls and cups so you know what a serving looks like.  Try not to eat straight from a food's packaging, such as from a bag or box. Eating straight from the package makes it hard to see how much you are eating and can lead to overeating. Put the amount you would like to eat in a cup or on a plate to make sure you are eating the right portion.  Use smaller plates, glasses, and bowls for smaller portions and to prevent  overeating.  Try not to multitask. For example, avoid watching TV or using your computer while eating. If it is time to eat, sit down at a table and enjoy your food. This will help you recognize when you are full. It will also help you be more mindful of what and how much you are eating.  What are tips for following this plan?  Reading food labels  Check the calorie count compared with the serving size. The serving size may be smaller than what you are used to eating.  Check the source of the calories. Try to choose foods that are high in protein, fiber, and vitamins, and low in saturated fat, trans fat, and sodium.  Shopping  Read nutrition labels while you shop. This will help you make healthy decisions about which foods to buy.  Pay attention to nutrition labels for low-fat or fat-free foods. These foods sometimes have the same number of calories or more calories than the full-fat versions. They also often have added sugar, starch, or salt to make up for flavor that was removed with the fat.  Make a grocery list of lower-calorie foods and stick to it.  Cooking  Try to cook your favorite foods in a healthier way. For example, try baking instead of frying.  Use low-fat dairy products.  Meal planning  Use more fruits and vegetables. One-half of your plate should be fruits and vegetables.  Include lean proteins, such as chicken, turkey, and fish.  Lifestyle  Each week, aim to do one of the followin minutes of moderate exercise, such as walking.  75 minutes of vigorous exercise, such as running.  General information  Know how many calories are in the foods you eat most often. This will help you calculate calorie counts faster.  Find a way of tracking calories that works for you. Get creative. Try different apps or programs if writing down calories does not work for you.  What foods should I eat?    Eat nutritious foods. It is better to have a nutritious, high-calorie food, such as an avocado, than a food with  few nutrients, such as a bag of potato chips.  Use your calories on foods and drinks that will fill you up and will not leave you hungry soon after eating.  Examples of foods that fill you up are nuts and nut butters, vegetables, lean proteins, and high-fiber foods such as whole grains. High-fiber foods are foods with more than 5 g of fiber per serving.  Pay attention to calories in drinks. Low-calorie drinks include water and unsweetened drinks.  The items listed above may not be a complete list of foods and beverages you can eat. Contact a dietitian for more information.  What foods should I limit?  Limit foods or drinks that are not good sources of vitamins, minerals, or protein or that are high in unhealthy fats. These include:  Candy.  Other sweets.  Sodas, specialty coffee drinks, alcohol, and juice.  The items listed above may not be a complete list of foods and beverages you should avoid. Contact a dietitian for more information.  How do I count calories when eating out?  Pay attention to portions. Often, portions are much larger when eating out. Try these tips to keep portions smaller:  Consider sharing a meal instead of getting your own.  If you get your own meal, eat only half of it. Before you start eating, ask for a container and put half of your meal into it.  When available, consider ordering smaller portions from the menu instead of full portions.  Pay attention to your food and drink choices. Knowing the way food is cooked and what is included with the meal can help you eat fewer calories.  If calories are listed on the menu, choose the lower-calorie options.  Choose dishes that include vegetables, fruits, whole grains, low-fat dairy products, and lean proteins.  Choose items that are boiled, broiled, grilled, or steamed. Avoid items that are buttered, battered, fried, or served with cream sauce. Items labeled as crispy are usually fried, unless stated otherwise.  Choose water, low-fat milk,  unsweetened iced tea, or other drinks without added sugar. If you want an alcoholic beverage, choose a lower-calorie option, such as a glass of wine or light beer.  Ask for dressings, sauces, and syrups on the side. These are usually high in calories, so you should limit the amount you eat.  If you want a salad, choose a garden salad and ask for grilled meats. Avoid extra toppings such as whitaker, cheese, or fried items. Ask for the dressing on the side, or ask for olive oil and vinegar or lemon to use as dressing.  Estimate how many servings of a food you are given. Knowing serving sizes will help you be aware of how much food you are eating at restaurants.  Where to find more information  Centers for Disease Control and Prevention: www.cdc.gov  [place] Department of Agriculture: myplate.gov  Summary  Calorie counting means keeping track of how many calories you eat and drink each day. If you eat fewer calories than your body needs, you should lose weight.  A healthy amount of weight to lose per week is usually 1-2 lb (0.5-0.9 kg). This usually means reducing your daily calorie intake by 500-750 calories.  The number of calories in a food can be found on a Nutrition Facts label. If a food does not have a Nutrition Facts label, try to look up the calories online or ask your dietitian for help.  Use smaller plates, glasses, and bowls for smaller portions and to prevent overeating.  Use your calories on foods and drinks that will fill you up and not leave you hungry shortly after a meal.  This information is not intended to replace advice given to you by your health care provider. Make sure you discuss any questions you have with your health care provider.  Document Revised: 01/28/2021 Document Reviewed: 01/28/2021  Elsevier Patient Education © 2022 Else[employer].    Exercising to Lose Weight  Getting regular exercise is important for everyone. It is especially important if you are overweight. Being overweight increases  your risk of heart disease, stroke, diabetes, high blood pressure, and several types of cancer. Exercising, and reducing the calories you consume, can help you lose weight and improve fitness and health.  Exercise can be moderate or vigorous intensity. To lose weight, most people need to do a certain amount of moderate or vigorous-intensity exercise each week.  How can exercise affect me?  You lose weight when you exercise enough to burn more calories than you eat. Exercise also reduces body fat and builds muscle. The more muscle you have, the more calories you burn. Exercise also:  Improves mood.  Reduces stress and tension.  Improves your overall fitness, flexibility, and endurance.  Increases bone strength.  Moderate-intensity exercise  Moderate-intensity exercise is any activity that gets you moving enough to burn at least three times more energy (calories) than if you were sitting.  Examples of moderate exercise include:  Walking a mile in 15 minutes.  Doing light yard work.  Biking at an easy pace.  Most people should get at least 150 minutes of moderate-intensity exercise a week to maintain their body weight.  Vigorous-intensity exercise  Vigorous-intensity exercise is any activity that gets you moving enough to burn at least six times more calories than if you were sitting. When you exercise at this intensity, you should be working hard enough that you are not able to carry on a conversation.  Examples of vigorous exercise include:  Running.  Playing a team sport, such as football, basketball, and soccer.  Jumping rope.  Most people should get at least 75 minutes a week of vigorous exercise to maintain their body weight.  What actions can I take to lose weight?  The amount of exercise you need to lose weight depends on:  Your age.  The type of exercise.  Any health conditions you have.  Your overall physical ability.  Talk to your health care provider about how much exercise you need and what types of  activities are safe for you.  Nutrition    Make changes to your diet as told by your health care provider or diet and nutrition specialist (dietitian). This may include:  Eating fewer calories.  Eating more protein.  Eating less unhealthy fats.  Eating a diet that includes fresh fruits and vegetables, whole grains, low-fat dairy products, and lean protein.  Avoiding foods with added fat, salt, and sugar.  Drink plenty of water while you exercise to prevent dehydration or heat stroke.  Activity  Choose an activity that you enjoy and set realistic goals. Your health care provider can help you make an exercise plan that works for you.  Exercise at a moderate or vigorous intensity most days of the week.  The intensity of exercise may vary from person to person. You can tell how intense a workout is for you by paying attention to your breathing and heartbeat. Most people will notice their breathing and heartbeat get faster with more intense exercise.  Do resistance training twice each week, such as:  Push-ups.  Sit-ups.  Lifting weights.  Using resistance bands.  Getting short amounts of exercise can be just as helpful as long, structured periods of exercise. If you have trouble finding time to exercise, try doing these things as part of your daily routine:  Get up, stretch, and walk around every 30 minutes throughout the day.  Go for a walk during your lunch break.  Park your car farther away from your destination.  If you take public transportation, get off one stop early and walk the rest of the way.  Make phone calls while standing up and walking around.  Take the stairs instead of elevators or escalators.  Wear comfortable clothes and shoes with good support.  Do not exercise so much that you hurt yourself, feel dizzy, or get very short of breath.  Where to find more information  [place] Department of Health and Human Services: www.hhs.gov  Centers for Disease Control and Prevention: www.cdc.gov  Contact a health care  provider:  Before starting a new exercise program.  If you have questions or concerns about your weight.  If you have a medical problem that keeps you from exercising.  Get help right away if:  You have any of the following while exercising:  Injury.  Dizziness.  Difficulty breathing or shortness of breath that does not go away when you stop exercising.  Chest pain.  Rapid heartbeat.  These symptoms may represent a serious problem that is an emergency. Do not wait to see if the symptoms will go away. Get medical help right away. Call your local emergency services (911 in the [place] Do not drive yourself to the hospital.  Summary  Getting regular exercise is especially important if you are overweight.  Being overweight increases your risk of heart disease, stroke, diabetes, high blood pressure, and several types of cancer.  Losing weight happens when you burn more calories than you eat.  Reducing the amount of calories you eat, and getting regular moderate or vigorous exercise each week, helps you lose weight.  This information is not intended to replace advice given to you by your health care provider. Make sure you discuss any questions you have with your health care provider.  Document Revised: 02/13/2022 Document Reviewed: 02/13/2022  Elsevier Patient Education © 2022 Elsevier Inc.

## 2024-03-07 ENCOUNTER — OFFICE VISIT (OUTPATIENT)
Dept: SLEEP MEDICINE | Facility: HOSPITAL | Age: 54
End: 2024-03-07
Payer: COMMERCIAL

## 2024-03-07 VITALS — OXYGEN SATURATION: 96 % | HEIGHT: 62 IN | BODY MASS INDEX: 32.94 KG/M2 | WEIGHT: 179 LBS | HEART RATE: 72 BPM

## 2024-03-07 DIAGNOSIS — I1A.0 RESISTANT HYPERTENSION: ICD-10-CM

## 2024-03-07 DIAGNOSIS — R53.83 OTHER FATIGUE: ICD-10-CM

## 2024-03-07 DIAGNOSIS — G47.10 HYPERSOMNIA WITH SLEEP APNEA: Primary | ICD-10-CM

## 2024-03-07 DIAGNOSIS — G47.30 HYPERSOMNIA WITH SLEEP APNEA: Primary | ICD-10-CM

## 2024-03-07 LAB
COTININE UR QL SCN: POSITIVE NG/ML
Lab: ABNORMAL

## 2024-03-07 PROCEDURE — G0463 HOSPITAL OUTPT CLINIC VISIT: HCPCS

## 2024-03-10 PROBLEM — G47.30 HYPERSOMNIA WITH SLEEP APNEA: Status: ACTIVE | Noted: 2024-03-10

## 2024-03-10 PROBLEM — G47.10 HYPERSOMNIA WITH SLEEP APNEA: Status: ACTIVE | Noted: 2024-03-10

## 2024-03-26 DIAGNOSIS — E78.2 MIXED HYPERLIPIDEMIA: ICD-10-CM

## 2024-03-27 RX ORDER — ATORVASTATIN CALCIUM 40 MG/1
TABLET, FILM COATED ORAL
Qty: 90 TABLET | Refills: 1 | Status: SHIPPED | OUTPATIENT
Start: 2024-03-27

## 2024-04-03 ENCOUNTER — HOSPITAL ENCOUNTER (OUTPATIENT)
Dept: DIABETES SERVICES | Facility: HOSPITAL | Age: 54
Discharge: HOME | End: 2024-04-03
Admitting: FAMILY MEDICINE
Payer: COMMERCIAL

## 2024-04-03 PROCEDURE — G0109 DIAB MANAGE TRN IND/GROUP: HCPCS

## 2024-04-04 NOTE — NURSING NOTE
Pt attended DM ed class w/RN MERVAT at [facility] this evening. Pt actively participated in ed and learning process. Per ADA's standards of care, an assessment-ed note will be sent to chart thru med records at the completion of the program. Thank you for this referral. Please don't hesitate to contact us with any questions. Office no. [phone].

## 2024-04-10 ENCOUNTER — HOSPITAL ENCOUNTER (OUTPATIENT)
Dept: DIABETES SERVICES | Facility: HOSPITAL | Age: 54
Setting detail: RECURRING SERIES
Discharge: HOME | End: 2024-04-10
Payer: COMMERCIAL

## 2024-04-10 DIAGNOSIS — E11.9 TYPE 2 DIABETES MELLITUS WITHOUT COMPLICATION, WITHOUT LONG-TERM CURRENT USE OF INSULIN: Primary | ICD-10-CM

## 2024-04-10 PROCEDURE — G0109 DIAB MANAGE TRN IND/GROUP: HCPCS

## 2024-04-10 RX ORDER — SEMAGLUTIDE 0.68 MG/ML
INJECTION, SOLUTION SUBCUTANEOUS
Qty: 3 ML | Refills: 0 | Status: SHIPPED | OUTPATIENT
Start: 2024-04-10

## 2024-04-17 ENCOUNTER — APPOINTMENT (OUTPATIENT)
Dept: DIABETES SERVICES | Facility: HOSPITAL | Age: 54
End: 2024-04-17
Payer: COMMERCIAL

## 2024-04-25 ENCOUNTER — OFFICE VISIT (OUTPATIENT)
Dept: FAMILY MEDICINE CLINIC | Facility: CLINIC | Age: 54
End: 2024-04-25
Payer: COMMERCIAL

## 2024-04-25 VITALS
WEIGHT: 178 LBS | SYSTOLIC BLOOD PRESSURE: 100 MMHG | OXYGEN SATURATION: 98 % | HEIGHT: 62 IN | DIASTOLIC BLOOD PRESSURE: 66 MMHG | BODY MASS INDEX: 32.76 KG/M2 | HEART RATE: 72 BPM

## 2024-04-25 DIAGNOSIS — I73.9 CLAUDICATION: ICD-10-CM

## 2024-04-25 DIAGNOSIS — M54.42 ACUTE BILATERAL LOW BACK PAIN WITH BILATERAL SCIATICA: Primary | ICD-10-CM

## 2024-04-25 DIAGNOSIS — S22.31XA CLOSED FRACTURE OF ONE RIB OF RIGHT SIDE, INITIAL ENCOUNTER: ICD-10-CM

## 2024-04-25 DIAGNOSIS — M54.42 CHRONIC LEFT-SIDED LOW BACK PAIN WITH LEFT-SIDED SCIATICA: ICD-10-CM

## 2024-04-25 DIAGNOSIS — G89.29 CHRONIC LEFT-SIDED LOW BACK PAIN WITH LEFT-SIDED SCIATICA: ICD-10-CM

## 2024-04-25 DIAGNOSIS — R10.13 EPIGASTRIC ABDOMINAL PAIN: ICD-10-CM

## 2024-04-25 DIAGNOSIS — R07.89 CHEST WALL PAIN: ICD-10-CM

## 2024-04-25 DIAGNOSIS — M54.41 ACUTE BILATERAL LOW BACK PAIN WITH BILATERAL SCIATICA: Primary | ICD-10-CM

## 2024-04-25 RX ORDER — KETOROLAC TROMETHAMINE 30 MG/ML
60 INJECTION, SOLUTION INTRAMUSCULAR; INTRAVENOUS ONCE
Status: COMPLETED | OUTPATIENT
Start: 2024-04-25 | End: 2024-04-25

## 2024-04-25 RX ORDER — TRIAMCINOLONE ACETONIDE 40 MG/ML
80 INJECTION, SUSPENSION INTRA-ARTICULAR; INTRAMUSCULAR ONCE
Status: COMPLETED | OUTPATIENT
Start: 2024-04-25 | End: 2024-04-25

## 2024-04-25 RX ORDER — DICLOFENAC SODIUM AND MISOPROSTOL 75; 200 MG/1; UG/1
1 TABLET, DELAYED RELEASE ORAL 2 TIMES DAILY
Qty: 60 TABLET | Refills: 0 | Status: SHIPPED | OUTPATIENT
Start: 2024-04-25

## 2024-04-25 RX ORDER — BACLOFEN 10 MG/1
10 TABLET ORAL 3 TIMES DAILY PRN
Qty: 90 TABLET | Refills: 0 | Status: SHIPPED | OUTPATIENT
Start: 2024-04-25

## 2024-04-25 RX ORDER — FAMOTIDINE 40 MG/1
40 TABLET, FILM COATED ORAL NIGHTLY
Qty: 30 TABLET | Refills: 5 | Status: SHIPPED | OUTPATIENT
Start: 2024-04-25

## 2024-04-25 RX ADMIN — KETOROLAC TROMETHAMINE 60 MG: 30 INJECTION, SOLUTION INTRAMUSCULAR; INTRAVENOUS at 09:08

## 2024-04-25 RX ADMIN — TRIAMCINOLONE ACETONIDE 80 MG: 40 INJECTION, SUSPENSION INTRA-ARTICULAR; INTRAMUSCULAR at 09:09

## 2024-04-25 NOTE — PROGRESS NOTES
Subjective   Myriam Thomas is a 53 y.o. female. Presents today for   Chief Complaint   Patient presents with    Back Pain    Leg Pain     Bilateral        History of Present Illness  Patient 54 y/o female slipped and fell on deck 3 to 4 weeks ago down deck steps onto back/bottom;   Pain more shoulder/back of chest wall and also low back hurts nonstop.  Also having abd pain now generalized, has been taking apap and ibuprofen regularly.   Taking left over baclofen and extra at night as pain wakes up.    Chest wall pain hurts with deep breath and cough.    Reports legs hurt (calves) when walk, feel heavy;  is back to smoking;  has cut back.      Review of Systems   Constitutional:  Negative for chills and fever.   Respiratory:  Negative for shortness of breath.    Cardiovascular:  Positive for chest pain. Negative for palpitations.   Musculoskeletal:  Positive for arthralgias, back pain, gait problem and myalgias.       Patient Active Problem List   Diagnosis    Mitral valve insufficiency    Palpitations    Essential hypertension    Tobacco use    Anxiety    Mixed hyperlipidemia    B12 deficiency    Smoker unmotivated to quit    IUD (intrauterine device) in place    Heme positive stool    Rectal bleeding    Colon polyp    Family history of colon cancer    SOB (shortness of breath)    Ganglion cyst -right thumb    Hypersomnia with sleep apnea       Social History     Socioeconomic History    Marital status:      Spouse name: Roddy    Number of children: 3    Years of education: 12   Tobacco Use    Smoking status: Every Day     Current packs/day: 1.00     Average packs/day: 1 pack/day for 32.3 years (32.3 ttl pk-yrs)     Types: Cigarettes     Start date: 1992    Smokeless tobacco: Never    Tobacco comments:     I am currently trying to quit   Vaping Use    Vaping status: Never Used   Substance and Sexual Activity    Alcohol use: Yes     Comment: I only consume alcohol occasionally    Drug use: No    Sexual  "activity: Not Currently     Partners: Male     Birth control/protection: I.U.D.       Allergies   Allergen Reactions    Hydrocodone-Acetaminophen Itching       Current Outpatient Medications on File Prior to Visit   Medication Sig Dispense Refill    Alcohol Swabs (Alcohol Pads) 70 % pads Check once daily 100 each 3    amLODIPine (NORVASC) 5 MG tablet TAKE ONE TABLET BY MOUTH DAILY 30 tablet 5    atorvastatin (LIPITOR) 40 MG tablet TAKE ONE TABLET BY MOUTH DAILY 90 tablet 1    bisoprolol-hydrochlorothiazide (ZIAC) 10-6.25 MG per tablet TAKE ONE TABLET BY MOUTH DAILY 90 tablet 3    busPIRone (BUSPAR) 10 MG tablet TAKE ONE TABLET BY MOUTH TWICE A  tablet 3    FLUoxetine (PROzac) 20 MG capsule TAKE ONE CAPSULE BY MOUTH DAILY 90 capsule 3    Glucose Blood (Blood Glucose Test) strip Check once daily 100 each 3    glucose monitor monitoring kit Check once daily dx non-iddm 1 each 0    Lancets misc Check once daily dx non-iddm 100 each 3    metFORMIN ER (GLUCOPHAGE-XR) 500 MG 24 hr tablet Take 1 tablet by mouth Daily With Breakfast. 30 tablet 5    olmesartan-hydrochlorothiazide (BENICAR HCT) 40-12.5 MG per tablet Take 1 tablet by mouth Daily. 90 tablet 1    Semaglutide,0.25 or 0.5MG/DOS, (Ozempic, 0.25 or 0.5 MG/DOSE,) 2 MG/3ML solution pen-injector 0.25mg weekly x 2 weeks then 0.5mg weekly x 4 weeks.  Call for next dose 3 mL 0    spironolactone (Aldactone) 50 MG tablet Take 1 tablet by mouth Daily. 30 tablet 3    varenicline (APO-Varenicline) 1 MG tablet Take 1 tablet by mouth 2 (Two) Times a Day. 60 tablet 3    [DISCONTINUED] baclofen (LIORESAL) 10 MG tablet Take 1 tablet by mouth 3 (Three) Times a Day As Needed for Muscle Spasms. 90 tablet 0     No current facility-administered medications on file prior to visit.       Objective   Vitals:    04/25/24 0813   BP: 100/66   Pulse: 72   SpO2: 98%   Weight: 80.7 kg (178 lb)   Height: 157.5 cm (62\")     Body mass index is 32.56 kg/m².    Physical Exam  Vitals and nursing " note reviewed.   Constitutional:       Appearance: She is well-developed.   HENT:      Head: Normocephalic and atraumatic.   Neck:      Thyroid: No thyromegaly.      Vascular: No JVD.   Cardiovascular:      Rate and Rhythm: Normal rate and regular rhythm.      Heart sounds: Normal heart sounds. No murmur heard.     No friction rub. No gallop.   Pulmonary:      Effort: Pulmonary effort is normal. No respiratory distress.      Breath sounds: Normal breath sounds. No wheezing or rales.   Abdominal:      General: Bowel sounds are normal. There is no distension.      Palpations: Abdomen is soft.      Tenderness: There is no abdominal tenderness. There is no guarding or rebound.   Musculoskeletal:      Cervical back: Neck supple.        Back:    Skin:     General: Skin is warm and dry.   Neurological:      Mental Status: She is alert.   Psychiatric:         Behavior: Behavior normal.     XRAY: cxr with rib series  INDICATION:  chest wall pain after fall  Wet read:  on oblique ? Hairline fx rib 4; lungs clear  no Comparative data  Imagine independently viewed by myself  Radiology reading pending.  XRAY: lumbar spine  INDICATION:  fall with bony pain  Wet read:  neg fx  no Comparative data  Imagine independently viewed by myself  Radiology reading pending.      Assessment & Plan   Diagnoses and all orders for this visit:    1. Acute bilateral low back pain with bilateral sciatica (Primary)  -     XR Spine Lumbar 2 or 3 View (In Office)  -     diclofenac-miSOPROStol (ARTHROTEC 75) 75-0.2 MG EC tablet; Take 1 tablet by mouth 2 (Two) Times a Day.  Dispense: 60 tablet; Refill: 0  -     baclofen (LIORESAL) 10 MG tablet; Take 1 tablet by mouth 3 (Three) Times a Day As Needed for Muscle Spasms.  Dispense: 90 tablet; Refill: 0  -     ketorolac (TORADOL) injection 60 mg  -     triamcinolone acetonide (KENALOG-40) injection 80 mg    2. Chest wall pain  -     XR Ribs Right With PA Chest  -     diclofenac-miSOPROStol (ARTHROTEC 75)  75-0.2 MG EC tablet; Take 1 tablet by mouth 2 (Two) Times a Day.  Dispense: 60 tablet; Refill: 0  -     baclofen (LIORESAL) 10 MG tablet; Take 1 tablet by mouth 3 (Three) Times a Day As Needed for Muscle Spasms.  Dispense: 90 tablet; Refill: 0    3. Claudication  -     Doppler Ankle Brachial Index Single Level CAR; Future    4. Closed fracture of one rib of right side, initial encounter  -     diclofenac-miSOPROStol (ARTHROTEC 75) 75-0.2 MG EC tablet; Take 1 tablet by mouth 2 (Two) Times a Day.  Dispense: 60 tablet; Refill: 0    5. Chronic left-sided low back pain with left-sided sciatica    6. Epigastric abdominal pain  -     famotidine (PEPCID) 40 MG tablet; Take 1 tablet by mouth Every Night.  Dispense: 30 tablet; Refill: 5    Will add pepcid to take while on nsaid and try arthrotec that has PG as suspect frequent nsaids cuasing epigastric pain;     Has pain in legs, but describes claudication and she is a +smoker with htn, dm2, screen for pvd;  d/w pseudocladucation;  will try IM injections and oral    Recommend quit smoking  Due lung cancer screening after 6/25/24, order next ov               -Follow up:6/20/24

## 2024-04-27 ENCOUNTER — TELEPHONE (OUTPATIENT)
Dept: FAMILY MEDICINE CLINIC | Facility: CLINIC | Age: 54
End: 2024-04-27
Payer: COMMERCIAL

## 2024-04-27 NOTE — TELEPHONE ENCOUNTER
Any tailbone pain?  Tailbone not really seen well as not looking at, but cannto tell if fracture.  Have patient do 2 view sacral xr  dx back pain to evaluate. RRJ

## 2024-04-29 DIAGNOSIS — M54.50 LOW BACK PAIN, UNSPECIFIED BACK PAIN LATERALITY, UNSPECIFIED CHRONICITY, UNSPECIFIED WHETHER SCIATICA PRESENT: Primary | ICD-10-CM

## 2024-05-15 ENCOUNTER — HOSPITAL ENCOUNTER (OUTPATIENT)
Dept: DIABETES SERVICES | Facility: HOSPITAL | Age: 54
Setting detail: RECURRING SERIES
Discharge: HOME | End: 2024-05-15
Payer: COMMERCIAL

## 2024-05-15 PROCEDURE — G0109 DIAB MANAGE TRN IND/GROUP: HCPCS

## 2024-05-16 NOTE — PROGRESS NOTES
Diabetes Education    Patient Name:  Myriam Thomas  YOB: 1970  MRN: [id number]  Admit Date:  5/15/2024    Ms. Thomas attended the final Diabetes Education class taught by the Registered Dietitian. Consistent with ADA's standards of care, a comprehensive assessment/training record and patient goals have been sent to medical records (see media tab).     Ms. Thomas has been encouraged to call our office with questions or additional education needs. Please place referral for additional services.    Thank you for the referral.     Annie Finney RD, LD, CDCES       Electronically signed by:  Annie Finney RD  05/16/24 09:59 EDT

## 2024-05-27 DIAGNOSIS — E11.9 TYPE 2 DIABETES MELLITUS WITHOUT COMPLICATION, WITHOUT LONG-TERM CURRENT USE OF INSULIN: ICD-10-CM

## 2024-05-28 RX ORDER — SEMAGLUTIDE 0.68 MG/ML
INJECTION, SOLUTION SUBCUTANEOUS
Qty: 3 ML | Refills: 0 | Status: SHIPPED | OUTPATIENT
Start: 2024-05-28

## 2024-06-04 ENCOUNTER — HOSPITAL ENCOUNTER (OUTPATIENT)
Dept: CARDIOLOGY | Facility: HOSPITAL | Age: 54
Discharge: HOME | End: 2024-06-04
Admitting: FAMILY MEDICINE
Payer: COMMERCIAL

## 2024-06-04 DIAGNOSIS — I73.9 CLAUDICATION: ICD-10-CM

## 2024-06-04 LAB
BH CV LOWER ARTERIAL LEFT ABI RATIO: 1.06
BH CV LOWER ARTERIAL LEFT DORSALIS PEDIS SYS MAX: 110
BH CV LOWER ARTERIAL LEFT GREAT TOE SYS MAX: 74
BH CV LOWER ARTERIAL LEFT POST TIBIAL SYS MAX: 116
BH CV LOWER ARTERIAL LEFT TBI RATIO: 0.68
BH CV LOWER ARTERIAL RIGHT ABI RATIO: 1.06
BH CV LOWER ARTERIAL RIGHT DORSALIS PEDIS SYS MAX: 105
BH CV LOWER ARTERIAL RIGHT GREAT TOE SYS MAX: 96
BH CV LOWER ARTERIAL RIGHT POST TIBIAL SYS MAX: 115
BH CV LOWER ARTERIAL RIGHT TBI RATIO: 0.88
UPPER ARTERIAL LEFT ARM BRACHIAL SYS MAX: 101
UPPER ARTERIAL RIGHT ARM BRACHIAL SYS MAX: 109

## 2024-06-04 PROCEDURE — 93922 UPR/L XTREMITY ART 2 LEVELS: CPT | Performed by: STUDENT IN AN ORGANIZED HEALTH CARE EDUCATION/TRAINING PROGRAM

## 2024-06-04 PROCEDURE — 93922 UPR/L XTREMITY ART 2 LEVELS: CPT

## 2024-06-09 NOTE — PROGRESS NOTES
Call results to patient.  Arterial screen normal for legs, though very mild in toes.  Would not cause pain, but early change in toes would recommend quit smoking before worsens.  Continue statin and I would also start aspirin 81mg 1 po daily

## 2024-06-10 ENCOUNTER — HOSPITAL ENCOUNTER (OUTPATIENT)
Dept: BONE DENSITY | Facility: HOSPITAL | Age: 54
Discharge: HOME | End: 2024-06-10
Admitting: OBSTETRICS & GYNECOLOGY
Payer: COMMERCIAL

## 2024-06-10 DIAGNOSIS — N95.1 PERIMENOPAUSAL: ICD-10-CM

## 2024-06-10 PROCEDURE — 77080 DXA BONE DENSITY AXIAL: CPT

## 2024-06-12 ENCOUNTER — TELEPHONE (OUTPATIENT)
Dept: OBSTETRICS AND GYNECOLOGY | Facility: CLINIC | Age: 54
End: 2024-06-12
Payer: COMMERCIAL

## 2024-06-20 ENCOUNTER — OFFICE VISIT (OUTPATIENT)
Dept: FAMILY MEDICINE CLINIC | Facility: CLINIC | Age: 54
End: 2024-06-20
Payer: COMMERCIAL

## 2024-06-20 VITALS
SYSTOLIC BLOOD PRESSURE: 90 MMHG | OXYGEN SATURATION: 96 % | WEIGHT: 176 LBS | BODY MASS INDEX: 32.39 KG/M2 | DIASTOLIC BLOOD PRESSURE: 60 MMHG | HEIGHT: 62 IN | HEART RATE: 71 BPM

## 2024-06-20 DIAGNOSIS — E78.2 MIXED HYPERLIPIDEMIA: ICD-10-CM

## 2024-06-20 DIAGNOSIS — R00.2 PALPITATIONS: ICD-10-CM

## 2024-06-20 DIAGNOSIS — I73.9 PVD (PERIPHERAL VASCULAR DISEASE): ICD-10-CM

## 2024-06-20 DIAGNOSIS — I1A.0 RESISTANT HYPERTENSION: ICD-10-CM

## 2024-06-20 DIAGNOSIS — E11.9 TYPE 2 DIABETES MELLITUS WITHOUT COMPLICATION, WITHOUT LONG-TERM CURRENT USE OF INSULIN: Primary | ICD-10-CM

## 2024-06-20 DIAGNOSIS — R11.0 NAUSEA: ICD-10-CM

## 2024-06-20 PROCEDURE — 99214 OFFICE O/P EST MOD 30 MIN: CPT | Performed by: FAMILY MEDICINE

## 2024-06-20 RX ORDER — ONDANSETRON 4 MG/1
4 TABLET, ORALLY DISINTEGRATING ORAL EVERY 8 HOURS PRN
Qty: 24 TABLET | Refills: 0 | Status: SHIPPED | OUTPATIENT
Start: 2024-06-20

## 2024-06-20 RX ORDER — ASPIRIN 81 MG/1
81 TABLET ORAL DAILY
Start: 2024-06-20

## 2024-06-20 RX ORDER — BISOPROLOL FUMARATE 5 MG/1
5 TABLET, FILM COATED ORAL DAILY
Qty: 90 TABLET | Refills: 3 | Status: SHIPPED | OUTPATIENT
Start: 2024-06-20

## 2024-06-20 NOTE — PROGRESS NOTES
Subjective   Myriam Thomas is a 53 y.o. female. Presents today for   Chief Complaint   Patient presents with    Diabetes    Hypertension    Hyperlipidemia     Answers submitted by the patient for this visit:  Other (Submitted on 6/13/2024)  Please describe your symptoms.: This is follow up to my March visit  Have you had these symptoms before?: Yes  How long have you been having these symptoms?: Greater than 2 weeks  Primary Reason for Visit (Submitted on 6/13/2024)  What is the primary reason for your visit?: Other    History of Present Illness  Patient 54 y/o female with dm2, htn and hld + smoker reports severe nausea and vomiting, forgot meds better nausea and vomiting stopped.   On still ozempic but stayed 0.25mg as nauseated.  Missed 1 dose when sick.   Smoking, but cutting back and 1 pp 3 to 4 days.   Had PADMINI and noted left toes digital ischemia.  Also feeling more fatigued.  Had padmini and noted left toe ischemia, but o/w ok.   Had start asa, encouraged drop smoking and would keep LDL <70, recent check 65.    Aldosterone/renin ratio was high and checked as resistant hypertension despite several meds, added spironolactone and and now doing much better, but running 90/60s.   She is on bisoprolol/hctz 10/6.25mg for BP and was having palpiations (has seen cardiology with workup).  Currently no palpitations.    Review of Systems   Constitutional:  Positive for fatigue.   Respiratory:  Negative for shortness of breath and wheezing.    Cardiovascular:  Negative for chest pain and palpitations.   Gastrointestinal:  Positive for nausea and vomiting. Negative for abdominal pain.   Neurological:  Positive for light-headedness.       Patient Active Problem List   Diagnosis    Mitral valve insufficiency    Palpitations    Essential hypertension    Tobacco use    Anxiety    Mixed hyperlipidemia    B12 deficiency    Smoker unmotivated to quit    IUD (intrauterine device) in place    Heme positive stool    Rectal bleeding     Colon polyp    Family history of colon cancer    SOB (shortness of breath)    Ganglion cyst -right thumb    Hypersomnia with sleep apnea    PVD (peripheral vascular disease) -left toes digital ischemia       Social History     Socioeconomic History    Marital status:      Spouse name: Roddy    Number of children: 3    Years of education: 12   Tobacco Use    Smoking status: Every Day     Current packs/day: 1.00     Average packs/day: 1 pack/day for 32.5 years (32.5 ttl pk-yrs)     Types: Cigarettes     Start date: 1992    Smokeless tobacco: Never    Tobacco comments:     I am currently trying to quit   Vaping Use    Vaping status: Never Used   Substance and Sexual Activity    Alcohol use: Yes     Comment: I only consume alcohol occasionally    Drug use: No    Sexual activity: Not Currently     Partners: Male     Birth control/protection: I.U.D.       Allergies   Allergen Reactions    Hydrocodone-Acetaminophen Itching    Metformin Nausea And Vomiting       Current Outpatient Medications on File Prior to Visit   Medication Sig Dispense Refill    Alcohol Swabs (Alcohol Pads) 70 % pads Check once daily 100 each 3    amLODIPine (NORVASC) 5 MG tablet TAKE ONE TABLET BY MOUTH DAILY 30 tablet 5    atorvastatin (LIPITOR) 40 MG tablet TAKE ONE TABLET BY MOUTH DAILY 90 tablet 1    baclofen (LIORESAL) 10 MG tablet Take 1 tablet by mouth 3 (Three) Times a Day As Needed for Muscle Spasms. 90 tablet 0    busPIRone (BUSPAR) 10 MG tablet TAKE ONE TABLET BY MOUTH TWICE A  tablet 3    famotidine (PEPCID) 40 MG tablet Take 1 tablet by mouth Every Night. 30 tablet 5    FLUoxetine (PROzac) 20 MG capsule TAKE ONE CAPSULE BY MOUTH DAILY 90 capsule 3    Glucose Blood (Blood Glucose Test) strip Check once daily 100 each 3    glucose monitor monitoring kit Check once daily dx non-iddm 1 each 0    Lancets misc Check once daily dx non-iddm 100 each 3    olmesartan-hydrochlorothiazide (BENICAR HCT) 40-12.5 MG per tablet Take 1  "tablet by mouth Daily. 90 tablet 1    Semaglutide,0.25 or 0.5MG/DOS, (Ozempic, 0.25 or 0.5 MG/DOSE,) 2 MG/3ML solution pen-injector DIAL AND INJECT UNDER THE SKIN 0.25 MG WEEKLY FOR 4 WEEKS THEN DIAL AND INJECT 0.5 MG WEEKLY THEREAFTER 3 mL 0    spironolactone (Aldactone) 50 MG tablet Take 1 tablet by mouth Daily. 30 tablet 3    varenicline (APO-Varenicline) 1 MG tablet Take 1 tablet by mouth 2 (Two) Times a Day. 60 tablet 3    [DISCONTINUED] bisoprolol-hydrochlorothiazide (ZIAC) 10-6.25 MG per tablet TAKE ONE TABLET BY MOUTH DAILY 90 tablet 3    [DISCONTINUED] metFORMIN ER (GLUCOPHAGE-XR) 500 MG 24 hr tablet Take 1 tablet by mouth Daily With Breakfast. 30 tablet 5    [DISCONTINUED] diclofenac-miSOPROStol (ARTHROTEC 75) 75-0.2 MG EC tablet Take 1 tablet by mouth 2 (Two) Times a Day. 60 tablet 0     No current facility-administered medications on file prior to visit.       Objective   Vitals:    06/20/24 0829   BP: 90/60   Pulse: 71   SpO2: 96%   Weight: 79.8 kg (176 lb)   Height: 157.5 cm (62\")     Body mass index is 32.19 kg/m².    Physical Exam  Noninvasive physiologic studies of upper/lower extremity arteries, single level, bilateral (eg, ankle/brachial indices, Doppler waveform analysis...)    Accession Number: [id number]   Date of Study: 6/4/24   Ordering Provider: Lizandro Barahona DO   Clinical Indications: claudication        [facility]  Performing Staff   Cardiology: Geri Chandler MD    Tech: Yvonne Mcneill RVS        Clinical Indication    claudication   Dx: Claudication [I73.9 (ICD-10-CM)]     Interpretation Summary         Right Conclusion: The right PADMINI is normal. Normal digital pressures.    Left Conclusion: The left PADMINI is normal. Mild digital insufficiency.     Patient Hx Of Height, Weight, and Vitals    Height Weight BSA (Calculated - sq m) BMI (Calculated) Retired BMI (kg/m2) Pulse BP              Study Findings    Right Sided Findings:       Right Posterior Tibial: The pulse " is detected w/ Doppler. Multiphasic, high resistive arterial flow noted.        Right Dorsalis Pedis: The pulse is detected w/ Doppler. Multiphasic, high resistive arterial flow noted.       Left Sided Findings:       Left Posterior Tibial: The pulse is detected w/ Doppler. Multiphasic, high resistive arterial flow noted.        Left Dorsalis Pedis: The pulse is detected w/ Doppler. Multiphasic, high resistive arterial flow noted.     Study Impression    Right Conclusion: The right PADMINI is normal. Normal digital pressures.     Left Conclusion: The left PADMINI is normal. Mild digital ischemia.     Assessment & Plan   Diagnoses and all orders for this visit:    1. Type 2 diabetes mellitus without complication, without long-term current use of insulin (Primary)  -     bisoprolol (ZEBeta) 5 MG tablet; Take 1 tablet by mouth Daily.  Dispense: 90 tablet; Refill: 3    2. Mixed hyperlipidemia    3. Resistant hypertension    4. Nausea  -     ondansetron ODT (ZOFRAN-ODT) 4 MG disintegrating tablet; Place 1 tablet on the tongue Every 8 (Eight) Hours As Needed for Nausea or Vomiting.  Dispense: 24 tablet; Refill: 0    5. Palpitations  -     bisoprolol (ZEBeta) 5 MG tablet; Take 1 tablet by mouth Daily.  Dispense: 90 tablet; Refill: 3    6. PVD (peripheral vascular disease) -left toes digital ischemia  -     aspirin 81 MG EC tablet; Take 1 tablet by mouth Daily.    D/C metfomrin and will continue ozempic for now at low dose, may titrate if nausea resolves.;   Try ginger as directed and will send zofran in case needs  BP low normal and symptomatic, much improved adding aldactone, will change bisoprolol hctz 10/6.25mg to bisoprolol 5mg only and see if less fatigue/light headed sensation.  Continue work on smoking cessation  Check A1c, cmp, lipid profile at next ov                 -Follow up: 2 months and prn

## 2024-06-27 ENCOUNTER — TELEPHONE (OUTPATIENT)
Dept: GASTROENTEROLOGY | Facility: CLINIC | Age: 54
End: 2024-06-27
Payer: COMMERCIAL

## 2024-06-27 NOTE — TELEPHONE ENCOUNTER
Caller: Myriam Thomas    Relationship to patient: Self    Best call back number: [phone]    Chief complaint: RESCHEDULE COLONOSCOPY     Type of visit: GASTRO PROCEDURE     If rescheduling, when is the original appointment: 07/30/24     Additional notes: PATIENT CANNOT MAKE THIS DATE. PLEASE REVIEW AND CONTACT PATIENT TO SCHEDULE.

## 2024-06-28 ENCOUNTER — TELEPHONE (OUTPATIENT)
Dept: GASTROENTEROLOGY | Facility: CLINIC | Age: 54
End: 2024-06-28
Payer: COMMERCIAL

## 2024-06-28 NOTE — TELEPHONE ENCOUNTER
FERN KIM IN SCHEDULING PT R/S 10/17/2024 ARRIVING AT 1:45PM [facility] PREP INSTRUCTIONS PLACED IN PT MY CHART.OK FOR HUB TO RELAY

## 2024-07-01 RX ORDER — SPIRONOLACTONE 50 MG/1
50 TABLET, FILM COATED ORAL DAILY
Qty: 30 TABLET | Refills: 3 | Status: SHIPPED | OUTPATIENT
Start: 2024-07-01

## 2024-07-02 DIAGNOSIS — Z12.2 ENCOUNTER FOR SCREENING FOR LUNG CANCER: Primary | ICD-10-CM

## 2024-07-12 DIAGNOSIS — E11.9 TYPE 2 DIABETES MELLITUS WITHOUT COMPLICATION, WITHOUT LONG-TERM CURRENT USE OF INSULIN: ICD-10-CM

## 2024-07-12 RX ORDER — SEMAGLUTIDE 0.68 MG/ML
INJECTION, SOLUTION SUBCUTANEOUS
Qty: 3 ML | Refills: 0 | Status: SHIPPED | OUTPATIENT
Start: 2024-07-12

## 2024-07-18 ENCOUNTER — HOSPITAL ENCOUNTER (OUTPATIENT)
Dept: CT IMAGING | Facility: HOSPITAL | Age: 54
Discharge: HOME | End: 2024-07-18
Admitting: FAMILY MEDICINE
Payer: COMMERCIAL

## 2024-07-18 DIAGNOSIS — Z12.2 ENCOUNTER FOR SCREENING FOR LUNG CANCER: ICD-10-CM

## 2024-07-18 PROCEDURE — 71271 CT THORAX LUNG CANCER SCR C-: CPT

## 2024-07-22 DIAGNOSIS — I10 ESSENTIAL HYPERTENSION: ICD-10-CM

## 2024-07-22 RX ORDER — OLMESARTAN MEDOXOMIL AND HYDROCHLOROTHIAZIDE 40/12.5 40; 12.5 MG/1; MG/1
1 TABLET ORAL DAILY
Qty: 90 TABLET | Refills: 1 | Status: SHIPPED | OUTPATIENT
Start: 2024-07-22

## 2024-07-26 ENCOUNTER — TELEPHONE (OUTPATIENT)
Dept: FAMILY MEDICINE CLINIC | Facility: CLINIC | Age: 54
End: 2024-07-26
Payer: COMMERCIAL

## 2024-07-26 NOTE — TELEPHONE ENCOUNTER
Caller: Myriam Thomas    Relationship: Self    Best call back number: [phone]     What is the best time to reach you: ANY    Who are you requesting to speak with (clinical staff, provider,  specific staff member): ANGELA    What was the call regarding: PATIENT HAS QUESTIONS ABOUT HER TEST RESULTS FROM LUNG SCAN ON 07/18/2024.     PATIENT STATED THERE WERE NOTES FROM DR MANCINI ABOUT A NODULE FOUND AND WOULD LIKE TO DISCUSS THOSE WITH ANGELA.    PLEASE CONTACT PATIENT TO DISCUSS AND ADVISE.     Is it okay if the provider responds through MyChart: YES

## 2024-07-26 NOTE — TELEPHONE ENCOUNTER
Pt is aware the repeat is to make sure nodule is stable or if changed in size and there is nothing to do at this time.

## 2024-08-27 DIAGNOSIS — E11.9 TYPE 2 DIABETES MELLITUS WITHOUT COMPLICATION, WITHOUT LONG-TERM CURRENT USE OF INSULIN: ICD-10-CM

## 2024-08-27 RX ORDER — SEMAGLUTIDE 0.68 MG/ML
INJECTION, SOLUTION SUBCUTANEOUS
Qty: 3 ML | Refills: 0 | Status: SHIPPED | OUTPATIENT
Start: 2024-08-27 | End: 2024-08-29

## 2024-08-29 ENCOUNTER — OFFICE VISIT (OUTPATIENT)
Dept: FAMILY MEDICINE CLINIC | Facility: CLINIC | Age: 54
End: 2024-08-29
Payer: COMMERCIAL

## 2024-08-29 VITALS
SYSTOLIC BLOOD PRESSURE: 120 MMHG | HEART RATE: 76 BPM | WEIGHT: 172 LBS | HEIGHT: 62 IN | DIASTOLIC BLOOD PRESSURE: 78 MMHG | OXYGEN SATURATION: 98 % | BODY MASS INDEX: 31.65 KG/M2

## 2024-08-29 DIAGNOSIS — E11.9 TYPE 2 DIABETES MELLITUS WITHOUT COMPLICATION, WITHOUT LONG-TERM CURRENT USE OF INSULIN: Primary | ICD-10-CM

## 2024-08-29 DIAGNOSIS — R53.83 OTHER FATIGUE: ICD-10-CM

## 2024-08-29 DIAGNOSIS — R91.1 PULMONARY NODULE: ICD-10-CM

## 2024-08-29 DIAGNOSIS — E78.2 MIXED HYPERLIPIDEMIA: ICD-10-CM

## 2024-08-29 DIAGNOSIS — R11.0 NAUSEA: ICD-10-CM

## 2024-08-29 DIAGNOSIS — I10 ESSENTIAL HYPERTENSION: ICD-10-CM

## 2024-08-29 DIAGNOSIS — F17.210 CIGARETTE NICOTINE DEPENDENCE WITHOUT COMPLICATION: ICD-10-CM

## 2024-08-29 PROCEDURE — 99214 OFFICE O/P EST MOD 30 MIN: CPT | Performed by: FAMILY MEDICINE

## 2024-08-29 RX ORDER — ATORVASTATIN CALCIUM 40 MG/1
40 TABLET, FILM COATED ORAL DAILY
Qty: 90 TABLET | Refills: 1 | Status: SHIPPED | OUTPATIENT
Start: 2024-08-29

## 2024-08-29 RX ORDER — VARENICLINE TARTRATE 1 MG/1
1 TABLET, FILM COATED ORAL 2 TIMES DAILY
Qty: 60 TABLET | Refills: 3 | Status: SHIPPED | OUTPATIENT
Start: 2024-08-29

## 2024-08-29 RX ORDER — OLMESARTAN MEDOXOMIL AND HYDROCHLOROTHIAZIDE 40/25 40; 25 MG/1; MG/1
1 TABLET ORAL DAILY
Qty: 90 TABLET | Refills: 3 | Status: SHIPPED | OUTPATIENT
Start: 2024-08-29

## 2024-08-29 RX ORDER — ONDANSETRON 4 MG/1
4 TABLET, ORALLY DISINTEGRATING ORAL EVERY 8 HOURS PRN
Qty: 24 TABLET | Refills: 2 | Status: SHIPPED | OUTPATIENT
Start: 2024-08-29

## 2024-08-29 NOTE — PROGRESS NOTES
Subjective   Myriam Thomas is a 53 y.o. female. Presents today for   Chief Complaint   Patient presents with    Diabetes    Hyperlipidemia    Anxiety    Hypertension     Answers submitted by the patient for this visit:  Other (Submitted on 8/27/2024)  Please describe your symptoms.: Follow up appointment  Have you had these symptoms before?: No  How long have you been having these symptoms?: Greater than 2 weeks  Please list any medications you are currently taking for this condition.: Aspirin 81mg, Ondansetron ODT 4mg, Amlodipine 5mg, Atorvastatin 40mg, Baclofen 10mg, Bisoprolol 5mg, Buspirone 10mg, Famotidine 40mg, Fluoxetine 20mg, Olmesartan-Hydrochlorotheazide 40-12.5mg, Ozempic .25mg, Spironolactone 50mg, Varenicline 1mg  Please describe any probable cause for these symptoms. : I am wanting to discuss my medication regimen.  The current regimen makes me feel very sluggish daily.  Primary Reason for Visit (Submitted on 8/27/2024)  What is the primary reason for your visit?: Other    History of Present Illness  Patient 54 y/o femle with dm, htn and hld;  feeling more fatigued so stopped a lot of meds;     Only taking asa, bisoprolol and olmesartan hctz;  woul dliek have more zofran and keep pepcid, mR;   ok with statin but off for few days;  however having muscle spasms;    Review of Systems   Respiratory:  Negative for shortness of breath.    Cardiovascular:  Negative for chest pain and palpitations.   Gastrointestinal:  Negative for abdominal pain.   Musculoskeletal:  Positive for myalgias.       Patient Active Problem List   Diagnosis    Mitral valve insufficiency    Palpitations    Essential hypertension    Tobacco use    Anxiety    Mixed hyperlipidemia    B12 deficiency    Smoker unmotivated to quit    IUD (intrauterine device) in place    Heme positive stool    Rectal bleeding    Colon polyp    Family history of colon cancer    SOB (shortness of breath)    Ganglion cyst -right thumb    Hypersomnia with sleep  apnea    PVD (peripheral vascular disease) -left toes digital ischemia       Social History     Socioeconomic History    Marital status:      Spouse name: Roddy    Number of children: 3    Years of education: 12   Tobacco Use    Smoking status: Every Day     Current packs/day: 1.00     Average packs/day: 1 pack/day for 32.7 years (32.7 ttl pk-yrs)     Types: Cigarettes     Start date: 1992    Smokeless tobacco: Never    Tobacco comments:     I am currently trying to quit   Vaping Use    Vaping status: Never Used   Substance and Sexual Activity    Alcohol use: Yes     Comment: I only consume alcohol occasionally    Drug use: No    Sexual activity: Not Currently     Partners: Male     Birth control/protection: I.U.D.       Allergies   Allergen Reactions    Hydrocodone-Acetaminophen Itching    Metformin Nausea And Vomiting       Current Outpatient Medications on File Prior to Visit   Medication Sig Dispense Refill    aspirin 81 MG EC tablet Take 1 tablet by mouth Daily.      baclofen (LIORESAL) 10 MG tablet Take 1 tablet by mouth 3 (Three) Times a Day As Needed for Muscle Spasms. 90 tablet 0    famotidine (PEPCID) 40 MG tablet Take 1 tablet by mouth Every Night. 30 tablet 5    [DISCONTINUED] Alcohol Swabs (Alcohol Pads) 70 % pads Check once daily 100 each 3    [DISCONTINUED] atorvastatin (LIPITOR) 40 MG tablet TAKE ONE TABLET BY MOUTH DAILY 90 tablet 1    [DISCONTINUED] bisoprolol (ZEBeta) 5 MG tablet Take 1 tablet by mouth Daily. 90 tablet 3    [DISCONTINUED] olmesartan-hydrochlorothiazide (BENICAR HCT) 40-12.5 MG per tablet TAKE 1 TABLET BY MOUTH DAILY 90 tablet 1    varenicline (APO-Varenicline) 1 MG tablet Take 1 tablet by mouth 2 (Two) Times a Day. (Patient not taking: Reported on 8/29/2024) 60 tablet 3    [DISCONTINUED] amLODIPine (NORVASC) 5 MG tablet TAKE ONE TABLET BY MOUTH DAILY (Patient not taking: Reported on 8/29/2024) 30 tablet 5    [DISCONTINUED] busPIRone (BUSPAR) 10 MG tablet TAKE ONE TABLET BY  "MOUTH TWICE A DAY (Patient not taking: Reported on 8/29/2024) 180 tablet 3    [DISCONTINUED] FLUoxetine (PROzac) 20 MG capsule TAKE ONE CAPSULE BY MOUTH DAILY (Patient not taking: Reported on 8/29/2024) 90 capsule 3    [DISCONTINUED] Glucose Blood (Blood Glucose Test) strip Check once daily (Patient not taking: Reported on 8/29/2024) 100 each 3    [DISCONTINUED] glucose monitor monitoring kit Check once daily dx non-iddm (Patient not taking: Reported on 8/29/2024) 1 each 0    [DISCONTINUED] Lancets misc Check once daily dx non-iddm (Patient not taking: Reported on 8/29/2024) 100 each 3    [DISCONTINUED] ondansetron ODT (ZOFRAN-ODT) 4 MG disintegrating tablet Place 1 tablet on the tongue Every 8 (Eight) Hours As Needed for Nausea or Vomiting. (Patient not taking: Reported on 8/29/2024) 24 tablet 0    [DISCONTINUED] Ozempic, 0.25 or 0.5 MG/DOSE, 2 MG/3ML solution pen-injector DIAL AND INJECT UNDER THE SKIN 0.25 MG WEEKLY FOR 4 WEEKS THEN DIAL AND INJECT 0.5 MG WEEKLY THEREAFTER (Patient not taking: Reported on 8/29/2024) 3 mL 0    [DISCONTINUED] spironolactone (ALDACTONE) 50 MG tablet TAKE 1 TABLET BY MOUTH DAILY (Patient not taking: Reported on 8/29/2024) 30 tablet 3     No current facility-administered medications on file prior to visit.       Objective   Vitals:    08/29/24 1133 08/29/24 1148   BP: 134/88 120/78   Pulse: 76    SpO2: 98%    Weight: 78 kg (172 lb)    Height: 157.5 cm (62\")      Body mass index is 31.46 kg/m².    Physical Exam  Vitals and nursing note reviewed.   Constitutional:       Appearance: Normal appearance. She is not toxic-appearing or diaphoretic.   HENT:      Head: Normocephalic and atraumatic.   Musculoskeletal:      Cervical back: Neck supple.   Skin:     General: Skin is warm and dry.      Capillary Refill: Capillary refill takes less than 2 seconds.   Neurological:      Mental Status: She is alert.   Psychiatric:         Mood and Affect: Mood normal.         Behavior: Behavior normal. "         Assessment & Plan   Diagnoses and all orders for this visit:    1. Type 2 diabetes mellitus without complication, without long-term current use of insulin (Primary)  -     Microalbumin / Creatinine Urine Ratio - Urine, Clean Catch  -     Lipid Panel  -     Comprehensive Metabolic Panel  -     Hemoglobin A1c  -     TSH    2. Essential hypertension  -     Comprehensive Metabolic Panel  -     olmesartan-hydrochlorothiazide (BENICAR HCT) 40-25 MG per tablet; Take 1 tablet by mouth Daily.  Dispense: 90 tablet; Refill: 3    3. Mixed hyperlipidemia  -     Lipid Panel  -     Comprehensive Metabolic Panel  -     atorvastatin (LIPITOR) 40 MG tablet; Take 1 tablet by mouth Daily.  Dispense: 90 tablet; Refill: 1    4. Other fatigue  -     TSH  -     Vitamin B12  -     CBC (No Diff)    5. Nausea  -     ondansetron ODT (ZOFRAN-ODT) 4 MG disintegrating tablet; Place 1 tablet on the tongue Every 8 (Eight) Hours As Needed for Nausea or Vomiting.  Dispense: 24 tablet; Refill: 2    6. Pulmonary nodule  -     CT Chest Without Contrast; Future    In October due for CT chest, will have arranged  Encouraged to get C-scope soon as had precancerious polyps  Check labs fatigue;   Bp ok on less meds; w ill adjust olmesartan hctz from 40/12.5 to 40/25 and stop bisoprolol and stay off spironolactone and amlodipein as bp fine off of.  Hld - let know if myalgias worse on statin again and will change  0k refill ozfran, but stay off ozempic  Dm2 - recheck labs;  work on diet.                  -Follow up: 3 months and prn

## 2024-09-10 DIAGNOSIS — U07.1 COVID-19 VIRUS DETECTED: Primary | ICD-10-CM

## 2024-10-01 ENCOUNTER — TELEPHONE (OUTPATIENT)
Dept: GASTROENTEROLOGY | Facility: CLINIC | Age: 54
End: 2024-10-01
Payer: COMMERCIAL

## 2024-10-01 NOTE — TELEPHONE ENCOUNTER
Hub staff attempted to follow warm transfer process and was unsuccessful     Caller: Myriam Thomas    Relationship to patient: Self    Best call back number: [phone]    Patient is needing:   PT SCHEDULED FOR SCOPE WITH DR. THOMAS ON 10/17.  SHE NEEDS TO CANCEL AND RESCHEDULE.  PLEASE GIVE HER A CALL

## 2024-10-01 NOTE — TELEPHONE ENCOUNTER
Hub staff attempted to follow warm transfer process and was unsuccessful     Caller: Myriam Thomas    Relationship to patient: Self    Best call back number: [phone]    Patient is needing:   PT SCHEDULED FOR SCOPE WITH DR. THOMAS ON 10/17.  SHE NEEDS TO CANCEL AND RESCHEDULE.  PLEASE GIVE HER A CALL

## 2024-10-02 ENCOUNTER — TELEPHONE (OUTPATIENT)
Dept: GASTROENTEROLOGY | Facility: CLINIC | Age: 54
End: 2024-10-02
Payer: COMMERCIAL

## 2024-10-02 NOTE — TELEPHONE ENCOUNTER
FERN KIM IN SCHEDULING PT IN THE DEPOT,PT HAD TO CANCEL PROCEDURE DATE VERBALIZES UNDERSTANDING AGREEABLE TO WAITING FOR CALENDAR TO OPEN TO SCHEDULE PLACED PT ON CANCELLATION LIST ,OK FOR HUB TO RELAY

## 2024-10-14 ENCOUNTER — HOSPITAL ENCOUNTER (OUTPATIENT)
Dept: CT IMAGING | Facility: HOSPITAL | Age: 54
Discharge: HOME | End: 2024-10-14
Admitting: FAMILY MEDICINE
Payer: COMMERCIAL

## 2024-10-14 DIAGNOSIS — R91.1 PULMONARY NODULE: ICD-10-CM

## 2024-10-14 PROCEDURE — 71250 CT THORAX DX C-: CPT

## 2024-10-18 NOTE — PROGRESS NOTES
Call results to patient.  CT chest is stable and no cncerning nodules/masses.  Recommend repeat again in 12 months to monitor.

## 2024-10-26 ENCOUNTER — TELEPHONE (OUTPATIENT)
Dept: FAMILY MEDICINE CLINIC | Facility: CLINIC | Age: 54
End: 2024-10-26
Payer: COMMERCIAL

## 2024-10-26 NOTE — TELEPHONE ENCOUNTER
----- Message from Lizandro Barahona sent at 7/24/2024  8:07 AM EDT -----  Regarding: 3 month repeat CT chest  3 month repeat CT chest for new nodule at [facility]

## 2024-10-31 ENCOUNTER — OFFICE VISIT (OUTPATIENT)
Dept: CARDIOLOGY | Facility: CLINIC | Age: 54
End: 2024-10-31
Payer: COMMERCIAL

## 2024-10-31 VITALS
WEIGHT: 175 LBS | HEIGHT: 62 IN | DIASTOLIC BLOOD PRESSURE: 92 MMHG | HEART RATE: 101 BPM | BODY MASS INDEX: 32.2 KG/M2 | SYSTOLIC BLOOD PRESSURE: 143 MMHG

## 2024-10-31 DIAGNOSIS — I34.0 MITRAL VALVE INSUFFICIENCY, UNSPECIFIED ETIOLOGY: ICD-10-CM

## 2024-10-31 DIAGNOSIS — R00.2 PALPITATIONS: ICD-10-CM

## 2024-10-31 DIAGNOSIS — E78.2 MIXED HYPERLIPIDEMIA: ICD-10-CM

## 2024-10-31 DIAGNOSIS — I10 ESSENTIAL HYPERTENSION: Primary | ICD-10-CM

## 2024-10-31 PROCEDURE — 99213 OFFICE O/P EST LOW 20 MIN: CPT | Performed by: INTERNAL MEDICINE

## 2024-10-31 PROCEDURE — 93000 ELECTROCARDIOGRAM COMPLETE: CPT | Performed by: INTERNAL MEDICINE

## 2024-10-31 NOTE — PROGRESS NOTES
1 year follow up   Subjective:        Myriam Thomas is a 54 y.o. female who here for follow up    CC  : Follow-up mitral insufficiency palpitation hypertension  HPI  54-year-old female with mitral insufficiency palpitations and hypertension here for the follow-up denies any chest pains or tightness in the chest     Problems Addressed this Visit          Cardiac and Vasculature    Mitral valve insufficiency    Palpitations    Essential hypertension - Primary    Mixed hyperlipidemia     Diagnoses         Codes Comments    Essential hypertension    -  Primary ICD-10-CM: I10  ICD-9-CM: 401.9     Mitral valve insufficiency, unspecified etiology     ICD-10-CM: I34.0  ICD-9-CM: 424.0     Mixed hyperlipidemia     ICD-10-CM: E78.2  ICD-9-CM: 272.2     Palpitations     ICD-10-CM: R00.2  ICD-9-CM: 785.1           .    The following portions of the patient's history were reviewed and updated as appropriate: allergies, current medications, past family history, past medical history, past social history, past surgical history and problem list.    Past Medical History:   Diagnosis Date    Abnormal Pap smear of cervix 04/14/2021    ASCUS HPV (-)    Anxiety     Depression     HTN (hypertension)     Hyperlipidemia     Mitral regurgitation     Palpitations     Renal calculi      reports that she has been smoking cigarettes. She started smoking about 32 years ago. She has a 32.8 pack-year smoking history. She has never used smokeless tobacco. She reports current alcohol use. She reports that she does not use drugs.   Family History   Problem Relation Age of Onset    Heart disease Father         unsure full hx.    Heart attack Father     Hypertension Mother     Heart attack Mother     Diabetes Mother     Heart disease Mother     Hyperlipidemia Mother     Cancer Brother         between spinal cord and brain stem    No Known Problems Paternal Grandfather     Diabetes Paternal Grandmother     Breast cancer Maternal Grandmother         50s  "recurrence in her late 60s    Colon cancer Maternal Grandmother         70s y/o    Hypertension Maternal Grandmother     Alzheimer's disease Maternal Grandmother     Hyperlipidemia Maternal Grandmother     Prostate cancer Maternal Grandfather     Hypertension Maternal Grandfather     Emphysema Maternal Grandfather     Hyperlipidemia Maternal Grandfather     Ovarian cancer Neg Hx     Uterine cancer Neg Hx        Review of Systems  Constitutional: No wt loss, fever, fatigue  Gastrointestinal: No nausea, abdominal pain  Behavioral/Psych: No insomnia or anxiety   Cardiovascular no chest pains or tightness in the chest  Objective:       Physical Exam  /92   Pulse 101   Ht 157.5 cm (62.01\")   Wt 79.4 kg (175 lb)   BMI 32.00 kg/m²   General appearance: No acute changes   Neck: Trachea midline; NECK, supple, no thyromegaly or lymphadenopathy   Lungs: Normal size and shape, normal breath sounds, equal distribution of air, no rales and rhonchi   CV: S1-S2 regular, no murmurs, no rub, no gallop   Abdomen: Soft, nontender; no masses , no abnormal abdominal sounds   Extremities: No deformity , normal color , no peripheral edema   Skin: Normal temperature, turgor and texture; no rash, ulcers            ECG 12 Lead    Date/Time: 10/31/2024 1:25 PM  Performed by: Noreen Beauchamp MD    Authorized by: Noreen Beauchamp MD  Comparison: compared with previous ECG   Similar to previous ECG  Rhythm: sinus rhythm    Clinical impression: non-specific ECG            Echocardiogram:    Results for orders placed in visit on 11/19/20    Adult Transthoracic Echo Complete W/ Cont if Necessary Per Protocol    Interpretation Summary  · Estimated right ventricular systolic pressure from tricuspid regurgitation is normal (<35 mmHg).  · Calculated left ventricular EF = 66.4% Estimated left ventricular EF was in agreement with the calculated left ventricular EF.  · Left ventricular diastolic function was normal.  · Calculated " left ventricular EF = 66.4%  · There is no evidence of pericardial effusion. .          Current Outpatient Medications:     aspirin 81 MG EC tablet, Take 1 tablet by mouth Daily., Disp: , Rfl:     atorvastatin (LIPITOR) 40 MG tablet, Take 1 tablet by mouth Daily., Disp: 90 tablet, Rfl: 1    baclofen (LIORESAL) 10 MG tablet, Take 1 tablet by mouth 3 (Three) Times a Day As Needed for Muscle Spasms., Disp: 90 tablet, Rfl: 0    famotidine (PEPCID) 40 MG tablet, Take 1 tablet by mouth Every Night., Disp: 30 tablet, Rfl: 5    olmesartan-hydrochlorothiazide (BENICAR HCT) 40-25 MG per tablet, Take 1 tablet by mouth Daily., Disp: 90 tablet, Rfl: 3    ondansetron ODT (ZOFRAN-ODT) 4 MG disintegrating tablet, Place 1 tablet on the tongue Every 8 (Eight) Hours As Needed for Nausea or Vomiting., Disp: 24 tablet, Rfl: 2    varenicline (APO-Varenicline) 1 MG tablet, Take 1 tablet by mouth 2 (Two) Times a Day., Disp: 60 tablet, Rfl: 3   Assessment:                Plan:          ICD-10-CM ICD-9-CM   1. Essential hypertension  I10 401.9   2. Mitral valve insufficiency, unspecified etiology  I34.0 424.0   3. Mixed hyperlipidemia  E78.2 272.2   4. Palpitations  R00.2 785.1     1. Essential hypertension  Patient understands importance of blood pressure check at home which patient does regularly and the blood pressures are well under control to the level of less than 140/90      2. Mitral valve insufficiency, unspecified etiology  Controlled    3. Mixed hyperlipidemia  Continue current treatment    4. Palpitations  Under control      1 YR  COUNSELING:    Myriam Leyva was given to patient for the following topics: diagnostic results, risk factor reductions, impressions, risks and benefits of treatment options and importance of treatment compliance .       SMOKING COUNSELING:        Dictated using Dragon dictation

## 2024-11-08 LAB
ALBUMIN SERPL-MCNC: 4.2 G/DL (ref 3.5–5.2)
ALBUMIN/CREAT UR: 21 MG/G CREAT (ref 0–29)
ALBUMIN/GLOB SERPL: 1.9 G/DL
ALP SERPL-CCNC: 85 U/L (ref 39–117)
ALT SERPL-CCNC: 25 U/L (ref 1–33)
AST SERPL-CCNC: 23 U/L (ref 1–32)
BILIRUB SERPL-MCNC: 0.3 MG/DL (ref 0–1.2)
BUN SERPL-MCNC: 10 MG/DL (ref 6–20)
BUN/CREAT SERPL: 11.2 (ref 7–25)
CALCIUM SERPL-MCNC: 9.4 MG/DL (ref 8.6–10.5)
CHLORIDE SERPL-SCNC: 104 MMOL/L (ref 98–107)
CHOLEST SERPL-MCNC: 130 MG/DL (ref 0–200)
CO2 SERPL-SCNC: 26 MMOL/L (ref 22–29)
CREAT SERPL-MCNC: 0.89 MG/DL (ref 0.57–1)
CREAT UR-MCNC: 150 MG/DL
EGFRCR SERPLBLD CKD-EPI 2021: 78.1 ML/MIN/1.73
ERYTHROCYTE [DISTWIDTH] IN BLOOD BY AUTOMATED COUNT: 12.9 % (ref 12.3–15.4)
GLOBULIN SER CALC-MCNC: 2.2 GM/DL
GLUCOSE SERPL-MCNC: 120 MG/DL (ref 65–99)
HBA1C MFR BLD: 6.1 % (ref 4.8–5.6)
HCT VFR BLD AUTO: 39.9 % (ref 34–46.6)
HDLC SERPL-MCNC: 34 MG/DL (ref 40–60)
HGB BLD-MCNC: 13.7 G/DL (ref 12–15.9)
LDLC SERPL CALC-MCNC: 68 MG/DL (ref 0–100)
MCH RBC QN AUTO: 31.6 PG (ref 26.6–33)
MCHC RBC AUTO-ENTMCNC: 34.3 G/DL (ref 31.5–35.7)
MCV RBC AUTO: 91.9 FL (ref 79–97)
MICROALBUMIN UR-MCNC: 31.8 UG/ML
PLATELET # BLD AUTO: 369 10*3/MM3 (ref 140–450)
POTASSIUM SERPL-SCNC: 4.4 MMOL/L (ref 3.5–5.2)
PROT SERPL-MCNC: 6.4 G/DL (ref 6–8.5)
RBC # BLD AUTO: 4.34 10*6/MM3 (ref 3.77–5.28)
SODIUM SERPL-SCNC: 143 MMOL/L (ref 136–145)
TRIGL SERPL-MCNC: 160 MG/DL (ref 0–150)
TSH SERPL DL<=0.005 MIU/L-ACNC: 0.74 UIU/ML (ref 0.27–4.2)
VIT B12 SERPL-MCNC: 311 PG/ML (ref 211–946)
VLDLC SERPL CALC-MCNC: 28 MG/DL (ref 5–40)
WBC # BLD AUTO: 7.94 10*3/MM3 (ref 3.4–10.8)

## 2024-11-11 RX ORDER — VIT C/E/ZN/COPPR/LUTEIN/ZEAXAN 250MG-90MG
2500 CAPSULE ORAL DAILY
Qty: 90 EACH | Refills: 3 | Status: SHIPPED | OUTPATIENT
Start: 2024-11-11

## 2025-02-24 ENCOUNTER — OFFICE VISIT (OUTPATIENT)
Dept: OBSTETRICS AND GYNECOLOGY | Facility: CLINIC | Age: 55
End: 2025-02-24
Payer: COMMERCIAL

## 2025-02-24 ENCOUNTER — PROCEDURE VISIT (OUTPATIENT)
Dept: OBSTETRICS AND GYNECOLOGY | Facility: CLINIC | Age: 55
End: 2025-02-24
Payer: COMMERCIAL

## 2025-02-24 VITALS
DIASTOLIC BLOOD PRESSURE: 106 MMHG | BODY MASS INDEX: 30.05 KG/M2 | HEIGHT: 64 IN | SYSTOLIC BLOOD PRESSURE: 146 MMHG | WEIGHT: 176 LBS

## 2025-02-24 DIAGNOSIS — Z12.11 COLON CANCER SCREENING: ICD-10-CM

## 2025-02-24 DIAGNOSIS — Z30.432 ENCOUNTER FOR IUD REMOVAL: ICD-10-CM

## 2025-02-24 DIAGNOSIS — R87.610 ASCUS OF CERVIX WITH NEGATIVE HIGH RISK HPV: ICD-10-CM

## 2025-02-24 DIAGNOSIS — Z12.31 VISIT FOR SCREENING MAMMOGRAM: Primary | ICD-10-CM

## 2025-02-24 DIAGNOSIS — Z01.419 VISIT FOR GYNECOLOGIC EXAMINATION: Primary | ICD-10-CM

## 2025-02-24 LAB
DEVELOPER EXPIRATION DATE: NORMAL
DEVELOPER LOT NUMBER: NORMAL
EXPIRATION DATE: NORMAL
FECAL OCCULT BLOOD SCREEN, POC: NEGATIVE
Lab: NORMAL
NEGATIVE CONTROL: NEGATIVE
POSITIVE CONTROL: POSITIVE

## 2025-02-24 RX ORDER — OLMESARTAN MEDOXOMIL AND HYDROCHLOROTHIAZIDE 40/12.5 40; 12.5 MG/1; MG/1
TABLET ORAL
COMMUNITY
Start: 2025-02-04 | End: 2025-03-03

## 2025-02-24 NOTE — PROGRESS NOTES
"[facility]  [address] [facility], [address]  [place], [place] [address]  Phone: [phone] / Fax:  [phone]      2025    [address] [place], [place] [id number]    Lizandro Barahona DO    Chief Complaint   Patient presents with    Gynecologic Exam     Annual Exam,last pap 2-5-24 NL HPV (-), 2-2-23 NL HPV (-), -- ASCUS HPV (-). Mammogram today,previous 2-5-24- masses in both breast are benign-negative. Colonoscopy 21 polyps, repeat needed.PCP has placed a referral, patient is waiting to here back. She is having trouble getting this scheduled.  Mirena IUD in place since 18.       Myriam Thomas is here for annual gynecologic exam.  HPI - Patient with ASCUS pap HPV negative in  with 2 consecutive normal pap tests.  She had a benign mammogram one year ago and underwent screening again today.  She had a colonoscopy with \"pre-cancerous\" polyps in ; it was recommended that she have follow up in one year but has had difficulties scheduling this.  She is planning to do this in person today.  She has had an IUD in place for 6-7 years and has no cycles; she is not sexually active.    Past Medical History:   Diagnosis Date    Abnormal Pap smear of cervix 2021    ASCUS HPV (-)    Anxiety     Chronic kidney disease     KIDENY STONES    Depression     Diabetes mellitus     HTN (hypertension)     Hyperlipidemia     Mitral regurgitation     Palpitations        Past Surgical History:   Procedure Laterality Date    BREAST BIOPSY Right      SECTION      COLONOSCOPY N/A 2021    Procedure: COLONOSCOPY to cecum and TI:  biopsies, cold biopsy polyps, hot snare polyp;  Surgeon: Theodore Rosen MD;  Location: [facility] ENDOSCOPY;  Service: Gastroenterology;  Laterality: N/A;  pre:   Rectal bleeding with mucous,  Heme positive stool.   post:  polyps,       ENDOSCOPY N/A 2021    Procedure: ESOPHAGOGASTRODUODENOSCOPY with biopsies;  Surgeon: Theodore Rosen MD;  Location: [facility]" [facility] END[facility];  Service: Gastroenterology;  Laterality: N/A;  pre:   heartburn  post:  gastritis, HH,      KIDNEY STONE SURGERY      WISDOM TOOTH EXTRACTION         Allergies   Allergen Reactions    Hydrocodone-Acetaminophen Itching       Social History     Socioeconomic History    Marital status:      Spouse name: Roddy    Number of children: 3    Years of education: 12   Tobacco Use    Smoking status: Every Day     Current packs/day: 1.00     Average packs/day: 1 pack/day for 33.1 years (33.1 ttl pk-yrs)     Types: Cigarettes     Start date:     Smokeless tobacco: Never    Tobacco comments:     I am currently trying to quit   Vaping Use    Vaping status: Never Used   Substance and Sexual Activity    Alcohol use: Yes     Comment: I only consume alcohol occasionally    Drug use: No    Sexual activity: Not Currently     Partners: Male     Birth control/protection: I.U.D.       Family History   Problem Relation Age of Onset    Heart disease Father         unsure full hx.    Heart attack Father     Hypertension Mother     Heart attack Mother     Diabetes Mother     Heart disease Mother     Hyperlipidemia Mother     Cancer Brother         between spinal cord and brain stem    No Known Problems Brother     No Known Problems Paternal Grandfather     Diabetes Paternal Grandmother     Breast cancer Maternal Grandmother         50s recurrence in her late 60s    Colon cancer Maternal Grandmother         70s y/o    Hypertension Maternal Grandmother     Alzheimer's disease Maternal Grandmother     Hyperlipidemia Maternal Grandmother     Prostate cancer Maternal Grandfather     Hypertension Maternal Grandfather     Emphysema Maternal Grandfather     Hyperlipidemia Maternal Grandfather     Ovarian cancer Neg Hx     Uterine cancer Neg Hx        No LMP recorded. Patient has had an implant.    OB History          3    Para   3    Term   2       1    AB        Living   3         SAB        IAB        Ectopic  "       Molar        Multiple        Live Births   3                Vitals:    02/24/25 0933   BP: (!) 146/106   Weight: 79.8 kg (176 lb)   Height: 162.6 cm (64\")       Physical Exam  Constitutional:       Appearance: Normal appearance. She is well-developed.   Genitourinary:      Bladder, rectum and urethral meatus normal.      Right Labia: No tenderness or lesions.     Left Labia: No tenderness or lesions.     No vaginal discharge or tenderness.        Right Adnexa: not tender and not full.     Left Adnexa: not tender and not full.     No cervical motion tenderness or lesion.      Uterus is not enlarged or tender.      No urethral tenderness or hypermobility present.   Rectum:      No rectal mass or tenderness.   Breasts:     Right: No mass, nipple discharge or skin change.      Left: No mass, nipple discharge or skin change.   HENT:      Right Ear: External ear normal.      Left Ear: External ear normal.      Nose: Nose normal.   Eyes:      Conjunctiva/sclera: Conjunctivae normal.   Neck:      Thyroid: No thyromegaly.   Cardiovascular:      Rate and Rhythm: Normal rate and regular rhythm.      Heart sounds: Normal heart sounds.   Pulmonary:      Effort: Pulmonary effort is normal.      Breath sounds: No stridor. No wheezing.   Abdominal:      Palpations: Abdomen is soft.      Tenderness: There is no abdominal tenderness. There is no guarding or rebound.   Musculoskeletal:         General: Normal range of motion.      Cervical back: Normal range of motion and neck supple.   Neurological:      Mental Status: She is alert.      Coordination: Coordination normal.   Skin:     General: Skin is warm and dry.   Psychiatric:         Mood and Affect: Mood normal.         Behavior: Behavior normal.         Thought Content: Thought content normal.         Judgment: Judgment normal.   Vitals reviewed. Exam conducted with a chaperone present.     IUD Removal Procedure Note    Type of IUD:  Mirena  Date of insertion:  November " 21, 2018  Reason for removal:  Device expiration  Other relevant history/information:  none    Procedure Time Out Documentation      Procedure Details  IUD strings visible:  yes  Local anesthesia:  None  Tenaculum used:  None  Removal:  IUD strings grasped and IUD removed intact with gentle traction.  The patient tolerated the procedure well.    All appropriate instructions regarding removal were reviewed.    Patient tolerated the procedure well without complications.      Diagnoses and all orders for this visit:    1. Visit for gynecologic examination (Primary)  -     IgP, Aptima HPV  -     Discussed importance of regular screening and breast awareness.  -     Encouraged to continue quit smoking.    2. Colon cancer screening        -     FOBT negative.  Patient is going to attempt to work on scheduling colonoscopy in person today.    3. ASCUS of cervix with negative high risk HPV        -     If pap normal, patient can resume normal interval screening.    4. IUD removal        -     IUD removed without difficulty.  No further need for contraception due to age.      Clifton Redman MD

## 2025-02-25 ENCOUNTER — TELEPHONE (OUTPATIENT)
Dept: GASTROENTEROLOGY | Facility: CLINIC | Age: 55
End: 2025-02-25
Payer: COMMERCIAL

## 2025-02-25 DIAGNOSIS — Z80.0 FAMILY HISTORY OF COLON CANCER: Primary | ICD-10-CM

## 2025-02-25 RX ORDER — SODIUM CHLORIDE, SODIUM LACTATE, POTASSIUM CHLORIDE, CALCIUM CHLORIDE 600; 310; 30; 20 MG/100ML; MG/100ML; MG/100ML; MG/100ML
30 INJECTION, SOLUTION INTRAVENOUS CONTINUOUS
OUTPATIENT
Start: 2025-02-25 | End: 2025-02-25

## 2025-02-25 NOTE — TELEPHONE ENCOUNTER
FERN Dejesus for COLONOSCOPY on 5/29/25  arrive at 10:30  . Sent prep instructions to pt mail address ....[place]

## 2025-02-27 ENCOUNTER — TELEPHONE (OUTPATIENT)
Dept: OBSTETRICS AND GYNECOLOGY | Facility: CLINIC | Age: 55
End: 2025-02-27
Payer: COMMERCIAL

## 2025-02-27 LAB
CYTOLOGIST CVX/VAG CYTO: NORMAL
CYTOLOGY CVX/VAG DOC CYTO: NORMAL
CYTOLOGY CVX/VAG DOC THIN PREP: NORMAL
DX ICD CODE: NORMAL
HPV I/H RISK 4 DNA CVX QL PROBE+SIG AMP: NEGATIVE
OTHER STN SPEC: NORMAL
SERVICE CMNT-IMP: NORMAL
STAT OF ADQ CVX/VAG CYTO-IMP: NORMAL

## 2025-02-27 NOTE — TELEPHONE ENCOUNTER
I spoke to the patient she is aware of a Normal pap this year and can resume having her pap's every 3 years. 2-27-25/lw    ----- Message from Clifton Redman sent at 2/27/2025 12:58 PM EST -----  LAW - pap was normal.  Patient can resume screening pap every 3 years.

## 2025-03-03 ENCOUNTER — OFFICE VISIT (OUTPATIENT)
Dept: FAMILY MEDICINE CLINIC | Facility: CLINIC | Age: 55
End: 2025-03-03
Payer: COMMERCIAL

## 2025-03-03 VITALS
SYSTOLIC BLOOD PRESSURE: 138 MMHG | HEIGHT: 64 IN | BODY MASS INDEX: 30.9 KG/M2 | OXYGEN SATURATION: 96 % | HEART RATE: 101 BPM | WEIGHT: 181 LBS | DIASTOLIC BLOOD PRESSURE: 90 MMHG

## 2025-03-03 DIAGNOSIS — B07.9 VERRUCA: ICD-10-CM

## 2025-03-03 DIAGNOSIS — I10 ESSENTIAL HYPERTENSION: Primary | ICD-10-CM

## 2025-03-03 PROCEDURE — 99214 OFFICE O/P EST MOD 30 MIN: CPT | Performed by: FAMILY MEDICINE

## 2025-03-03 PROCEDURE — 17110 DESTRUCTION B9 LES UP TO 14: CPT | Performed by: FAMILY MEDICINE

## 2025-03-03 RX ORDER — OLMESARTAN MEDOXOMIL AND HYDROCHLOROTHIAZIDE 40/25 40; 25 MG/1; MG/1
1 TABLET ORAL DAILY
Qty: 90 TABLET | Refills: 3 | Status: SHIPPED | OUTPATIENT
Start: 2025-03-03

## 2025-03-03 NOTE — PROGRESS NOTES
Subjective   Myriam Thomas is a 54 y.o. female. Presents today for   Chief Complaint   Patient presents with    Hypertension           History of Present Illness  Patient 53 y/o female with dm2, hld and htn here for f/u of bp;  Was on several bp meds and wanted off and not taking some last ov, so increased olmesartan hctz from 40/12.5 to 40/25;  She was off spironolactone and amlodipine already and had stop bisoprolol.  She reports that she accidnetly ordered active refill on olmesartan hct lower dose so bp not quite goal and on wrong dose.       Right thumb and index verruca not going away with OTC tx.      Review of Systems   Constitutional:  Positive for fatigue. Negative for chills, diaphoresis and fever.   HENT:  Negative for congestion and sore throat.    Cardiovascular:  Negative for chest pain.   Gastrointestinal:  Negative for abdominal pain, nausea and vomiting.   Genitourinary:  Negative for dysuria.   Musculoskeletal:  Negative for neck pain.   Skin:  Negative for rash.   Neurological:  Positive for headaches. Negative for weakness.       Patient Active Problem List   Diagnosis    Mitral valve insufficiency    Palpitations    Essential hypertension    Tobacco use    Anxiety    Mixed hyperlipidemia    B12 deficiency    Smoker unmotivated to quit    IUD (intrauterine device) in place    Heme positive stool    Rectal bleeding    Colon polyp    Family history of colon cancer    SOB (shortness of breath)    Ganglion cyst -right thumb    Hypersomnia with sleep apnea    PVD (peripheral vascular disease) -left toes digital ischemia       Social History     Socioeconomic History    Marital status:      Spouse name: Roddy    Number of children: 3    Years of education: 12   Tobacco Use    Smoking status: Every Day     Current packs/day: 1.00     Average packs/day: 1 pack/day for 33.2 years (33.2 ttl pk-yrs)     Types: Cigarettes     Start date: 1992    Smokeless tobacco: Never    Tobacco comments:     I am  "currently trying to quit   Vaping Use    Vaping status: Never Used   Substance and Sexual Activity    Alcohol use: Yes     Comment: I only consume alcohol occasionally    Drug use: No    Sexual activity: Not Currently     Partners: Male     Birth control/protection: I.U.D.       Allergies   Allergen Reactions    Hydrocodone-Acetaminophen Itching         Objective   Vitals:    03/03/25 1601   BP: 138/90   Pulse: 101   SpO2: 96%   Weight: 82.1 kg (181 lb)   Height: 162.6 cm (64\")     Body mass index is 31.07 kg/m².    Physical Exam  Vitals and nursing note reviewed.   Constitutional:       Appearance: Normal appearance. She is not toxic-appearing or diaphoretic.   HENT:      Head: Normocephalic and atraumatic.   Musculoskeletal:      Cervical back: Neck supple.   Skin:     General: Skin is warm and dry.      Capillary Refill: Capillary refill takes less than 2 seconds.      Comments: Tip of right thumb 5 mm verruca;  tip right index 3 mm verruca;   Neurological:      Mental Status: She is alert.   Psychiatric:         Mood and Affect: Mood normal.         Behavior: Behavior normal.     Cryotherapy, Skin Lesion x2 lesions right thumb and index finger    Date/Time: 3/3/2025 4:35 PM    Performed by: Lizandro Barahona DO  Authorized by: Lizandro Barahona DO  Consent: Verbal consent obtained.  Risks and benefits: risks, benefits and alternatives were discussed  Consent given by: patient  Patient understanding: patient states understanding of the procedure being performed  Site marked: the operative site was marked  Required items: required blood products, implants, devices, and special equipment available  Patient identity confirmed: verbally with patient  Local anesthesia used: no    Anesthesia:  Local anesthesia used: no    Sedation:  Patient sedated: no    Patient tolerance: Patient tolerated the procedure well with no immediate complications            Results  Component      Latest Ref Rng 11/5/2024   Glucose      " 65 - 99 mg/dL 120 (H)    BUN      6 - 20 mg/dL 10    Creatinine      0.57 - 1.00 mg/dL 0.89    EGFR Result      >60.0 mL/min/1.73 78.1    BUN/Creatinine Ratio      7.0 - 25.0  11.2    Sodium      136 - 145 mmol/L 143    Potassium      3.5 - 5.2 mmol/L 4.4    Chloride      98 - 107 mmol/L 104    CO2      22.0 - 29.0 mmol/L 26.0    Calcium      8.6 - 10.5 mg/dL 9.4    Total Protein      6.0 - 8.5 g/dL 6.4    Albumin      3.5 - 5.2 g/dL 4.2    Globulin      gm/dL 2.2    A/G Ratio      g/dL 1.9    Total Bilirubin      0.0 - 1.2 mg/dL 0.3    Alkaline Phosphatase      39 - 117 U/L 85    AST (SGOT)      1 - 32 U/L 23    ALT (SGPT)      1 - 33 U/L 25    WBC      3.40 - 10.80 10*3/mm3 7.94    RBC      3.77 - 5.28 10*6/mm3 4.34    Hemoglobin      12.0 - 15.9 g/dL 13.7    Hematocrit      34.0 - 46.6 % 39.9    MCV      79.0 - 97.0 fL 91.9    MCH      26.6 - 33.0 pg 31.6    MCHC      31.5 - 35.7 g/dL 34.3    RDW      12.3 - 15.4 % 12.9    Platelets      140 - 450 10*3/mm3 369    Total Cholesterol      0 - 200 mg/dL 130    Triglycerides      0 - 150 mg/dL 160 (H)    HDL Cholesterol      40 - 60 mg/dL 34 (L)    VLDL Cholesterol Rahat      5 - 40 mg/dL 28    LDL Cholesterol       0 - 100 mg/dL 68    Creatinine, Urine      Not Estab. mg/dL 150.0    Microalbumin, Urine      Not Estab. ug/mL 31.8    Microalbumin/Creatinine Ratio      0 - 29 mg/g creat 21    Hemoglobin A1C      4.80 - 5.60 % 6.10 (H)    TSH Baseline      0.270 - 4.200 uIU/mL 0.744    Vitamin B-12      211 - 946 pg/mL 311       Legend:  (H) High  (L) Low     Assessment & Plan   Diagnoses and all orders for this visit:    1. Essential hypertension (Primary)  -     olmesartan-hydrochlorothiazide (Benicar HCT) 40-25 MG per tablet; Take 1 tablet by mouth Daily.  Dispense: 90 tablet; Refill: 3    2. Verruca  -     Cryotherapy, Skin Lesion      [facility] only labs - next time will order cmp, lipid, A1c and mal/cr  Increase olmesartan hctz to 40/25mg keep bp log  Discussed may need  additional treatments to verruca     Assessment & Plan      BMI is >= 30 and <35. (Class 1 Obesity). The following options were offered after discussion;: weight loss educational material (shared in after visit summary)     -Follow up: 3 months and prn     ________________________________________  Lizandro Barahona DO, MS    Current Outpatient Medications on File Prior to Visit   Medication Sig Dispense Refill    aspirin 81 MG EC tablet Take 1 tablet by mouth Daily.      atorvastatin (LIPITOR) 40 MG tablet Take 1 tablet by mouth Daily. 90 tablet 1    baclofen (LIORESAL) 10 MG tablet Take 1 tablet by mouth 3 (Three) Times a Day As Needed for Muscle Spasms. 90 tablet 0    Cyanocobalamin (FT Vitamin B-12) 2500 MCG sublingual tablet Place 2,500 mcg under the tongue Daily. 90 each 3    famotidine (PEPCID) 40 MG tablet Take 1 tablet by mouth Every Night. 30 tablet 5    ondansetron ODT (ZOFRAN-ODT) 4 MG disintegrating tablet Place 1 tablet on the tongue Every 8 (Eight) Hours As Needed for Nausea or Vomiting. 24 tablet 2    varenicline (APO-Varenicline) 1 MG tablet Take 1 tablet by mouth 2 (Two) Times a Day. 60 tablet 3    [DISCONTINUED] olmesartan-hydrochlorothiazide (BENICAR HCT) 40-12.5 MG per tablet        No current facility-administered medications on file prior to visit.

## 2025-03-04 DIAGNOSIS — R10.13 EPIGASTRIC ABDOMINAL PAIN: ICD-10-CM

## 2025-03-04 RX ORDER — FAMOTIDINE 40 MG/1
40 TABLET, FILM COATED ORAL NIGHTLY
Qty: 30 TABLET | Refills: 5 | Status: SHIPPED | OUTPATIENT
Start: 2025-03-04

## 2025-03-08 DIAGNOSIS — R11.0 NAUSEA: ICD-10-CM

## 2025-03-09 RX ORDER — ONDANSETRON 4 MG/1
TABLET, ORALLY DISINTEGRATING ORAL
Qty: 24 TABLET | Refills: 2 | Status: SHIPPED | OUTPATIENT
Start: 2025-03-09

## 2025-05-08 ENCOUNTER — TELEPHONE (OUTPATIENT)
Dept: GASTROENTEROLOGY | Facility: CLINIC | Age: 55
End: 2025-05-08
Payer: COMMERCIAL

## 2025-05-08 NOTE — TELEPHONE ENCOUNTER
Caller: Myriam Thomas    Relationship: Self    Best call back number:    [phone]         What was the call regarding: PATIENT TRYING TO VERIFY TIME OF COLONOSCOPY. PLEASE CONTACT AND ADVISE.

## 2025-05-29 ENCOUNTER — ANESTHESIA (OUTPATIENT)
Dept: GASTROENTEROLOGY | Facility: HOSPITAL | Age: 55
End: 2025-05-29
Payer: COMMERCIAL

## 2025-05-29 ENCOUNTER — HOSPITAL ENCOUNTER (OUTPATIENT)
Facility: HOSPITAL | Age: 55
Setting detail: HOSPITAL OUTPATIENT SURGERY
Discharge: HOME | End: 2025-05-29
Attending: INTERNAL MEDICINE | Admitting: INTERNAL MEDICINE
Payer: COMMERCIAL

## 2025-05-29 ENCOUNTER — ANESTHESIA EVENT (OUTPATIENT)
Dept: GASTROENTEROLOGY | Facility: HOSPITAL | Age: 55
End: 2025-05-29
Payer: COMMERCIAL

## 2025-05-29 VITALS
SYSTOLIC BLOOD PRESSURE: 100 MMHG | BODY MASS INDEX: 30.05 KG/M2 | HEIGHT: 64 IN | OXYGEN SATURATION: 98 % | WEIGHT: 176 LBS | HEART RATE: 81 BPM | DIASTOLIC BLOOD PRESSURE: 85 MMHG | RESPIRATION RATE: 19 BRPM

## 2025-05-29 DIAGNOSIS — Z80.0 FAMILY HISTORY OF COLON CANCER: ICD-10-CM

## 2025-05-29 LAB — GLUCOSE BLDC GLUCOMTR-MCNC: 131 MG/DL (ref 70–130)

## 2025-05-29 PROCEDURE — 25810000003 LACTATED RINGERS PER 1000 ML: Performed by: INTERNAL MEDICINE

## 2025-05-29 PROCEDURE — 45380 COLONOSCOPY AND BIOPSY: CPT | Performed by: INTERNAL MEDICINE

## 2025-05-29 PROCEDURE — 45381 COLONOSCOPY SUBMUCOUS NJX: CPT | Performed by: INTERNAL MEDICINE

## 2025-05-29 PROCEDURE — 82948 REAGENT STRIP/BLOOD GLUCOSE: CPT

## 2025-05-29 PROCEDURE — 25010000002 PROPOFOL 1000 MG/100ML EMULSION: Performed by: ANESTHESIOLOGY

## 2025-05-29 PROCEDURE — 88305 TISSUE EXAM BY PATHOLOGIST: CPT | Performed by: INTERNAL MEDICINE

## 2025-05-29 PROCEDURE — 25010000002 PHENYLEPHRINE 10 MG/ML SOLUTION: Performed by: ANESTHESIOLOGY

## 2025-05-29 PROCEDURE — 25010000002 PROPOFOL 200 MG/20ML EMULSION: Performed by: ANESTHESIOLOGY

## 2025-05-29 PROCEDURE — 45385 COLONOSCOPY W/LESION REMOVAL: CPT | Performed by: INTERNAL MEDICINE

## 2025-05-29 PROCEDURE — 25010000002 LIDOCAINE 2% SOLUTION: Performed by: ANESTHESIOLOGY

## 2025-05-29 DEVICE — REPLAY HEMOSTASIS CLIP, 16MM SPAN
Type: IMPLANTABLE DEVICE | Site: COLON | Status: FUNCTIONAL
Brand: REPLAY

## 2025-05-29 RX ORDER — SODIUM CHLORIDE, SODIUM LACTATE, POTASSIUM CHLORIDE, CALCIUM CHLORIDE 600; 310; 30; 20 MG/100ML; MG/100ML; MG/100ML; MG/100ML
30 INJECTION, SOLUTION INTRAVENOUS CONTINUOUS
Status: DISCONTINUED | OUTPATIENT
Start: 2025-05-29 | End: 2025-05-29 | Stop reason: HOSPADM

## 2025-05-29 RX ORDER — SODIUM CHLORIDE 0.9 % (FLUSH) 0.9 %
10 SYRINGE (ML) INJECTION AS NEEDED
Status: DISCONTINUED | OUTPATIENT
Start: 2025-05-29 | End: 2025-05-29 | Stop reason: HOSPADM

## 2025-05-29 RX ORDER — ONDANSETRON 2 MG/ML
4 INJECTION INTRAMUSCULAR; INTRAVENOUS ONCE AS NEEDED
Status: DISCONTINUED | OUTPATIENT
Start: 2025-05-29 | End: 2025-05-29 | Stop reason: HOSPADM

## 2025-05-29 RX ORDER — PHENYLEPHRINE HYDROCHLORIDE 10 MG/ML
INJECTION INTRAVENOUS AS NEEDED
Status: DISCONTINUED | OUTPATIENT
Start: 2025-05-29 | End: 2025-05-29 | Stop reason: SURG

## 2025-05-29 RX ORDER — PROPOFOL 10 MG/ML
INJECTION, EMULSION INTRAVENOUS CONTINUOUS PRN
Status: DISCONTINUED | OUTPATIENT
Start: 2025-05-29 | End: 2025-05-29 | Stop reason: SURG

## 2025-05-29 RX ORDER — SODIUM CHLORIDE, SODIUM LACTATE, POTASSIUM CHLORIDE, CALCIUM CHLORIDE 600; 310; 30; 20 MG/100ML; MG/100ML; MG/100ML; MG/100ML
1000 INJECTION, SOLUTION INTRAVENOUS CONTINUOUS
Status: DISCONTINUED | OUTPATIENT
Start: 2025-05-29 | End: 2025-05-29 | Stop reason: HOSPADM

## 2025-05-29 RX ORDER — PROPOFOL 10 MG/ML
INJECTION, EMULSION INTRAVENOUS AS NEEDED
Status: DISCONTINUED | OUTPATIENT
Start: 2025-05-29 | End: 2025-05-29 | Stop reason: SURG

## 2025-05-29 RX ORDER — LIDOCAINE HYDROCHLORIDE 20 MG/ML
INJECTION, SOLUTION INFILTRATION; PERINEURAL AS NEEDED
Status: DISCONTINUED | OUTPATIENT
Start: 2025-05-29 | End: 2025-05-29 | Stop reason: SURG

## 2025-05-29 RX ADMIN — LIDOCAINE HYDROCHLORIDE 60 MG: 20 INJECTION, SOLUTION INFILTRATION; PERINEURAL at 11:04

## 2025-05-29 RX ADMIN — SODIUM CHLORIDE, POTASSIUM CHLORIDE, SODIUM LACTATE AND CALCIUM CHLORIDE 30 ML/HR: 600; 310; 30; 20 INJECTION, SOLUTION INTRAVENOUS at 10:46

## 2025-05-29 RX ADMIN — PROPOFOL 200 MCG/KG/MIN: 10 INJECTION, EMULSION INTRAVENOUS at 11:04

## 2025-05-29 RX ADMIN — PROPOFOL INJECTABLE EMULSION 100 MG: 10 INJECTION, EMULSION INTRAVENOUS at 11:04

## 2025-05-29 RX ADMIN — PHENYLEPHRINE HYDROCHLORIDE 100 MCG: 10 INJECTION INTRAVENOUS at 11:27

## 2025-05-29 NOTE — DISCHARGE INSTRUCTIONS
For the next 24 hours patient needs to be with a responsible adult.    For THE REST OF TODAY DO NOT drive, operate machinery, appliances, drink alcohol, make important decisions or sign legal documents.    Start with a light or bland diet if you are feeling sick to your stomach otherwise advance to regular diet as tolerated.    Follow recommendations on procedure report if provided by your doctor.    Call Dr Onofre for problems [phone]    Problems may include but not limited to: large amounts of bleeding, trouble breathing, repeated vomiting, severe unrelieved pain, fever or chills.      If biopsies or polyps were taken, MD will call you with the results in about 7 days. If you don't hear from the MD in 2 weeks, call the number above.For the next 24 hours patient needs to be with a responsible adult.    For THE REST OF TODAY DO NOT drive, operate machinery, appliances, drink alcohol, make important decisions or sign legal documents.    Start with a light or bland diet if you are feeling sick to your stomach otherwise advance to regular diet as tolerated.    Follow recommendations on procedure report if provided by your doctor.    Call Dr Onofre for problems [phone]     Problems may include but not limited to: large amounts of bleeding, trouble breathing, repeated vomiting, severe unrelieved pain, fever or chills.      If biopsies or polyps were taken, MD will call you with the results in about 7 days. If you don't hear from the MD in 2 weeks, call the number above.

## 2025-05-29 NOTE — H&P
[facility]  Pre Procedure History & Physical    Chief Complaint:   Time for my colonoscopy    Subjective     HPI:   54 y.o. female presenting to endoscopy unit today for surveillance colonoscopy.    Past Medical History:   Past Medical History:   Diagnosis Date    Abnormal Pap smear of cervix 04/14/2021    ASCUS HPV (-)    Anxiety     Depression     Diabetes mellitus     GERD (gastroesophageal reflux disease)     HTN (hypertension)     Hyperlipidemia     Mitral regurgitation     Palpitations        Family History:  Family History   Problem Relation Age of Onset    Hypertension Mother     Heart attack Mother     Diabetes Mother     Heart disease Mother     Hyperlipidemia Mother     Heart disease Father         unsure full hx.    Heart attack Father     Cancer Brother         between spinal cord and brain stem    No Known Problems Brother     Breast cancer Maternal Grandmother         50s recurrence in her late 60s    Colon cancer Maternal Grandmother         70s y/o    Hypertension Maternal Grandmother     Alzheimer's disease Maternal Grandmother     Hyperlipidemia Maternal Grandmother     Prostate cancer Maternal Grandfather     Hypertension Maternal Grandfather     Emphysema Maternal Grandfather     Hyperlipidemia Maternal Grandfather     Diabetes Paternal Grandmother     No Known Problems Paternal Grandfather     Ovarian cancer Neg Hx     Uterine cancer Neg Hx     Malig Hyperthermia Neg Hx        Social History:   reports that she has been smoking cigarettes. She started smoking about 33 years ago. She has a 33.4 pack-year smoking history. She has never used smokeless tobacco. She reports current alcohol use. She reports that she does not use drugs.    Medications:   Medications Prior to Admission   Medication Sig Dispense Refill Last Dose/Taking    aspirin 81 MG EC tablet Take 1 tablet by mouth Daily.   Taking    atorvastatin (LIPITOR) 40 MG tablet Take 1 tablet by mouth Daily. 90 tablet  1 Taking    baclofen (LIORESAL) 10 MG tablet Take 1 tablet by mouth 3 (Three) Times a Day As Needed for Muscle Spasms. 90 tablet 0 Taking As Needed    Cyanocobalamin (FT Vitamin B-12) 2500 MCG sublingual tablet Place 2,500 mcg under the tongue Daily. 90 each 3 Taking    famotidine (PEPCID) 40 MG tablet TAKE ONE TABLET BY MOUTH ONCE NIGHTLY (Patient taking differently: Take 1 tablet by mouth Daily As Needed.) 30 tablet 5 Taking Differently    olmesartan-hydrochlorothiazide (Benicar HCT) 40-25 MG per tablet Take 1 tablet by mouth Daily. 90 tablet 3 Taking    ondansetron ODT (ZOFRAN-ODT) 4 MG disintegrating tablet PLACE 1 TABLET BY MOUTH EVERY 8 HOURS AS NEEDED FOR NAUSEA AND/OR VOMITING (Patient taking differently: Place 1 tablet on the tongue Every 8 (Eight) Hours As Needed.) 24 tablet 2 Taking Differently    varenicline (APO-Varenicline) 1 MG tablet Take 1 tablet by mouth 2 (Two) Times a Day. (Patient taking differently: Take 1 tablet by mouth Every Night.) 60 tablet 3 Taking Differently       Allergies:  Hydrocodone-acetaminophen    Objective     There were no vitals taken for this visit.  Physical Exam:   General: patient awake, alert and cooperative    Assessment & Plan     Diagnosis:  Personal hx of colon polyps    Anticipated Surgical Procedure:  Colonoscopy    The risks, benefits, and alternatives of this procedure have been discussed with the patient or the responsible party- the patient understands and agrees to proceed.

## 2025-05-29 NOTE — ANESTHESIA PREPROCEDURE EVALUATION
Anesthesia Evaluation     Patient summary reviewed                Airway   Mallampati: II  Dental    (+) edentulous    Pulmonary - normal exam   (+) a smoker Current, Smoked day of surgery, cigarettes,shortness of breath  Cardiovascular - normal exam    ECG reviewed    (+) hypertension, valvular problems/murmurs MR, hyperlipidemia    ROS comment: Reviewed stress test and echo reports:    · Findings consistent with a normal ECG stress test.  · Left ventricular ejection fraction is normal. (Calculated EF = 60%).  · Myocardial perfusion imaging indicates a normal myocardial perfusion study with no evidence of ischemia.  · Impressions are consistent with a low risk study.      Neuro/Psych  (+) psychiatric history Anxiety and Depression  GI/Hepatic/Renal/Endo    (+) obesity, GERD, diabetes mellitus type 2    Musculoskeletal     Abdominal    Substance History      OB/GYN          Other                      Anesthesia Plan    ASA 2     MAC     (I have reviewed all pertinent information including medical history,allergies, imaging, studies and laboratory results. I have explained alternatives, risks and benefits to anesthesia, including but not limited to; dental damage, sore throat, nausea, vomiting, aspiration, MI,stroke and death. Questions answered. Patient has agreed to proceed. )    Anesthetic plan, risks, benefits, and alternatives have been provided, discussed and informed consent has been obtained with: patient.      CODE STATUS:

## 2025-05-29 NOTE — ANESTHESIA POSTPROCEDURE EVALUATION
"Patient: Myriam Thomas    Procedure Summary       Date: 05/29/25 Room / Location: [facility] ENDOSCOPY 10 / [facility] ENDOSCOPY    Anesthesia Start: 1057 Anesthesia Stop: 1139    Procedure: COLONOSCOPY TO CECUM WITH COLD/HOT SNARE POLYPECTOMIES, BIOPSY POLYPECTOMIES, TATTOO MARKER, AND CLIP PLACEMENT X2 Diagnosis:       Family history of colon cancer      (Family history of colon cancer [Z80.0])    Surgeons: Raul Onofre MD Provider: Mishel King MD    Anesthesia Type: MAC ASA Status: 2            Anesthesia Type: MAC    Vitals  Vitals Value Taken Time   /85 05/29/25 12:01   Temp     Pulse 77 05/29/25 12:02   Resp 19 05/29/25 12:01   SpO2 98 % 05/29/25 12:02   Vitals shown include unfiled device data.        Post Anesthesia Care and Evaluation    Patient location during evaluation: PHASE II  Patient participation: complete - patient participated  Level of consciousness: awake  Pain management: adequate    Airway patency: patent  Anesthetic complications: No anesthetic complications    Cardiovascular status: acceptable  Respiratory status: acceptable  Hydration status: acceptable    Comments: /85 (BP Location: Left arm, Patient Position: Sitting)   Pulse 81   Resp 19   Ht 162.6 cm (64\")   Wt 79.8 kg (176 lb)   SpO2 98%   BMI 30.21 kg/m²     "

## 2025-06-03 ENCOUNTER — TELEPHONE (OUTPATIENT)
Dept: GASTROENTEROLOGY | Facility: CLINIC | Age: 55
End: 2025-06-03
Payer: COMMERCIAL

## 2025-06-03 NOTE — TELEPHONE ENCOUNTER
Pt left a voicemail she would like to have someone call and discuss her colonoscopy results. She received them on her my chart but she would like to talk to someone regarding them. Please call pt back at [phone].

## 2025-06-05 NOTE — TELEPHONE ENCOUNTER
Called pt and advised of Dr Onofre's note.  Pt verbalized understanding.       Colonoscopy placed in [facility] and recall for 5/29/26.

## 2025-06-05 NOTE — TELEPHONE ENCOUNTER
Raul Onofre MD to Me (Selected Message)        6/5/25 11:43 AM  The polyp(s) biopsies showed adenomatous change. This is not cancerous but is considered potentially precancerous. Few benign hyperplastic polyps as well.  Follow-up colonoscopy in 1 years is advised.

## 2025-06-17 ENCOUNTER — OFFICE VISIT (OUTPATIENT)
Dept: FAMILY MEDICINE CLINIC | Facility: CLINIC | Age: 55
End: 2025-06-17
Payer: COMMERCIAL

## 2025-06-17 VITALS
HEIGHT: 64 IN | WEIGHT: 176 LBS | OXYGEN SATURATION: 98 % | DIASTOLIC BLOOD PRESSURE: 88 MMHG | HEART RATE: 83 BPM | BODY MASS INDEX: 30.05 KG/M2 | SYSTOLIC BLOOD PRESSURE: 118 MMHG

## 2025-06-17 DIAGNOSIS — M67.40 GANGLION CYST: ICD-10-CM

## 2025-06-17 DIAGNOSIS — E11.9 ENCOUNTER FOR DIABETIC FOOT EXAM: ICD-10-CM

## 2025-06-17 DIAGNOSIS — Z00.00 WELLNESS EXAMINATION: Primary | ICD-10-CM

## 2025-06-17 DIAGNOSIS — I10 ESSENTIAL HYPERTENSION: ICD-10-CM

## 2025-06-17 DIAGNOSIS — E11.9 TYPE 2 DIABETES MELLITUS WITHOUT COMPLICATION, WITHOUT LONG-TERM CURRENT USE OF INSULIN: ICD-10-CM

## 2025-06-17 DIAGNOSIS — M54.16 LUMBAR RADICULOPATHY: ICD-10-CM

## 2025-06-17 DIAGNOSIS — E78.2 MIXED HYPERLIPIDEMIA: ICD-10-CM

## 2025-06-17 RX ORDER — DICLOFENAC SODIUM 75 MG/1
75 TABLET, DELAYED RELEASE ORAL 2 TIMES DAILY
Qty: 60 TABLET | Refills: 0 | Status: SHIPPED | OUTPATIENT
Start: 2025-06-17

## 2025-06-17 NOTE — PROGRESS NOTES
Subjective   Myriam Thomas is a 54 y.o. female. Presents today for   Chief Complaint   Patient presents with    Annual Exam    Diabetes    Hypertension    Hyperlipidemia         History of Present Illness  History of Present Illness  The patient is a 54-year-old female with type 2 diabetes, hypertension, and dyslipidemia, here for her annual wellness exam. As part of her health maintenance, she is due for her blood work, which includes a CMP, A1c, lipid profile, and TSH, to be done through [facility].    She reports experiencing significant back and leg pain, which she attributes to a fall last year. Despite undergoing x-rays and being prescribed muscle relaxers, the pain persists. The pain is constant and radiates down both legs, with the left leg being more affected than the right. She does not experience any numbness or tingling in her legs unless they are crossed. She acknowledges that she is overweight and out of shape and is considering physical therapy as a potential solution. She also plans to incorporate morning walks into her routine.    She experiences phlegm buildup when talking, which she attributes to her smoking habit. She is currently working on quitting smoking.    She had an IUD removed in 02/2025 and experienced light spotting for 4 days afterward. She has not had a period since then. She does not experience hot flashes or night sweats but does get hot and sweaty during physical activity.    She has a ganglion cyst on her right thumb that has been increasing in size. It does not cause her pain, but she finds it bothersome.    GYNECOLOGICAL HISTORY:  - Last Menstrual Period: 02/2025    SOCIAL HISTORY  The patient admits to smoking and is working on quitting.  Review of Systems   Respiratory:  Positive for cough.    Musculoskeletal:  Positive for back pain and myalgias.       Patient Active Problem List   Diagnosis    Mitral valve insufficiency    Palpitations    Essential hypertension     "Tobacco use    Anxiety    Mixed hyperlipidemia    B12 deficiency    Smoker unmotivated to quit    IUD (intrauterine device) in place    Heme positive stool    Rectal bleeding    Colon polyp    Family history of colon cancer    SOB (shortness of breath)    Ganglion cyst -right thumb    Hypersomnia with sleep apnea    PVD (peripheral vascular disease) -left toes digital ischemia       Social History     Socioeconomic History    Marital status:      Spouse name: Roddy    Number of children: 3    Years of education: 12   Tobacco Use    Smoking status: Every Day     Current packs/day: 1.00     Average packs/day: 1 pack/day for 33.5 years (33.5 ttl pk-yrs)     Types: Cigarettes     Start date: 1992     Passive exposure: Past    Smokeless tobacco: Never    Tobacco comments:     I am currently trying to quit   Vaping Use    Vaping status: Never Used   Substance and Sexual Activity    Alcohol use: Yes     Comment: I only consume alcohol occasionally    Drug use: No    Sexual activity: Not Currently     Partners: Male     Birth control/protection: I.U.D.       Allergies   Allergen Reactions    Hydrocodone-Acetaminophen Itching         Objective   Vitals:    06/17/25 0846   BP: 118/88   Pulse: 83   SpO2: 98%   Weight: 79.8 kg (176 lb)   Height: 162.6 cm (64\")     Body mass index is 30.21 kg/m².    Physical Exam  Vitals and nursing note reviewed.   Constitutional:       Appearance: She is well-developed.   HENT:      Head: Normocephalic and atraumatic.   Neck:      Thyroid: No thyromegaly.      Vascular: No JVD.   Cardiovascular:      Rate and Rhythm: Normal rate and regular rhythm.      Heart sounds: Normal heart sounds. No murmur heard.     No friction rub. No gallop.   Pulmonary:      Effort: Pulmonary effort is normal. No respiratory distress.      Breath sounds: Normal breath sounds. No wheezing or rales.   Abdominal:      General: Bowel sounds are normal. There is no distension.      Palpations: Abdomen is soft. "      Tenderness: There is no abdominal tenderness. There is no guarding or rebound.   Musculoskeletal:      Cervical back: Neck supple.      Comments: Right base of thumb soft tissue mass c/w ganglion cyst.   Feet:      Comments: Diabetic foot exam and monofilament completed, see scanned report.        Skin:     General: Skin is warm and dry.   Neurological:      Mental Status: She is alert.      Gait: Gait normal.   Psychiatric:         Behavior: Behavior normal.       Physical Exam  Skin: Ganglion cyst on right thumb    Results       Assessment & Plan   Diagnoses and all orders for this visit:    1. Wellness examination (Primary)    2. Type 2 diabetes mellitus without complication, without long-term current use of insulin  -     Comprehensive Metabolic Panel  -     Lipid Panel  -     Hemoglobin A1c  -     TSH  -     Tirzepatide 2.5 MG/0.5ML solution auto-injector; Inject 2.5 mg under the skin into the appropriate area as directed 1 (One) Time Per Week. Call for dose increases if tolerates  Dispense: 2 mL; Refill: 3    3. Essential hypertension  -     Comprehensive Metabolic Panel    4. Mixed hyperlipidemia  -     Lipid Panel    5. Lumbar radiculopathy  -     MRI Lumbar Spine Without Contrast; Future  -     Ambulatory Referral to Physical Therapy for Evaluation & Treatment  -     diclofenac (VOLTAREN) 75 MG EC tablet; Take 1 tablet by mouth 2 (Two) Times a Day.  Dispense: 60 tablet; Refill: 0    6. Encounter for diabetic foot exam    7. Ganglion cyst - right thumb  -     Ambulatory Referral to Hand Surgery      Counseled on diet, exercise and weight loss     Assessment & Plan  1. Type 2 Diabetes Mellitus.  - Counseled on the importance of diet and exercise in managing her condition.  - Follow-up diabetic visit scheduled in 6 months.  - Mounjaro will be prescribed for weight loss, with dosage to be increased gradually as tolerated. Potential side effects, including gastrointestinal symptoms such as nausea, vomiting,  constipation, or diarrhea, have been discussed.  - Advised to incorporate cardiovascular exercise and resistance training into her routine and ensure adequate protein intake to prevent muscle loss. She can use a discount card from [employer] to reduce the co-pay.    2. Hypertension.  - Advised to continue current medication regimen.  - Monitor blood pressure regularly.    3. Dyslipidemia.  - Advised to continue current medication regimen.  - Maintain a healthy diet low in saturated fats.    4. Chronic Lower Back Pain.  - MRI of the lower back will be ordered to investigate the possibility of a pinched nerve or disk bulge.  - Physical therapy will be initiated to help alleviate symptoms and promote exercise habits.  - Diclofenac will be prescribed for a duration of one month to manage inflammation and pain.    5. Ganglion Cyst on Right Thumb.  - Cyst will be monitored for any changes.  - Surgical removal may be considered if it begins to interfere with function.    6. Smoking-related Phlegm Buildup.  - Advised to continue working on smoking cessation.  - Medication to reduce mucus production will be considered if symptoms persist.    7. Menopause.  - Reported no periods since 02/2025 and no significant symptoms such as hot flashes or night sweats.  - Monitoring will continue to confirm menopause status.    Follow-up  - Follow up in 6 to 8 weeks.          -Follow up:     ________________________________________  Lizandro Barahona DO, MS    Current Outpatient Medications on File Prior to Visit   Medication Sig Dispense Refill    aspirin 81 MG EC tablet Take 1 tablet by mouth Daily.      atorvastatin (LIPITOR) 40 MG tablet Take 1 tablet by mouth Daily. 90 tablet 1    baclofen (LIORESAL) 10 MG tablet Take 1 tablet by mouth 3 (Three) Times a Day As Needed for Muscle Spasms. 90 tablet 0    Cyanocobalamin (FT Vitamin B-12) 2500 MCG sublingual tablet Place 2,500 mcg under the tongue Daily. 90 each 3    famotidine  (PEPCID) 40 MG tablet TAKE ONE TABLET BY MOUTH ONCE NIGHTLY (Patient taking differently: Take 1 tablet by mouth Daily As Needed.) 30 tablet 5    olmesartan-hydrochlorothiazide (Benicar HCT) 40-25 MG per tablet Take 1 tablet by mouth Daily. 90 tablet 3    ondansetron ODT (ZOFRAN-ODT) 4 MG disintegrating tablet PLACE 1 TABLET BY MOUTH EVERY 8 HOURS AS NEEDED FOR NAUSEA AND/OR VOMITING (Patient taking differently: Place 1 tablet on the tongue Every 8 (Eight) Hours As Needed.) 24 tablet 2    varenicline (APO-Varenicline) 1 MG tablet Take 1 tablet by mouth 2 (Two) Times a Day. (Patient taking differently: Take 1 tablet by mouth Every Night.) 60 tablet 3     No current facility-administered medications on file prior to visit.

## 2025-06-17 NOTE — PATIENT INSTRUCTIONS
For more information:    Quit Now [place]  1-800-QUIT-NOW  https://[place].quitlogix.org/en-[place]/  Steps to Quit Smoking  Smoking tobacco can be harmful to your health and can affect almost every organ in your body. Smoking puts you, and those around you, at risk for developing many serious chronic diseases. Quitting smoking is difficult, but it is one of the best things that you can do for your health. It is never too late to quit.  What are the benefits of quitting smoking?  When you quit smoking, you lower your risk of developing serious diseases and conditions, such as:  Lung cancer or lung disease, such as COPD.  Heart disease.  Stroke.  Heart attack.  Infertility.  Osteoporosis and bone fractures.  Additionally, symptoms such as coughing, wheezing, and shortness of breath may get better when you quit. You may also find that you get sick less often because your body is stronger at fighting off colds and infections. If you are pregnant, quitting smoking can help to reduce your chances of having a baby of low birth weight.  How do I get ready to quit?  When you decide to quit smoking, create a plan to make sure that you are successful. Before you quit:  Pick a date to quit. Set a date within the next two weeks to give you time to prepare.  Write down the reasons why you are quitting. Keep this list in places where you will see it often, such as on your bathroom mirror or in your car or wallet.  Identify the people, places, things, and activities that make you want to smoke (triggers) and avoid them. Make sure to take these actions:  Throw away all cigarettes at home, at work, and in your car.  Throw away smoking accessories, such as ashtrays and lighters.  Clean your car and make sure to empty the ashtray.  Clean your home, including curtains and carpets.  Tell your family, friends, and coworkers that you are quitting. Support from your loved ones can make quitting easier.  Talk with your health care provider  about your options for quitting smoking.  Find out what treatment options are covered by your health insurance.  What strategies can I use to quit smoking?  Talk with your healthcare provider about different strategies to quit smoking. Some strategies include:  Quitting smoking altogether instead of gradually lessening how much you smoke over a period of time. Research shows that quitting “cold turkey” is more successful than gradually quitting.  Attending in-person counseling to help you build problem-solving skills. You are more likely to have success in quitting if you attend several counseling sessions. Even short sessions of 10 minutes can be effective.  Finding resources and support systems that can help you to quit smoking and remain smoke-free after you quit. These resources are most helpful when you use them often. They can include:  Online chats with a counselor.  Telephone quitlines.  Printed self-help materials.  Support groups or group counseling.  Text messaging programs.  Mobile phone applications.  Taking medicines to help you quit smoking. (If you are pregnant or breastfeeding, talk with your health care provider first.) Some medicines contain nicotine and some do not. Both types of medicines help with cravings, but the medicines that include nicotine help to relieve withdrawal symptoms. Your health care provider may recommend:  Nicotine patches, gum, or lozenges.  Nicotine inhalers or sprays.  Non-nicotine medicine that is taken by mouth.  Talk with your health care provider about combining strategies, such as taking medicines while you are also receiving in-person counseling. Using these two strategies together makes you more likely to succeed in quitting than if you used either strategy on its own.  If you are pregnant or breastfeeding, talk with your health care provider about finding counseling or other support strategies to quit smoking. Do not take medicine to help you quit smoking unless  told to do so by your health care provider.  What things can I do to make it easier to quit?  Quitting smoking might feel overwhelming at first, but there is a lot that you can do to make it easier. Take these important actions:  Reach out to your family and friends and ask that they support and encourage you during this time. Call telephone quitlines, reach out to support groups, or work with a counselor for support.  Ask people who smoke to avoid smoking around you.  Avoid places that trigger you to smoke, such as bars, parties, or smoke-break areas at work.  Spend time around people who do not smoke.  Lessen stress in your life, because stress can be a smoking trigger for some people. To lessen stress, try:  Exercising regularly.  Deep-breathing exercises.  Yoga.  Meditating.  Performing a body scan. This involves closing your eyes, scanning your body from head to toe, and noticing which parts of your body are particularly tense. Purposefully relax the muscles in those areas.  Download or purchase mobile phone or tablet apps (applications) that can help you stick to your quit plan by providing reminders, tips, and encouragement. There are many free apps, such as QuitGuide from the CDC (Centers for Disease Control and Prevention). You can find other support for quitting smoking (smoking cessation) through smokefree.gov and other websites.  How will I feel when I quit smoking?  Within the first 24 hours of quitting smoking, you may start to feel some withdrawal symptoms. These symptoms are usually most noticeable 2-3 days after quitting, but they usually do not last beyond 2-3 weeks. Changes or symptoms that you might experience include:  Mood swings.  Restlessness, anxiety, or irritation.  Difficulty concentrating.  Dizziness.  Strong cravings for sugary foods in addition to nicotine.  Mild weight gain.  Constipation.  Nausea.  Coughing or a sore throat.  Changes in how your medicines work in your body.  A  depressed mood.  Difficulty sleeping (insomnia).  After the first 2-3 weeks of quitting, you may start to notice more positive results, such as:  Improved sense of smell and taste.  Decreased coughing and sore throat.  Slower heart rate.  Lower blood pressure.  Clearer skin.  The ability to breathe more easily.  Fewer sick days.  Quitting smoking is very challenging for most people. Do not get discouraged if you are not successful the first time. Some people need to make many attempts to quit before they achieve long-term success. Do your best to stick to your quit plan, and talk with your health care provider if you have any questions or concerns.  This information is not intended to replace advice given to you by your health care provider. Make sure you discuss any questions you have with your health care provider.  Document Released: 12/12/2002 Document Revised: 08/15/2017 Document Reviewed: 05/03/2016  [employer] Patient Education © 2017 [employer]  Calorie Counting for Weight Loss  Calories are units of energy. Your body needs a certain number of calories from food to keep going throughout the day. When you eat or drink more calories than your body needs, your body stores the extra calories mostly as fat. When you eat or drink fewer calories than your body needs, your body burns fat to get the energy it needs.  Calorie counting means keeping track of how many calories you eat and drink each day. Calorie counting can be helpful if you need to lose weight. If you eat fewer calories than your body needs, you should lose weight. Ask your health care provider what a healthy weight is for you.  For calorie counting to work, you will need to eat the right number of calories each day to lose a healthy amount of weight per week. A dietitian can help you figure out how many calories you need in a day and will suggest ways to reach your calorie goal.  A healthy amount of weight to lose each week is usually  1-2 lb (0.5-0.9 kg). This usually means that your daily calorie intake should be reduced by 500-750 calories.  Eating 1,200-1,500 calories a day can help most women lose weight.  Eating 1,500-1,800 calories a day can help most men lose weight.  What do I need to know about calorie counting?  Work with your health care provider or dietitian to determine how many calories you should get each day. To meet your daily calorie goal, you will need to:  Find out how many calories are in each food that you would like to eat. Try to do this before you eat.  Decide how much of the food you plan to eat.  Keep a food log. Do this by writing down what you ate and how many calories it had.  To successfully lose weight, it is important to balance calorie counting with a healthy lifestyle that includes regular activity.  Where do I find calorie information?  The number of calories in a food can be found on a Nutrition Facts label. If a food does not have a Nutrition Facts label, try to look up the calories online or ask your dietitian for help.  Remember that calories are listed per serving. If you choose to have more than one serving of a food, you will have to multiply the calories per serving by the number of servings you plan to eat. For example, the label on a package of bread might say that a serving size is 1 slice and that there are 90 calories in a serving. If you eat 1 slice, you will have eaten 90 calories. If you eat 2 slices, you will have eaten 180 calories.  How do I keep a food log?  After each time that you eat, record the following in your food log as soon as possible:  What you ate. Be sure to include toppings, sauces, and other extras on the food.  How much you ate. This can be measured in cups, ounces, or number of items.  How many calories were in each food and drink.  The total number of calories in the food you ate.  Keep your food log near you, such as in a pocket-sized notebook or on an hansel or website on  your mobile phone. Some programs will calculate calories for you and show you how many calories you have left to meet your daily goal.  What are some portion-control tips?  Know how many calories are in a serving. This will help you know how many servings you can have of a certain food.  Use a measuring cup to measure serving sizes. You could also try weighing out portions on a kitchen scale. With time, you will be able to estimate serving sizes for some foods.  Take time to put servings of different foods on your favorite plates or in your favorite bowls and cups so you know what a serving looks like.  Try not to eat straight from a food's packaging, such as from a bag or box. Eating straight from the package makes it hard to see how much you are eating and can lead to overeating. Put the amount you would like to eat in a cup or on a plate to make sure you are eating the right portion.  Use smaller plates, glasses, and bowls for smaller portions and to prevent overeating.  Try not to multitask. For example, avoid watching TV or using your computer while eating. If it is time to eat, sit down at a table and enjoy your food. This will help you recognize when you are full. It will also help you be more mindful of what and how much you are eating.  What are tips for following this plan?  Reading food labels  Check the calorie count compared with the serving size. The serving size may be smaller than what you are used to eating.  Check the source of the calories. Try to choose foods that are high in protein, fiber, and vitamins, and low in saturated fat, trans fat, and sodium.  Shopping  Read nutrition labels while you shop. This will help you make healthy decisions about which foods to buy.  Pay attention to nutrition labels for low-fat or fat-free foods. These foods sometimes have the same number of calories or more calories than the full-fat versions. They also often have added sugar, starch, or salt to make up for  flavor that was removed with the fat.  Make a grocery list of lower-calorie foods and stick to it.  Cooking  Try to cook your favorite foods in a healthier way. For example, try baking instead of frying.  Use low-fat dairy products.  Meal planning  Use more fruits and vegetables. One-half of your plate should be fruits and vegetables.  Include lean proteins, such as chicken, turkey, and fish.  Lifestyle  Each week, aim to do one of the followin minutes of moderate exercise, such as walking.  75 minutes of vigorous exercise, such as running.  General information  Know how many calories are in the foods you eat most often. This will help you calculate calorie counts faster.  Find a way of tracking calories that works for you. Get creative. Try different apps or programs if writing down calories does not work for you.  What foods should I eat?    Eat nutritious foods. It is better to have a nutritious, high-calorie food, such as an avocado, than a food with few nutrients, such as a bag of potato chips.  Use your calories on foods and drinks that will fill you up and will not leave you hungry soon after eating.  Examples of foods that fill you up are nuts and nut butters, vegetables, lean proteins, and high-fiber foods such as whole grains. High-fiber foods are foods with more than 5 g of fiber per serving.  Pay attention to calories in drinks. Low-calorie drinks include water and unsweetened drinks.  The items listed above may not be a complete list of foods and beverages you can eat. Contact a dietitian for more information.  What foods should I limit?  Limit foods or drinks that are not good sources of vitamins, minerals, or protein or that are high in unhealthy fats. These include:  Candy.  Other sweets.  Sodas, specialty coffee drinks, alcohol, and juice.  The items listed above may not be a complete list of foods and beverages you should avoid. Contact a dietitian for more information.  How do I count  calories when eating out?  Pay attention to portions. Often, portions are much larger when eating out. Try these tips to keep portions smaller:  Consider sharing a meal instead of getting your own.  If you get your own meal, eat only half of it. Before you start eating, ask for a container and put half of your meal into it.  When available, consider ordering smaller portions from the menu instead of full portions.  Pay attention to your food and drink choices. Knowing the way food is cooked and what is included with the meal can help you eat fewer calories.  If calories are listed on the menu, choose the lower-calorie options.  Choose dishes that include vegetables, fruits, whole grains, low-fat dairy products, and lean proteins.  Choose items that are boiled, broiled, grilled, or steamed. Avoid items that are buttered, battered, fried, or served with cream sauce. Items labeled as crispy are usually fried, unless stated otherwise.  Choose water, low-fat milk, unsweetened iced tea, or other drinks without added sugar. If you want an alcoholic beverage, choose a lower-calorie option, such as a glass of wine or light beer.  Ask for dressings, sauces, and syrups on the side. These are usually high in calories, so you should limit the amount you eat.  If you want a salad, choose a garden salad and ask for grilled meats. Avoid extra toppings such as whitaker, cheese, or fried items. Ask for the dressing on the side, or ask for olive oil and vinegar or lemon to use as dressing.  Estimate how many servings of a food you are given. Knowing serving sizes will help you be aware of how much food you are eating at restaurants.  Where to find more information  Centers for Disease Control and Prevention: www.cdc.gov  [place] Department of Agriculture: myplate.gov  Summary  Calorie counting means keeping track of how many calories you eat and drink each day. If you eat fewer calories than your body needs, you should lose weight.  A  healthy amount of weight to lose per week is usually 1-2 lb (0.5-0.9 kg). This usually means reducing your daily calorie intake by 500-750 calories.  The number of calories in a food can be found on a Nutrition Facts label. If a food does not have a Nutrition Facts label, try to look up the calories online or ask your dietitian for help.  Use smaller plates, glasses, and bowls for smaller portions and to prevent overeating.  Use your calories on foods and drinks that will fill you up and not leave you hungry shortly after a meal.  This information is not intended to replace advice given to you by your health care provider. Make sure you discuss any questions you have with your health care provider.  Document Revised: 01/28/2021 Document Reviewed: 01/28/2021  [employer] Patient Education © 2022 [employer]    Exercising to Lose Weight  Getting regular exercise is important for everyone. It is especially important if you are overweight. Being overweight increases your risk of heart disease, stroke, diabetes, high blood pressure, and several types of cancer. Exercising, and reducing the calories you consume, can help you lose weight and improve fitness and health.  Exercise can be moderate or vigorous intensity. To lose weight, most people need to do a certain amount of moderate or vigorous-intensity exercise each week.  How can exercise affect me?  You lose weight when you exercise enough to burn more calories than you eat. Exercise also reduces body fat and builds muscle. The more muscle you have, the more calories you burn. Exercise also:  Improves mood.  Reduces stress and tension.  Improves your overall fitness, flexibility, and endurance.  Increases bone strength.  Moderate-intensity exercise  Moderate-intensity exercise is any activity that gets you moving enough to burn at least three times more energy (calories) than if you were sitting.  Examples of moderate exercise include:  Walking a mile in 15  minutes.  Doing light yard work.  Biking at an easy pace.  Most people should get at least 150 minutes of moderate-intensity exercise a week to maintain their body weight.  Vigorous-intensity exercise  Vigorous-intensity exercise is any activity that gets you moving enough to burn at least six times more calories than if you were sitting. When you exercise at this intensity, you should be working hard enough that you are not able to carry on a conversation.  Examples of vigorous exercise include:  Running.  Playing a team sport, such as football, basketball, and soccer.  Jumping rope.  Most people should get at least 75 minutes a week of vigorous exercise to maintain their body weight.  What actions can I take to lose weight?  The amount of exercise you need to lose weight depends on:  Your age.  The type of exercise.  Any health conditions you have.  Your overall physical ability.  Talk to your health care provider about how much exercise you need and what types of activities are safe for you.  Nutrition    Make changes to your diet as told by your health care provider or diet and nutrition specialist (dietitian). This may include:  Eating fewer calories.  Eating more protein.  Eating less unhealthy fats.  Eating a diet that includes fresh fruits and vegetables, whole grains, low-fat dairy products, and lean protein.  Avoiding foods with added fat, salt, and sugar.  Drink plenty of water while you exercise to prevent dehydration or heat stroke.  Activity  Choose an activity that you enjoy and set realistic goals. Your health care provider can help you make an exercise plan that works for you.  Exercise at a moderate or vigorous intensity most days of the week.  The intensity of exercise may vary from person to person. You can tell how intense a workout is for you by paying attention to your breathing and heartbeat. Most people will notice their breathing and heartbeat get faster with more intense exercise.  Do  resistance training twice each week, such as:  Push-ups.  Sit-ups.  Lifting weights.  Using resistance bands.  Getting short amounts of exercise can be just as helpful as long, structured periods of exercise. If you have trouble finding time to exercise, try doing these things as part of your daily routine:  Get up, stretch, and walk around every 30 minutes throughout the day.  Go for a walk during your lunch break.  Park your car farther away from your destination.  If you take public transportation, get off one stop early and walk the rest of the way.  Make phone calls while standing up and walking around.  Take the stairs instead of elevators or escalators.  Wear comfortable clothes and shoes with good support.  Do not exercise so much that you hurt yourself, feel dizzy, or get very short of breath.  Where to find more information  [place] Department of Health and Human Services: www.hhs.gov  Centers for Disease Control and Prevention: www.cdc.gov  Contact a health care provider:  Before starting a new exercise program.  If you have questions or concerns about your weight.  If you have a medical problem that keeps you from exercising.  Get help right away if:  You have any of the following while exercising:  Injury.  Dizziness.  Difficulty breathing or shortness of breath that does not go away when you stop exercising.  Chest pain.  Rapid heartbeat.  These symptoms may represent a serious problem that is an emergency. Do not wait to see if the symptoms will go away. Get medical help right away. Call your local emergency services (911 in the [place] Do not drive yourself to the hospital.  Summary  Getting regular exercise is especially important if you are overweight.  Being overweight increases your risk of heart disease, stroke, diabetes, high blood pressure, and several types of cancer.  Losing weight happens when you burn more calories than you eat.  Reducing the amount of calories you eat, and getting regular  moderate or vigorous exercise each week, helps you lose weight.  This information is not intended to replace advice given to you by your health care provider. Make sure you discuss any questions you have with your health care provider.  Document Revised: 02/13/2022 Document Reviewed: 02/13/2022  [employer] Patient Education © 2022 [employer]

## 2025-06-18 DIAGNOSIS — M54.41 ACUTE BILATERAL LOW BACK PAIN WITH BILATERAL SCIATICA: ICD-10-CM

## 2025-06-18 DIAGNOSIS — M54.42 ACUTE BILATERAL LOW BACK PAIN WITH BILATERAL SCIATICA: ICD-10-CM

## 2025-06-18 DIAGNOSIS — R07.89 CHEST WALL PAIN: ICD-10-CM

## 2025-06-19 LAB
ALBUMIN SERPL ELPH-MCNC: 4.4 G/DL
ALP SERPL-CCNC: 87 U/L (ref 39–117)
ALT SERPL W P-5'-P-CCNC: 44 U/L (ref 1–33)
AST SERPL-CCNC: 37 U/L (ref 1–32)
BILIRUB SERPL-MCNC: 0.5 MG/DL (ref 0–1.2)
BUN SERPL-MCNC: 11 MG/DL (ref 6–20)
CHLORIDE SERPL-SCNC: 102 MMOL/L (ref 98–107)
CHOLEST SERPL-MCNC: 109 MG/DL
CO2 SERPL-SCNC: 27 MMOL/L
COM CRY STONE QL IR: 9.7
CREATININE: 0.94
EGFR GENE MUT ANL BLD/T: 73
GLOBULIN UR ELPH-MCNC: 2.2 GM/DL
GLUCOSE BLD-MCNC: 110 MG/DL
HBA1C MFR BLD: 6.8 % (ref 4.8–5.9)
HDLC SERPL QL: 3.6
HDLC SERPL-MCNC: 30 MG/DL (ref 40–60)
INSULIN SERPL-ACNC: 2 CAL
LDL CHOL. (DIRECT): 53 MG/DL
NON HDL CHOLESTEROL: 79 MG/DL
POTASSIUM BLD-SCNC: 3.8 MMOL/L
PROT SERPL-MCNC: 6.6 G/DL (ref 6–8.5)
SODIUM BLD-SCNC: 141 MMOL/L
TRIGL SERPL-MCNC: 183 MG/DL (ref 0–150)
TSH SERPL DL<=0.05 MIU/L-ACNC: 1.45 UIU/ML (ref 0.27–4.2)

## 2025-06-19 RX ORDER — BACLOFEN 10 MG/1
10 TABLET ORAL 3 TIMES DAILY PRN
Qty: 90 TABLET | Refills: 5 | Status: SHIPPED | OUTPATIENT
Start: 2025-06-19

## 2025-07-07 ENCOUNTER — TREATMENT (OUTPATIENT)
Dept: PHYSICAL THERAPY | Facility: CLINIC | Age: 55
End: 2025-07-07
Payer: COMMERCIAL

## 2025-07-07 DIAGNOSIS — G89.29 CHRONIC BILATERAL LOW BACK PAIN WITH BILATERAL SCIATICA: Primary | ICD-10-CM

## 2025-07-07 DIAGNOSIS — M54.41 CHRONIC BILATERAL LOW BACK PAIN WITH BILATERAL SCIATICA: Primary | ICD-10-CM

## 2025-07-07 DIAGNOSIS — M54.42 CHRONIC BILATERAL LOW BACK PAIN WITH BILATERAL SCIATICA: Primary | ICD-10-CM

## 2025-07-07 DIAGNOSIS — M54.16 RADICULOPATHY, LUMBAR REGION: ICD-10-CM

## 2025-07-07 PROCEDURE — 97162 PT EVAL MOD COMPLEX 30 MIN: CPT | Performed by: PHYSICAL THERAPIST

## 2025-07-07 PROCEDURE — 97530 THERAPEUTIC ACTIVITIES: CPT | Performed by: PHYSICAL THERAPIST

## 2025-07-07 PROCEDURE — 97110 THERAPEUTIC EXERCISES: CPT | Performed by: PHYSICAL THERAPIST

## 2025-07-07 NOTE — PROGRESS NOTES
Physical Therapy Initial Evaluation and Plan of Care  [facility]  [address], [address], [place] [address]    Patient: Myriam Thomas   : 1970  Diagnosis/ICD-10 Code:  Chronic bilateral low back pain with bilateral sciatica [M54.42, M54.41, G89.29]  Referring practitioner: Lizandro Barahona DO  Date: 2025    Subjective Evaluation    History of Present Illness  Mechanism of injury: Patient reports she fell from her back deck in 2024.  She initially had some soreness and bruising, with worsening pain over the next 3 weeks.  She did suffer a posterior (L) rib fracture during the fall.  She presented to PCP who prescribed baclofen. Patient reports needing 2 of these in order to sleep at night.  The pain has persisted in (L) lower lumbar region, worsened by standing, sometimes travelling across lumbar region or down (L) lateral thigh to the knee or occasionally (R) lateral thigh to the knee.  Symptoms travelling down LEs began about 6 months ago. She also has pain with prolonged sitting or walking.     Her sleep has been improved, requiring one baclofen, and she takes 2-4 Tylenol during the day. Mild temporary improvement with heat.      MRI scheduled for . PMH notable for DM 2 (currently no medication), hyperlipidemia, anxiety, depression, and GERD.      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 house, reading Pain  Current pain ratin  At best pain ratin  At worst pain rating: 10  Location: (L) > (R) lumbar region, (L) > (R) LE  Quality: dull ache, knife-like and sharp  Relieving factors: heat  Aggravating factors: standing, prolonged positioning, ambulation and repetitive movement  Progression: no change    Diagnostic Tests  X-ray: normal    Patient Goals  Patient goals for therapy: decreased pain  Patient goal: try to ease the pain or get rid of it altogether           Subjective Questionnaire: Oswestry: 16/50 =  "32%     Objective          Tenderness     Additional Tenderness Details  Mod (+) TTP (R) upper lumbar paraspinals, mod (+) TTP (L) middle lumbar paraspinals    Neurological Testing     Sensation     Lumbar   Left   Intact: light touch    Right   Intact: light touch    Additional Neurological Details  Intact and equal (B) LE dermatomes    Active Range of Motion     Additional Active Range of Motion Details  Lumbar AROM (%):  Flexion 100%  Extension 50%, \"feels like it needs to pop\"  Right lateral flexion 50%, \"stretching\" (L) lumbar  Left lateral flexion 50%, \"stretching\" (R) lumbar  Right rotation 75%, min pain (L) lumbar  Left rotation 75%, mod pain (R) lumbar    Strength/Myotome Testing     Left Hip   Planes of Motion   Flexion: 4    Right Hip   Planes of Motion   Flexion: 4    Left Knee   Flexion: 4-  Extension: 4+    Right Knee   Flexion: 4  Extension: 4+    Left Ankle/Foot   Dorsiflexion: 4+    Right Ankle/Foot   Dorsiflexion: 4+          Assessment & Plan       Assessment  Impairments: abnormal muscle firing, abnormal or restricted ROM, activity intolerance, impaired physical strength, lacks appropriate home exercise program and pain with function   Functional limitations: carrying objects, lifting, sleeping, walking, pulling, pushing, uncomfortable because of pain, sitting, standing, stooping and unable to perform repetitive tasks   Assessment details: Patient is a 54 y.o female who reports onset of (L)> (R) lumbar pain in March 2024 when she fell from her deck.  She sustained a (L) rib fx during the fall.  Lumbar pain has persisted and began travelling down the lateral aspect of each thigh, (L) > (R), about 6 months ago. She describes symptoms rated 0-10/10, worst with prolonged standing, walking, or sitting as well as during sleep.  She exhibits tenderness of (B) lumbar paraspinal musculature, decreased and painful lumbar AROM, and decreased proximal (B) LE strength.  Her Oswestry score of 32% indicates a " moderate perceived level of functional limitation.  She will benefit from skilled PT services to address these deficits and assist patient in painfree return to all positional and activity tolerance for uncompensated performance of ADLs, functional mobility, and work tasks.  Prognosis: good    Goals  Plan Goals: STGs: to be met in 6 weeks  1. Patient will be independent with initial HEP  2. Patient will report improved lumbar symptoms 0-6/10 for improved activity and positional tolerance  3. Patient will report 50% decreased frequency and intensity of (B) LE radicular symptoms as evidence of appropriate healing  4. Patient will report consistently sleeping 5-6 hour intervals without pain interruption without necessity of baclofen for improved restorative healing  5. Patient will exhibit negative TTP for evidence of resolving inflammation    LTGs: to be met in 12 weeks  1. Patient will be independent with progressed HEP  2. Patient will report improved lumbar symptoms 0-2/10 for improved tolerance to prolonged positions and activities  3. Patient will report resolution of (B) LE radicular symptoms as evidence of appropriate healing  4. Patient will demonstrate improved lumbar AROM >/= 75% all planes painfree for improved positional tolerance  5. Patient will demonstrate sufficient functional strength of trunk and (B) LEs to effectively support lumbar spine during prolonged and repetitive positions and activities  6. Patient will have improved Oswestry score </= 20% for subjective evidence of functional improvement    Plan  Therapy options: will be seen for skilled therapy services  Planned modality interventions: cryotherapy, thermotherapy (hydrocollator packs) and electrical stimulation/Russian stimulation  Planned therapy interventions: abdominal trunk stabilization, body mechanics training, flexibility, functional ROM exercises, home exercise program, joint mobilization, manual therapy, neuromuscular re-education,  postural training, soft tissue mobilization, spinal/joint mobilization, strengthening, stretching and therapeutic activities  Frequency: 2x week  Duration in weeks: 12  Treatment plan discussed with: patient        TIMED:  Manual Therapy:         mins  45836;  Therapeutic Exercise:    15     mins  64504;     Neuromuscular Malgorzata:        mins  82589;    Therapeutic Activity:     11     mins  57817;     Gait Training:           mins  59690;     Ultrasound:          mins  63625;    Work Conditioning             mins 12950    UNTIMED:  Electrical Stimulation:         mins  41309 ( );  Dry Needling          mins 20561    Timed Treatment:   26   mins   Total Treatment:     49   mins    PT SIGNATURE: Liz Marshall PT, DPT, OCS  Electronically signed by: Liz Marshall PT, 07/07/25, 8:29 AM EDT  [place] License #[id number]     DATE TREATMENT INITIATED: 7/7/2025    Initial Certification  Certification Period: 7/7/2025 thru 10/4/2025  I certify that the therapy services are furnished while this patient is under my care.  The services outlined above are required by this patient, and will be reviewed every 90 days.     PHYSICIAN: Lizandro Barahona DO  [id number]                                          DATE:     Please sign and return via fax to [phone]. Thank you, [facility].

## 2025-07-10 DIAGNOSIS — R11.0 NAUSEA: ICD-10-CM

## 2025-07-11 ENCOUNTER — TREATMENT (OUTPATIENT)
Dept: PHYSICAL THERAPY | Facility: CLINIC | Age: 55
End: 2025-07-11
Payer: COMMERCIAL

## 2025-07-11 DIAGNOSIS — G89.29 CHRONIC BILATERAL LOW BACK PAIN WITH BILATERAL SCIATICA: Primary | ICD-10-CM

## 2025-07-11 DIAGNOSIS — M54.41 CHRONIC BILATERAL LOW BACK PAIN WITH BILATERAL SCIATICA: Primary | ICD-10-CM

## 2025-07-11 DIAGNOSIS — M54.16 RADICULOPATHY, LUMBAR REGION: ICD-10-CM

## 2025-07-11 DIAGNOSIS — M54.42 CHRONIC BILATERAL LOW BACK PAIN WITH BILATERAL SCIATICA: Primary | ICD-10-CM

## 2025-07-11 PROCEDURE — 97112 NEUROMUSCULAR REEDUCATION: CPT | Performed by: PHYSICAL THERAPIST

## 2025-07-11 PROCEDURE — 97110 THERAPEUTIC EXERCISES: CPT | Performed by: PHYSICAL THERAPIST

## 2025-07-11 RX ORDER — ONDANSETRON 4 MG/1
4 TABLET, ORALLY DISINTEGRATING ORAL EVERY 8 HOURS PRN
Qty: 24 TABLET | Refills: 1 | Status: SHIPPED | OUTPATIENT
Start: 2025-07-11

## 2025-07-11 NOTE — PROGRESS NOTES
Physical Therapy Daily Treatment Note       [facility]           [address], [address], [place] [address]    Patient: Myriam Thomas   : 1970  Referring practitioner: Lizandro Barahona DO  Date of Initial Visit: Type: THERAPY  Noted: 2025  Today's Date: 2025  Patient seen for 2 sessions         Visit Diagnoses:     ICD-10-CM ICD-9-CM   1. Chronic bilateral low back pain with bilateral sciatica  M54.42 724.2    M54.41 724.3    G89.29 338.29   2. Radiculopathy, lumbar region  M54.16 724.4         Subjective Evaluation    History of Present Illness    Subjective comment: I've had a few episodes this week where the pain was more intense than I'd like it to be [but no particular pattern/activity].  I've been more conscious of trying to get up and walk around more which is helping a little [during work day] but not making a huge difference yet.Pain  Pain scale: currently no LE pain, low grade pain lumbar.           Objective   See Exercise, Manual, and Modality Logs for complete treatment.   *Initiated hip add virginia vs ball, abdominal bracing, and supine glut set      Assessment & Plan       Assessment  Assessment details: Patient has begun utilizing strategy as instructed to take frequent, short walks throughout the day to break up prolonged sitting position required by work.  She exhibits improved ROM arc with performance of sciatic nerve glides, though reported neural tension persists to lesser degree.  She is educated at length regarding deep abdominals, specifically transverse abdominis, regarding its role in stabilizing and supporting lumbar spine.  She demonstrates fair TA engagement but early onset of muscular fatigue and tendency to hold her breath, therefore she benefits from verbal cueing to avoid holding breath during performance.  She is cued to perform this exercise frequently and in varying positions to build muscular endurance. She verbalizes  understanding and intent to comply.          Progress per Plan of Care         TIMED:  Manual Therapy:         mins  09338;  Therapeutic Exercise:    28     mins  39059;     Neuromuscular Malgorzata:    8    mins  30189;    Therapeutic Activity:          mins  46322;     Gait Training:           mins  25309;     Ultrasound:          mins  89219;    Work Conditioning             mins 53973    UNTIMED:  Electrical Stimulation:         mins  42489 ( );  Dry Needling          mins 31952    Timed Treatment:   36   mins   Total Treatment:     36   mins    Liz Marshall PT, DPT, [facility]  Physical Therapist  [place] License #[id number]  Electronically signed by: Liz Marshall PT, 07/11/25, 7:26 AM EDT

## 2025-07-29 ENCOUNTER — PATIENT ROUNDING (BHMG ONLY) (OUTPATIENT)
Dept: FAMILY MEDICINE CLINIC | Facility: CLINIC | Age: 55
End: 2025-07-29

## 2025-07-29 ENCOUNTER — OFFICE VISIT (OUTPATIENT)
Dept: FAMILY MEDICINE CLINIC | Facility: CLINIC | Age: 55
End: 2025-07-29
Payer: COMMERCIAL

## 2025-07-29 VITALS
HEIGHT: 64 IN | SYSTOLIC BLOOD PRESSURE: 120 MMHG | HEART RATE: 94 BPM | WEIGHT: 175 LBS | OXYGEN SATURATION: 98 % | BODY MASS INDEX: 29.88 KG/M2 | DIASTOLIC BLOOD PRESSURE: 86 MMHG

## 2025-07-29 DIAGNOSIS — R60.0 PEDAL EDEMA: ICD-10-CM

## 2025-07-29 DIAGNOSIS — M54.16 LUMBAR RADICULOPATHY: ICD-10-CM

## 2025-07-29 DIAGNOSIS — E11.9 TYPE 2 DIABETES MELLITUS WITHOUT COMPLICATION, WITHOUT LONG-TERM CURRENT USE OF INSULIN: Primary | ICD-10-CM

## 2025-07-29 PROCEDURE — 96372 THER/PROPH/DIAG INJ SC/IM: CPT | Performed by: FAMILY MEDICINE

## 2025-07-29 PROCEDURE — 99214 OFFICE O/P EST MOD 30 MIN: CPT | Performed by: FAMILY MEDICINE

## 2025-07-29 RX ORDER — TRIAMCINOLONE ACETONIDE 40 MG/ML
80 INJECTION, SUSPENSION INTRA-ARTICULAR; INTRAMUSCULAR ONCE
Status: COMPLETED | OUTPATIENT
Start: 2025-07-29 | End: 2025-07-29

## 2025-07-29 RX ADMIN — TRIAMCINOLONE ACETONIDE 80 MG: 40 INJECTION, SUSPENSION INTRA-ARTICULAR; INTRAMUSCULAR at 12:19

## 2025-07-29 NOTE — PROGRESS NOTES
Subjective   Myriam Thomas is a 54 y.o. female. Presents today for   Chief Complaint   Patient presents with    Foot Swelling     Bilateral     Diabetes    Back Pain         Hypertension  Associated symptoms: headaches    Associated symptoms: no blurred vision, no chest pain, no palpitations, no shortness of breath and no dizziness    Hyperlipidemia  Pertinent negatives include no chest pain or shortness of breath.   Diabetes  Associated symptoms:     no blurred vision, no chest pain, no fatigue, no foot ulcerations and no weight loss    Hypoglycemia symptoms:     headaches      no confusion and no dizziness    Primary Care Follow-Up  Conditions present: diabetes, hypertension and hyperlipidemia    Current symptoms: dry mouth and headaches      Current symptoms: no chest pain, no confusion, no dizziness, no nausea, no palpitations, no shortness of breath, no fatigue, no weight gain, no weight loss, no blurred vision and no foot ulcerations    Treatment barriers:  No complaince problems  Exercise:  Rarely  Diabetes:     Current treatments:  None    Home blood tests:  1-2 x week    Meal planning:  None    Eye exam current: yes      Sees podiatrist: no      History of Present Illness  The patient is a 54-year-old female here for a 6 to 8-week follow-up from her last office visit, where she was complaining about back pain with some radicular symptoms. She was given diclofenac, an NSAID, and referred to physical therapy. Additionally, the initiation of Mounjaro was discussed, and a prescription for topiramate 25 mg twice a day was provided to aid in weight loss. She is here for a recheck of her diabetes, new medication, and her back pain.    She reports that her back pain has slightly improved but not significantly. She attended 2 or 3 physical therapy sessions but missed one on 07/14/2025 due to vacation. She also canceled 3 appointments last week due to an upcoming hand surgery scheduled for 08/12/2025 with Dr. Tobias.  An MRI is scheduled for Thursday, and she is considering canceling her physical therapy appointment tomorrow until after the surgery. She continues to experience radiating pain down her legs, which now starts in her legs and travels up to her back.    She experienced foot swelling during a recent cruise, which she attributes to increased activity. The swelling persisted even after returning home. She also experienced burning sensations in her heels when walking and tingling when sitting. Her legs are now back to normal size.    She is unsure if the Mounjaro once-a-week injection is covered by her insurance. She went to [facility] to get it, but they told her that a prior authorization (PA) was required and that her provider had been notified. She waited a few days and then sent a message. The person who called her back stated that the provider was not aware of it. They said they would request a PA, but she has not heard anything since then. She is considering changing to [facility] because twice now they tried to give her olmesartan 12.5 mg instead of the prescribed 40 mg. She does not understand why her insurance is asking for a PA because her best friend, who has the same insurance, did not need a PA for Mounjaro. She thinks it might be because she has not been on any type of diabetic medicine since 03/2025.  Review of Systems   Constitutional:  Negative for fatigue, weight gain and weight loss.   Eyes:  Negative for blurred vision.   Respiratory:  Negative for shortness of breath.    Cardiovascular:  Negative for chest pain and palpitations.   Gastrointestinal:  Negative for nausea.   Musculoskeletal:  Positive for back pain.   Neurological:  Positive for headaches. Negative for dizziness.   Psychiatric/Behavioral:  Negative for confusion.        Patient Active Problem List   Diagnosis    Mitral valve insufficiency    Palpitations    Essential hypertension    Tobacco use    Anxiety    Mixed hyperlipidemia    B12  "deficiency    Smoker unmotivated to quit    IUD (intrauterine device) in place    Heme positive stool    Rectal bleeding    Colon polyp    Family history of colon cancer    SOB (shortness of breath)    Ganglion cyst -right thumb    Hypersomnia with sleep apnea    PVD (peripheral vascular disease) -left toes digital ischemia       Social History     Socioeconomic History    Marital status:      Spouse name: Roddy    Number of children: 3    Years of education: 12   Tobacco Use    Smoking status: Every Day     Current packs/day: 1.00     Average packs/day: 1 pack/day for 33.6 years (33.6 ttl pk-yrs)     Types: Cigarettes     Start date: 1992     Passive exposure: Current    Smokeless tobacco: Never    Tobacco comments:     I am currently trying to quit   Vaping Use    Vaping status: Never Used   Substance and Sexual Activity    Alcohol use: Yes     Comment: I only consume alcohol occasionally    Drug use: No    Sexual activity: Not Currently     Partners: Male     Birth control/protection: I.U.D.       Allergies   Allergen Reactions    Hydrocodone-Acetaminophen Itching         Objective   Vitals:    07/29/25 1150   BP: 120/86   Pulse: 94   SpO2: 98%   Weight: 79.4 kg (175 lb)   Height: 162.6 cm (64\")     Body mass index is 30.04 kg/m².    Physical Exam  Vitals and nursing note reviewed.   Constitutional:       Appearance: Normal appearance. She is not toxic-appearing or diaphoretic.   HENT:      Head: Normocephalic and atraumatic.   Musculoskeletal:      Cervical back: Neck supple.   Skin:     General: Skin is warm and dry.      Capillary Refill: Capillary refill takes less than 2 seconds.   Neurological:      Mental Status: She is alert.   Psychiatric:         Mood and Affect: Mood normal.         Behavior: Behavior normal.       Physical Exam  Extremities: No swelling noted in the legs.    Results  Component      Latest Ref Rng 6/17/2025   Glucose 110    BUN      6.0 - 20.0 mg/dL 11.0    CREATININE 0.94  "   EGFR Result 73    Sodium      mmol/L 141    Potassium      mmol/L 3.8    Chloride      98 - 107 mmol/L 102    CO2      mmol/L 27    Calcium Oxalate 9.7    Total Protein      6.0 - 8.5 g/dL 6.6    Albumin 4.4    Globulin      gm/dL 2.2    A/G Ratio      vicente 2.0    Total Bilirubin      0.0 - 1.2 mg/dL 0.5    Alkaline Phosphatase      39 - 117 U/L 87    AST (SGOT)      1 - 32 U/L 37 !    ALT (SGPT)      1 - 33 U/L 44 !    Total Cholesterol      mg/dL 109    HDL Cholesterol      40 - 60 mg/dL 30 !    Triglycerides      0 - 150 mg/dL 183 !    LDL Cholesterol       mg/dL 53    Chol/HDL Ratio 3.60    Non HDL Cholesterol (LDL+VLDL)      mg/dL 79    Hemoglobin A1C      4.8 - 5.9 % 6.8 !    TSH Baseline      0.270 - 4.200 uIU/mL 1.450       Legend:  ! Abnormal    MRI l-spine pending 7/31/25     Assessment & Plan   Diagnoses and all orders for this visit:    1. Type 2 diabetes mellitus without complication, without long-term current use of insulin (Primary)  -     Tirzepatide 2.5 MG/0.5ML solution auto-injector; Inject 2.5 mg under the skin into the appropriate area as directed 1 (One) Time Per Week. Call for dose increases if tolerates  Dispense: 2 mL; Refill: 5    2. Lumbar radiculopathy  -     triamcinolone acetonide (KENALOG-40) injection 80 mg    3. Pedal edema           Assessment & Plan  1. Back pain.  - Reports some improvement but not significant.  - Attended a few physical therapy sessions; missed recent appointments due to upcoming hand surgery.  - Advised to continue physical therapy as it will not interfere with surgery.  - Kenalog Depo shot administered today; MRI scheduled for Thursday, results to be communicated once available.    2. Diabetes Mellitus.  - Here for a recheck of diabetes and new medication.  - Not on any diabetic medication since 03/2025.  - Prescription for Mounjaro once a week injection requires prior authorization (PA); PA request to be resent and followed up with the pharmacy.  - Advised to  be persistent with the insurance process.    3. Foot swelling.  - Experienced significant foot swelling during a recent trip, which has since resolved.  - Advised to inform the provider immediately if swelling recurs   Follow-up: The patient will follow up in 3 months.          -Follow up:     ________________________________________  Lizandro Barahona DO, MS    Current Outpatient Medications on File Prior to Visit   Medication Sig Dispense Refill    aspirin 81 MG EC tablet Take 1 tablet by mouth Daily.      atorvastatin (LIPITOR) 40 MG tablet Take 1 tablet by mouth Daily. 90 tablet 1    baclofen (LIORESAL) 10 MG tablet TAKE ONE TABLET BY MOUTH THREE TIMES A DAY AS NEEDED FOR MUSCLE SPASMS 90 tablet 5    Cyanocobalamin (FT Vitamin B-12) 2500 MCG sublingual tablet Place 2,500 mcg under the tongue Daily. 90 each 3    diclofenac (VOLTAREN) 75 MG EC tablet Take 1 tablet by mouth 2 (Two) Times a Day. 60 tablet 0    famotidine (PEPCID) 40 MG tablet TAKE ONE TABLET BY MOUTH ONCE NIGHTLY (Patient taking differently: Take 1 tablet by mouth Daily As Needed.) 30 tablet 5    olmesartan-hydrochlorothiazide (Benicar HCT) 40-25 MG per tablet Take 1 tablet by mouth Daily. 90 tablet 3    ondansetron ODT (ZOFRAN-ODT) 4 MG disintegrating tablet Place 1 tablet on the tongue Every 8 (Eight) Hours As Needed for Nausea or Vomiting. 24 tablet 1    varenicline (APO-Varenicline) 1 MG tablet Take 1 tablet by mouth 2 (Two) Times a Day. (Patient taking differently: Take 1 tablet by mouth Every Night.) 60 tablet 3    [DISCONTINUED] Tirzepatide 2.5 MG/0.5ML solution auto-injector Inject 2.5 mg under the skin into the appropriate area as directed 1 (One) Time Per Week. Call for dose increases if tolerates 2 mL 3     No current facility-administered medications on file prior to visit.

## 2025-07-29 NOTE — PROGRESS NOTES
A [place] message has been sent to the patient for PATIENT ROUNDING with [facility].

## 2025-07-30 ENCOUNTER — TELEPHONE (OUTPATIENT)
Dept: FAMILY MEDICINE CLINIC | Facility: CLINIC | Age: 55
End: 2025-07-30
Payer: COMMERCIAL

## 2025-07-30 NOTE — TELEPHONE ENCOUNTER
Stephany see below, let her know it is covered and they getting ready.  It did not need a PA.  MANDIEJ

## 2025-07-30 NOTE — TELEPHONE ENCOUNTER
----- Message from Carolina SARMIENTO sent at 7/30/2025  3:37 PM EDT -----  Regarding: RE: PA  PA WAS NOT NEEDED WHEN RAN THROUGH. I CALLED PHARMACY AND APPARENTLY THEY HAD A CLERICAL ERROR ON THEIR END. THEY RAN IT THROUGH AGAIN AND IT IS COVERED. THEY ARE PREPARING IT NOW.  ----- Message -----  From: Lizandro Barahona DO  Sent: 7/29/2025  12:02 PM EDT  To: Carolina Abbott MA  Subject: PA                                               Did we ever get PA results on mounjaro?   RRJ

## 2025-07-31 ENCOUNTER — HOSPITAL ENCOUNTER (OUTPATIENT)
Dept: MRI IMAGING | Facility: HOSPITAL | Age: 55
Discharge: HOME | End: 2025-07-31
Admitting: FAMILY MEDICINE
Payer: COMMERCIAL

## 2025-07-31 DIAGNOSIS — M54.16 LUMBAR RADICULOPATHY: ICD-10-CM

## 2025-07-31 PROCEDURE — 72148 MRI LUMBAR SPINE W/O DYE: CPT

## 2025-08-05 ENCOUNTER — PRE-ADMISSION TESTING (OUTPATIENT)
Dept: PREADMISSION TESTING | Facility: HOSPITAL | Age: 55
End: 2025-08-05
Payer: COMMERCIAL

## 2025-08-05 VITALS
TEMPERATURE: 98 F | DIASTOLIC BLOOD PRESSURE: 97 MMHG | HEART RATE: 76 BPM | SYSTOLIC BLOOD PRESSURE: 179 MMHG | BODY MASS INDEX: 30.42 KG/M2 | OXYGEN SATURATION: 96 % | HEIGHT: 64 IN | RESPIRATION RATE: 16 BRPM | WEIGHT: 178.2 LBS

## 2025-08-05 LAB
ANION GAP SERPL CALCULATED.3IONS-SCNC: 10.2 MMOL/L (ref 5–15)
BUN SERPL-MCNC: 10 MG/DL (ref 6–20)
BUN/CREAT SERPL: 12.7 (ref 7–25)
CALCIUM SPEC-SCNC: 9.2 MG/DL (ref 8.6–10.5)
CHLORIDE SERPL-SCNC: 105 MMOL/L (ref 98–107)
CO2 SERPL-SCNC: 24.8 MMOL/L (ref 22–29)
CREAT SERPL-MCNC: 0.79 MG/DL (ref 0.57–1)
DEPRECATED RDW RBC AUTO: 42.1 FL (ref 37–54)
EGFRCR SERPLBLD CKD-EPI 2021: 90.1 ML/MIN/1.73
ERYTHROCYTE [DISTWIDTH] IN BLOOD BY AUTOMATED COUNT: 12.8 % (ref 12.3–15.4)
GLUCOSE SERPL-MCNC: 125 MG/DL (ref 65–99)
HCT VFR BLD AUTO: 39.1 % (ref 34–46.6)
HGB BLD-MCNC: 13.2 G/DL (ref 12–15.9)
MCH RBC QN AUTO: 30.6 PG (ref 26.6–33)
MCHC RBC AUTO-ENTMCNC: 33.8 G/DL (ref 31.5–35.7)
MCV RBC AUTO: 90.7 FL (ref 79–97)
PLATELET # BLD AUTO: 352 10*3/MM3 (ref 140–450)
PMV BLD AUTO: 8.8 FL (ref 6–12)
POTASSIUM SERPL-SCNC: 4.4 MMOL/L (ref 3.5–5.2)
QT INTERVAL: 398 MS
QTC INTERVAL: 442 MS
RBC # BLD AUTO: 4.31 10*6/MM3 (ref 3.77–5.28)
SODIUM SERPL-SCNC: 140 MMOL/L (ref 136–145)
WBC NRBC COR # BLD AUTO: 9.16 10*3/MM3 (ref 3.4–10.8)

## 2025-08-05 PROCEDURE — 93005 ELECTROCARDIOGRAM TRACING: CPT

## 2025-08-05 PROCEDURE — 80048 BASIC METABOLIC PNL TOTAL CA: CPT

## 2025-08-05 PROCEDURE — 36415 COLL VENOUS BLD VENIPUNCTURE: CPT

## 2025-08-05 PROCEDURE — 85027 COMPLETE CBC AUTOMATED: CPT

## 2025-08-12 ENCOUNTER — HOSPITAL ENCOUNTER (OUTPATIENT)
Facility: HOSPITAL | Age: 55
Setting detail: HOSPITAL OUTPATIENT SURGERY
Discharge: HOME | End: 2025-08-12
Attending: STUDENT IN AN ORGANIZED HEALTH CARE EDUCATION/TRAINING PROGRAM | Admitting: STUDENT IN AN ORGANIZED HEALTH CARE EDUCATION/TRAINING PROGRAM
Payer: COMMERCIAL

## 2025-08-12 ENCOUNTER — ANESTHESIA (OUTPATIENT)
Dept: PERIOP | Facility: HOSPITAL | Age: 55
End: 2025-08-12
Payer: COMMERCIAL

## 2025-08-12 ENCOUNTER — ANESTHESIA EVENT (OUTPATIENT)
Dept: PERIOP | Facility: HOSPITAL | Age: 55
End: 2025-08-12
Payer: COMMERCIAL

## 2025-08-12 VITALS
OXYGEN SATURATION: 95 % | DIASTOLIC BLOOD PRESSURE: 79 MMHG | TEMPERATURE: 97.9 F | HEART RATE: 71 BPM | RESPIRATION RATE: 18 BRPM | SYSTOLIC BLOOD PRESSURE: 124 MMHG

## 2025-08-12 LAB
B-HCG UR QL: NEGATIVE
EXPIRATION DATE: NORMAL
GLUCOSE BLDC GLUCOMTR-MCNC: 102 MG/DL (ref 65–99)
INTERNAL NEGATIVE CONTROL: NEGATIVE
INTERNAL POSITIVE CONTROL: POSITIVE
Lab: NORMAL

## 2025-08-12 PROCEDURE — 25010000002 PROPOFOL 10 MG/ML EMULSION

## 2025-08-12 PROCEDURE — 25010000002 FENTANYL CITRATE (PF) 50 MCG/ML SOLUTION

## 2025-08-12 PROCEDURE — 25010000002 LIDOCAINE 1% - EPINEPHRINE 1:100000 1 %-1:100000 SOLUTION: Performed by: STUDENT IN AN ORGANIZED HEALTH CARE EDUCATION/TRAINING PROGRAM

## 2025-08-12 PROCEDURE — 25010000002 KETOROLAC TROMETHAMINE PER 15 MG

## 2025-08-12 PROCEDURE — 25810000003 LACTATED RINGERS PER 1000 ML: Performed by: STUDENT IN AN ORGANIZED HEALTH CARE EDUCATION/TRAINING PROGRAM

## 2025-08-12 PROCEDURE — 81025 URINE PREGNANCY TEST: CPT | Performed by: STUDENT IN AN ORGANIZED HEALTH CARE EDUCATION/TRAINING PROGRAM

## 2025-08-12 PROCEDURE — 25010000002 PROPOFOL 500 MG/50ML EMULSION

## 2025-08-12 PROCEDURE — 82948 REAGENT STRIP/BLOOD GLUCOSE: CPT

## 2025-08-12 PROCEDURE — 25010000002 LIDOCAINE PF 2% 2 % SOLUTION

## 2025-08-12 PROCEDURE — 25010000002 CEFAZOLIN PER 500 MG: Performed by: STUDENT IN AN ORGANIZED HEALTH CARE EDUCATION/TRAINING PROGRAM

## 2025-08-12 RX ORDER — HYDRALAZINE HYDROCHLORIDE 20 MG/ML
5 INJECTION INTRAMUSCULAR; INTRAVENOUS
Status: DISCONTINUED | OUTPATIENT
Start: 2025-08-12 | End: 2025-08-12 | Stop reason: HOSPADM

## 2025-08-12 RX ORDER — MIDAZOLAM HYDROCHLORIDE 1 MG/ML
1 INJECTION, SOLUTION INTRAMUSCULAR; INTRAVENOUS
Status: DISCONTINUED | OUTPATIENT
Start: 2025-08-12 | End: 2025-08-12 | Stop reason: HOSPADM

## 2025-08-12 RX ORDER — OXYCODONE AND ACETAMINOPHEN 7.5; 325 MG/1; MG/1
1 TABLET ORAL EVERY 4 HOURS PRN
Status: DISCONTINUED | OUTPATIENT
Start: 2025-08-12 | End: 2025-08-12 | Stop reason: HOSPADM

## 2025-08-12 RX ORDER — DROPERIDOL 2.5 MG/ML
0.62 INJECTION, SOLUTION INTRAMUSCULAR; INTRAVENOUS
Status: DISCONTINUED | OUTPATIENT
Start: 2025-08-12 | End: 2025-08-12 | Stop reason: HOSPADM

## 2025-08-12 RX ORDER — HYDROCODONE BITARTRATE AND ACETAMINOPHEN 5; 325 MG/1; MG/1
1 TABLET ORAL ONCE AS NEEDED
Status: DISCONTINUED | OUTPATIENT
Start: 2025-08-12 | End: 2025-08-12 | Stop reason: HOSPADM

## 2025-08-12 RX ORDER — LABETALOL HYDROCHLORIDE 5 MG/ML
5 INJECTION, SOLUTION INTRAVENOUS
Status: DISCONTINUED | OUTPATIENT
Start: 2025-08-12 | End: 2025-08-12 | Stop reason: HOSPADM

## 2025-08-12 RX ORDER — KETOROLAC TROMETHAMINE 30 MG/ML
INJECTION, SOLUTION INTRAMUSCULAR; INTRAVENOUS AS NEEDED
Status: DISCONTINUED | OUTPATIENT
Start: 2025-08-12 | End: 2025-08-12 | Stop reason: SURG

## 2025-08-12 RX ORDER — IPRATROPIUM BROMIDE AND ALBUTEROL SULFATE 2.5; .5 MG/3ML; MG/3ML
3 SOLUTION RESPIRATORY (INHALATION) ONCE AS NEEDED
Status: DISCONTINUED | OUTPATIENT
Start: 2025-08-12 | End: 2025-08-12 | Stop reason: HOSPADM

## 2025-08-12 RX ORDER — HYDROMORPHONE HYDROCHLORIDE 1 MG/ML
0.5 INJECTION, SOLUTION INTRAMUSCULAR; INTRAVENOUS; SUBCUTANEOUS
Status: DISCONTINUED | OUTPATIENT
Start: 2025-08-12 | End: 2025-08-12 | Stop reason: HOSPADM

## 2025-08-12 RX ORDER — FENTANYL CITRATE 50 UG/ML
50 INJECTION, SOLUTION INTRAMUSCULAR; INTRAVENOUS
Status: DISCONTINUED | OUTPATIENT
Start: 2025-08-12 | End: 2025-08-12 | Stop reason: HOSPADM

## 2025-08-12 RX ORDER — ONDANSETRON 2 MG/ML
4 INJECTION INTRAMUSCULAR; INTRAVENOUS ONCE AS NEEDED
Status: DISCONTINUED | OUTPATIENT
Start: 2025-08-12 | End: 2025-08-12 | Stop reason: HOSPADM

## 2025-08-12 RX ORDER — FENTANYL CITRATE 50 UG/ML
INJECTION, SOLUTION INTRAMUSCULAR; INTRAVENOUS AS NEEDED
Status: DISCONTINUED | OUTPATIENT
Start: 2025-08-12 | End: 2025-08-12 | Stop reason: SURG

## 2025-08-12 RX ORDER — NALOXONE HCL 0.4 MG/ML
0.2 VIAL (ML) INJECTION AS NEEDED
Status: DISCONTINUED | OUTPATIENT
Start: 2025-08-12 | End: 2025-08-12 | Stop reason: HOSPADM

## 2025-08-12 RX ORDER — PROPOFOL 10 MG/ML
INJECTION, EMULSION INTRAVENOUS AS NEEDED
Status: DISCONTINUED | OUTPATIENT
Start: 2025-08-12 | End: 2025-08-12 | Stop reason: SURG

## 2025-08-12 RX ORDER — EPHEDRINE SULFATE 50 MG/ML
5 INJECTION, SOLUTION INTRAVENOUS ONCE AS NEEDED
Status: DISCONTINUED | OUTPATIENT
Start: 2025-08-12 | End: 2025-08-12 | Stop reason: HOSPADM

## 2025-08-12 RX ORDER — ATROPINE SULFATE 0.4 MG/ML
0.4 INJECTION, SOLUTION INTRAMUSCULAR; INTRAVENOUS; SUBCUTANEOUS ONCE AS NEEDED
Status: DISCONTINUED | OUTPATIENT
Start: 2025-08-12 | End: 2025-08-12 | Stop reason: HOSPADM

## 2025-08-12 RX ORDER — LIDOCAINE HYDROCHLORIDE 10 MG/ML
0.5 INJECTION, SOLUTION INFILTRATION; PERINEURAL ONCE AS NEEDED
Status: DISCONTINUED | OUTPATIENT
Start: 2025-08-12 | End: 2025-08-12 | Stop reason: HOSPADM

## 2025-08-12 RX ORDER — ACETAMINOPHEN 500 MG
1000 TABLET ORAL ONCE
Status: COMPLETED | OUTPATIENT
Start: 2025-08-12 | End: 2025-08-12

## 2025-08-12 RX ORDER — FLUMAZENIL 0.1 MG/ML
0.2 INJECTION INTRAVENOUS AS NEEDED
Status: DISCONTINUED | OUTPATIENT
Start: 2025-08-12 | End: 2025-08-12 | Stop reason: HOSPADM

## 2025-08-12 RX ORDER — PROMETHAZINE HYDROCHLORIDE 25 MG/1
25 TABLET ORAL ONCE AS NEEDED
Status: DISCONTINUED | OUTPATIENT
Start: 2025-08-12 | End: 2025-08-12 | Stop reason: HOSPADM

## 2025-08-12 RX ORDER — SODIUM CHLORIDE 0.9 % (FLUSH) 0.9 %
3-10 SYRINGE (ML) INJECTION AS NEEDED
Status: DISCONTINUED | OUTPATIENT
Start: 2025-08-12 | End: 2025-08-12 | Stop reason: HOSPADM

## 2025-08-12 RX ORDER — LIDOCAINE HYDROCHLORIDE AND EPINEPHRINE 10; 10 MG/ML; UG/ML
INJECTION, SOLUTION INFILTRATION; PERINEURAL AS NEEDED
Status: DISCONTINUED | OUTPATIENT
Start: 2025-08-12 | End: 2025-08-12 | Stop reason: HOSPADM

## 2025-08-12 RX ORDER — PROMETHAZINE HYDROCHLORIDE 25 MG/1
25 SUPPOSITORY RECTAL ONCE AS NEEDED
Status: DISCONTINUED | OUTPATIENT
Start: 2025-08-12 | End: 2025-08-12 | Stop reason: HOSPADM

## 2025-08-12 RX ORDER — MAGNESIUM HYDROXIDE 1200 MG/15ML
LIQUID ORAL AS NEEDED
Status: DISCONTINUED | OUTPATIENT
Start: 2025-08-12 | End: 2025-08-12 | Stop reason: HOSPADM

## 2025-08-12 RX ORDER — SODIUM CHLORIDE 0.9 % (FLUSH) 0.9 %
3 SYRINGE (ML) INJECTION EVERY 12 HOURS SCHEDULED
Status: DISCONTINUED | OUTPATIENT
Start: 2025-08-12 | End: 2025-08-12 | Stop reason: HOSPADM

## 2025-08-12 RX ORDER — SODIUM CHLORIDE, SODIUM LACTATE, POTASSIUM CHLORIDE, CALCIUM CHLORIDE 600; 310; 30; 20 MG/100ML; MG/100ML; MG/100ML; MG/100ML
9 INJECTION, SOLUTION INTRAVENOUS CONTINUOUS
Status: DISCONTINUED | OUTPATIENT
Start: 2025-08-12 | End: 2025-08-12 | Stop reason: HOSPADM

## 2025-08-12 RX ORDER — LIDOCAINE HYDROCHLORIDE 20 MG/ML
INJECTION, SOLUTION EPIDURAL; INFILTRATION; INTRACAUDAL; PERINEURAL AS NEEDED
Status: DISCONTINUED | OUTPATIENT
Start: 2025-08-12 | End: 2025-08-12 | Stop reason: SURG

## 2025-08-12 RX ORDER — DIPHENHYDRAMINE HYDROCHLORIDE 50 MG/ML
12.5 INJECTION, SOLUTION INTRAMUSCULAR; INTRAVENOUS
Status: DISCONTINUED | OUTPATIENT
Start: 2025-08-12 | End: 2025-08-12 | Stop reason: HOSPADM

## 2025-08-12 RX ADMIN — FENTANYL CITRATE 50 MCG: 50 INJECTION, SOLUTION INTRAMUSCULAR; INTRAVENOUS at 10:08

## 2025-08-12 RX ADMIN — CEFAZOLIN 2000 MG: 2 INJECTION, POWDER, FOR SOLUTION INTRAMUSCULAR; INTRAVENOUS at 09:55

## 2025-08-12 RX ADMIN — ACETAMINOPHEN 1000 MG: 500 TABLET, FILM COATED ORAL at 09:32

## 2025-08-12 RX ADMIN — PROPOFOL 120 MCG/KG/MIN: 10 INJECTION, EMULSION INTRAVENOUS at 10:05

## 2025-08-12 RX ADMIN — PROPOFOL 50 MG: 10 INJECTION, EMULSION INTRAVENOUS at 10:05

## 2025-08-12 RX ADMIN — LIDOCAINE HYDROCHLORIDE 60 MG: 20 INJECTION, SOLUTION EPIDURAL; INFILTRATION; INTRACAUDAL; PERINEURAL at 10:05

## 2025-08-12 RX ADMIN — PROPOFOL 50 MG: 10 INJECTION, EMULSION INTRAVENOUS at 10:09

## 2025-08-12 RX ADMIN — KETOROLAC TROMETHAMINE 30 MG: 30 INJECTION INTRAMUSCULAR; INTRAVENOUS at 10:29

## 2025-08-12 RX ADMIN — SODIUM CHLORIDE, POTASSIUM CHLORIDE, SODIUM LACTATE AND CALCIUM CHLORIDE 9 ML/HR: 600; 310; 30; 20 INJECTION, SOLUTION INTRAVENOUS at 09:26

## (undated) DEVICE — DRESSING,GAUZE,XEROFORM,CURAD,1"X8",ST: Brand: CURAD

## (undated) DEVICE — PATIENT RETURN ELECTRODE, SINGLE-USE, CONTACT QUALITY MONITORING, ADULT, WITH 9FT CORD, FOR PATIENTS WEIGING OVER 33LBS. (15KG): Brand: MEGADYNE

## (undated) DEVICE — SNAR POLYP SENSATION STDOVL 27 240 BX40

## (undated) DEVICE — UNDRGLV SURG BIOGEL PUNCTUREINDICATION SZ7 PF STRL

## (undated) DEVICE — PAD GRND REM POLYHESIVE A/ DISP

## (undated) DEVICE — SUT MNCRYL 3/0 PS2 18IN Y497G

## (undated) DEVICE — ADAPT CLN BIOGUARD AIR/H2O DISP

## (undated) DEVICE — DRAPE,HAND,STERILE: Brand: MEDLINE

## (undated) DEVICE — BITEBLOCK OMNI BLOC

## (undated) DEVICE — THE SINGLE USE ETRAP – POLYP TRAP IS USED FOR SUCTION RETRIEVAL OF ENDOSCOPICALLY REMOVED POLYPS.: Brand: ETRAP

## (undated) DEVICE — CANN O2 ETCO2 FITS ALL CONN CO2 SMPL A/ 7IN DISP LF

## (undated) DEVICE — DISPOSABLE BIPOLAR FORCEPS 4" (10.2CM) JEWELERS, STRAIGHT 0.4MM TIP AND 12 FT. (3.6M) CABLE: Brand: KIRWAN

## (undated) DEVICE — LN SMPL CO2 SHTRM SD STREAM W/M LUER

## (undated) DEVICE — SUT MNCRYL 4/0 PS2 18 IN

## (undated) DEVICE — GLV SURG BIOGEL LTX PF 7

## (undated) DEVICE — PK ORTHO MINOR TOWER 40

## (undated) DEVICE — KT ORCA ORCAPOD DISP STRL

## (undated) DEVICE — GAUZE,SPONGE,4"X4",12PLY,STRL,LF,10/TRAY: Brand: MEDLINE

## (undated) DEVICE — LASSO POLYPECTOMY SNARE: Brand: LASSO

## (undated) DEVICE — DRAPE,TOWEL,LARGE,INVISISHIELD: Brand: MEDLINE

## (undated) DEVICE — SENSR O2 OXIMAX FNGR A/ 18IN NONSTR

## (undated) DEVICE — SINGLE-USE BIOPSY FORCEPS: Brand: RADIAL JAW 4

## (undated) DEVICE — TUBING, SUCTION, 1/4" X 10', STRAIGHT: Brand: MEDLINE

## (undated) DEVICE — THE CARR-LOCKE INJECTION NEEDLE IS A SINGLE USE, DISPOSABLE, FLEXIBLE SHEATH INJECTION NEEDLE USED FOR THE INJECTION OF VARIOUS TYPES OF MEDIA THROUGH FLEXIBLE ENDOSCOPES.

## (undated) DEVICE — SOL ISO/ALC RUB 70PCT 4OZ

## (undated) DEVICE — DRAPE,U/ SHT,SPLIT,PLAS,STERIL: Brand: MEDLINE

## (undated) DEVICE — APPL CHLORAPREP HI/LITE 26ML ORNG

## (undated) DEVICE — Device: Brand: BLACK EYE